# Patient Record
Sex: MALE | Race: WHITE | NOT HISPANIC OR LATINO | Employment: FULL TIME | URBAN - METROPOLITAN AREA
[De-identification: names, ages, dates, MRNs, and addresses within clinical notes are randomized per-mention and may not be internally consistent; named-entity substitution may affect disease eponyms.]

---

## 2017-01-12 ENCOUNTER — LAB VISIT (OUTPATIENT)
Dept: LAB | Facility: HOSPITAL | Age: 62
End: 2017-01-12
Attending: NURSE PRACTITIONER
Payer: COMMERCIAL

## 2017-01-12 DIAGNOSIS — I10 ESSENTIAL HYPERTENSION: ICD-10-CM

## 2017-01-12 DIAGNOSIS — E11.9 TYPE 2 DIABETES MELLITUS WITHOUT COMPLICATION: ICD-10-CM

## 2017-01-12 DIAGNOSIS — Z13.29 THYROID DISORDER SCREEN: ICD-10-CM

## 2017-01-12 DIAGNOSIS — Z13.0 SCREENING, ANEMIA, DEFICIENCY, IRON: ICD-10-CM

## 2017-01-12 DIAGNOSIS — E78.9 LIPID DISORDER: ICD-10-CM

## 2017-01-12 DIAGNOSIS — Z12.5 PROSTATE CANCER SCREENING: ICD-10-CM

## 2017-01-12 LAB
ALBUMIN SERPL BCP-MCNC: 4.6 G/DL
ALP SERPL-CCNC: 64 IU/L
ALT SERPL W/O P-5'-P-CCNC: 42 IU/L
ANION GAP SERPL CALC-SCNC: 13 MMOL/L
AST SERPL-CCNC: 31 IU/L
BASOPHILS # BLD AUTO: 0.02 K/UL
BASOPHILS NFR BLD: 0.3 %
BILIRUB SERPL-MCNC: 0.8 MG/DL
BUN SERPL-MCNC: 12 MG/DL
CALCIUM SERPL-MCNC: 9.8 MG/DL
CHLORIDE SERPL-SCNC: 100 MMOL/L
CHOLEST/HDLC SERPL: 4.6 {RATIO}
CO2 SERPL-SCNC: 30 MMOL/L
COMPLEXED PSA SERPL-MCNC: 0.52 NG/ML
CREAT SERPL-MCNC: 0.71 MG/DL
DIFFERENTIAL METHOD: NORMAL
EOSINOPHIL # BLD AUTO: 0.2 K/UL
EOSINOPHIL NFR BLD: 3.3 %
ERYTHROCYTE [DISTWIDTH] IN BLOOD BY AUTOMATED COUNT: 12.3 %
EST. GFR  (AFRICAN AMERICAN): >60 ML/MIN/1.73 M^2
EST. GFR  (NON AFRICAN AMERICAN): >60 ML/MIN/1.73 M^2
GLUCOSE SERPL-MCNC: 146 MG/DL
HCT VFR BLD AUTO: 42.4 %
HDL/CHOLESTEROL RATIO: 21.9 %
HDLC SERPL-MCNC: 210 MG/DL
HDLC SERPL-MCNC: 46 MG/DL
HGB BLD-MCNC: 14.2 G/DL
LDLC SERPL CALC-MCNC: 97.2 MG/DL
LYMPHOCYTES # BLD AUTO: 1.9 K/UL
LYMPHOCYTES NFR BLD: 29.5 %
MCH RBC QN AUTO: 30.7 PG
MCHC RBC AUTO-ENTMCNC: 33.5 %
MCV RBC AUTO: 92 FL
MONOCYTES # BLD AUTO: 0.6 K/UL
MONOCYTES NFR BLD: 9.3 %
NEUTROPHILS # BLD AUTO: 3.7 K/UL
NEUTROPHILS NFR BLD: 57.4 %
NONHDLC SERPL-MCNC: 164 MG/DL
PLATELET # BLD AUTO: 321 K/UL
PMV BLD AUTO: 11.3 FL
POTASSIUM SERPL-SCNC: 4.6 MMOL/L
PROT SERPL-MCNC: 7.5 G/DL
RBC # BLD AUTO: 4.63 M/UL
SODIUM SERPL-SCNC: 143 MMOL/L
TRIGL SERPL-MCNC: 334 MG/DL
TSH SERPL DL<=0.005 MIU/L-ACNC: 3.21 UIU/ML
WBC # BLD AUTO: 6.43 K/UL

## 2017-01-12 PROCEDURE — 84443 ASSAY THYROID STIM HORMONE: CPT

## 2017-01-12 PROCEDURE — 36415 COLL VENOUS BLD VENIPUNCTURE: CPT | Mod: PO

## 2017-01-12 PROCEDURE — 83036 HEMOGLOBIN GLYCOSYLATED A1C: CPT | Mod: PO

## 2017-01-12 PROCEDURE — 85025 COMPLETE CBC W/AUTO DIFF WBC: CPT | Mod: PO

## 2017-01-12 PROCEDURE — 80053 COMPREHEN METABOLIC PANEL: CPT | Mod: PO

## 2017-01-12 PROCEDURE — 84153 ASSAY OF PSA TOTAL: CPT

## 2017-01-12 PROCEDURE — 80061 LIPID PANEL: CPT

## 2017-01-13 ENCOUNTER — OFFICE VISIT (OUTPATIENT)
Dept: FAMILY MEDICINE | Facility: CLINIC | Age: 62
End: 2017-01-13
Payer: COMMERCIAL

## 2017-01-13 ENCOUNTER — TELEPHONE (OUTPATIENT)
Dept: FAMILY MEDICINE | Facility: CLINIC | Age: 62
End: 2017-01-13

## 2017-01-13 VITALS
BODY MASS INDEX: 30.51 KG/M2 | SYSTOLIC BLOOD PRESSURE: 120 MMHG | WEIGHT: 189.81 LBS | HEIGHT: 66 IN | DIASTOLIC BLOOD PRESSURE: 80 MMHG | OXYGEN SATURATION: 98 % | HEART RATE: 87 BPM | TEMPERATURE: 98 F

## 2017-01-13 DIAGNOSIS — E11.9 TYPE 2 DIABETES MELLITUS WITHOUT COMPLICATION, WITHOUT LONG-TERM CURRENT USE OF INSULIN: ICD-10-CM

## 2017-01-13 DIAGNOSIS — F41.9 ANXIETY: ICD-10-CM

## 2017-01-13 DIAGNOSIS — E11.69 HYPERLIPIDEMIA ASSOCIATED WITH TYPE 2 DIABETES MELLITUS: ICD-10-CM

## 2017-01-13 DIAGNOSIS — I10 ESSENTIAL HYPERTENSION: ICD-10-CM

## 2017-01-13 DIAGNOSIS — M25.511 RIGHT SHOULDER PAIN, UNSPECIFIED CHRONICITY: ICD-10-CM

## 2017-01-13 DIAGNOSIS — E78.5 HYPERLIPIDEMIA ASSOCIATED WITH TYPE 2 DIABETES MELLITUS: ICD-10-CM

## 2017-01-13 DIAGNOSIS — Z00.00 ANNUAL PHYSICAL EXAM: Primary | ICD-10-CM

## 2017-01-13 LAB
ESTIMATED AVG GLUCOSE: 137 MG/DL
HBA1C MFR BLD HPLC: 6.4 %

## 2017-01-13 PROCEDURE — 99396 PREV VISIT EST AGE 40-64: CPT | Mod: S$GLB,,, | Performed by: NURSE PRACTITIONER

## 2017-01-13 PROCEDURE — 3074F SYST BP LT 130 MM HG: CPT | Mod: S$GLB,,, | Performed by: NURSE PRACTITIONER

## 2017-01-13 PROCEDURE — 3079F DIAST BP 80-89 MM HG: CPT | Mod: S$GLB,,, | Performed by: NURSE PRACTITIONER

## 2017-01-13 PROCEDURE — 99999 PR PBB SHADOW E&M-EST. PATIENT-LVL III: CPT | Mod: PBBFAC,,, | Performed by: NURSE PRACTITIONER

## 2017-01-13 RX ORDER — FENOFIBRATE 160 MG/1
160 TABLET ORAL DAILY
Qty: 90 TABLET | Refills: 1 | Status: SHIPPED | OUTPATIENT
Start: 2017-01-13 | End: 2017-01-13 | Stop reason: SDUPTHER

## 2017-01-13 RX ORDER — LISINOPRIL 40 MG/1
40 TABLET ORAL DAILY
Qty: 90 TABLET | Refills: 1 | Status: SHIPPED | OUTPATIENT
Start: 2017-01-13 | End: 2017-09-06 | Stop reason: SDUPTHER

## 2017-01-13 RX ORDER — TRAMADOL HYDROCHLORIDE 50 MG/1
50 TABLET ORAL 2 TIMES DAILY PRN
Qty: 45 TABLET | Refills: 0 | Status: SHIPPED | OUTPATIENT
Start: 2017-01-13 | End: 2017-06-16 | Stop reason: ALTCHOICE

## 2017-01-13 RX ORDER — MELOXICAM 7.5 MG/1
7.5 TABLET ORAL DAILY
Qty: 30 TABLET | Refills: 5 | Status: SHIPPED | OUTPATIENT
Start: 2017-01-13 | End: 2017-06-16 | Stop reason: SDUPTHER

## 2017-01-13 RX ORDER — ROSUVASTATIN CALCIUM 20 MG/1
20 TABLET, COATED ORAL DAILY
Qty: 90 TABLET | Refills: 1 | Status: SHIPPED | OUTPATIENT
Start: 2017-01-13 | End: 2017-01-13 | Stop reason: SDUPTHER

## 2017-01-13 RX ORDER — ROSUVASTATIN CALCIUM 20 MG/1
20 TABLET, COATED ORAL DAILY
Qty: 90 TABLET | Refills: 1 | Status: SHIPPED | OUTPATIENT
Start: 2017-01-13 | End: 2017-06-16 | Stop reason: SDUPTHER

## 2017-01-13 RX ORDER — CLONAZEPAM 1 MG/1
1 TABLET ORAL NIGHTLY PRN
Qty: 30 TABLET | Refills: 0 | Status: SHIPPED | OUTPATIENT
Start: 2017-01-13 | End: 2017-06-16 | Stop reason: SDUPTHER

## 2017-01-13 RX ORDER — FENOFIBRATE 160 MG/1
160 TABLET ORAL DAILY
Qty: 90 TABLET | Refills: 1 | Status: SHIPPED | OUTPATIENT
Start: 2017-01-13 | End: 2017-06-16 | Stop reason: SDUPTHER

## 2017-01-13 RX ORDER — METFORMIN HYDROCHLORIDE 1000 MG/1
1000 TABLET ORAL 2 TIMES DAILY WITH MEALS
Qty: 180 TABLET | Refills: 1 | Status: SHIPPED | OUTPATIENT
Start: 2017-01-13 | End: 2017-12-27 | Stop reason: SDUPTHER

## 2017-01-13 RX ORDER — ASPIRIN 81 MG/1
81 TABLET ORAL DAILY
Qty: 90 TABLET | Refills: 3 | Status: SHIPPED | OUTPATIENT
Start: 2017-01-13 | End: 2030-01-12

## 2017-01-13 NOTE — PROGRESS NOTES
"Subjective:       Patient ID: Bartolome Esquivel Jr. is a 62 y.o. male.    Chief Complaint: Annual Exam (wellness)    HPI Comments: Patient is a 62 year old white male here today for annual physical exam with wellness lab results.    Patient has Type 2 DM that is controlled on present medication.   with HgbA1C of 6.4.  Kidney function is stable.  Eye exam is up to date.  Declined Pneumonia vaccine.      Patient has Hypertension that is controlled on present medication.  /80 (BP Location: Right arm, Patient Position: Sitting, BP Method: Manual)  Pulse 87  Temp 97.8 °F (36.6 °C) (Oral)   Ht 5' 6" (1.676 m)  Wt 86.1 kg (189 lb 13.1 oz)  SpO2 98%  BMI 30.64 kg/m2    Patient has Hyperlipidemia.  Total cholesterol is mildly elevated at 210 and triglycerides are high 333.  LDL remains < 100.  Patient reports he did not run out of medication BUT they did change him from Brand Crestor to GENERIC since it is now available - the increase could be secondary to generic change - will monitor and see if it continues to elevate because it was just changed in the past month.    Patient has Anxiety - takes the Clonazepam only as needed.    Patient complains of a Chronic Right Shoulder pain - his right shoulder hurts on and off for years.  He reports occurences since before Dr. Michaels retired - 7 or 8 years it hurts but usually occurs seasonally and had attributed to fishing or other use.  Reports he was given steroids in the past and helped a little.  Reports was given Vicodin in the past and did not help and had stronger medication but did not like the way it made him fill.  He reports it is still intermittently once or twice a week.  States he would take Ibuprofen or Aleve but when that wasn't helping any more.  States his wife had Tramadol and he took one of hers and reports complete relief of pain.  Patient reports he has never seen an orthopedic MD to have shoulder evaluated.            Component      Latest Ref Rng & " Units 1/12/2017 6/22/2016   WBC      3.90 - 12.70 K/uL 6.43    RBC      4.60 - 6.20 M/uL 4.63    Hemoglobin      14.0 - 18.0 g/dL 14.2    Hematocrit      40.0 - 54.0 % 42.4    MCV      82 - 98 fL 92    MCH      27.0 - 31.0 pg 30.7    MCHC      32.0 - 36.0 % 33.5    RDW      11.5 - 14.5 % 12.3    Platelets      150 - 350 K/uL 321    MPV      9.2 - 12.9 fL 11.3    Gran #      1.8 - 7.7 K/uL 3.7    Lymph #      1.0 - 4.8 K/uL 1.9    Mono #      0.3 - 1.0 K/uL 0.6    Eos #      0.0 - 0.5 K/uL 0.2    Baso #      0.00 - 0.20 K/uL 0.02    Gran%      38.0 - 73.0 % 57.4    Lymph%      18.0 - 48.0 % 29.5    Mono%      4.0 - 15.0 % 9.3    Eosinophil%      0.0 - 8.0 % 3.3    Basophil%      0.0 - 1.9 % 0.3    Differential Method       Automated    Sodium      136 - 145 mmol/L 143 134 (L)   Potassium      3.5 - 5.1 mmol/L 4.6 4.5   Chloride      95 - 110 mmol/L 100 102   CO2      23 - 29 mmol/L 30 (H) 21 (L)   Glucose      70 - 110 mg/dL 146 (H) 144 (H)   BUN, Bld      2 - 20 mg/dL 12 13   Creatinine      0.50 - 1.40 mg/dL 0.71 0.9   Calcium      8.7 - 10.5 mg/dL 9.8 9.7   Total Protein      6.0 - 8.4 g/dL 7.5 7.4   Albumin      3.5 - 5.2 g/dL 4.6 4.5   Total Bilirubin      0.1 - 1.0 mg/dL 0.8 0.6   Alkaline Phosphatase      38 - 126 IU/L 64 54 (L)   AST      15 - 46 IU/L 31 22   ALT      10 - 44 IU/L 42 31   Anion Gap      8 - 16 mmol/L 13 11   eGFR if African American      >60 mL/min/1.73 m:2 >60.0 >60.0   eGFR if non African American      >60 mL/min/1.73 m:2 >60.0 >60.0   Cholesterol      120 - 199 mg/dL 210 (H) 162   Triglycerides      30 - 150 mg/dL 334 (H) 244 (H)   HDL      40 - 75 mg/dL 46 45   LDL Cholesterol      63.0 - 159.0 mg/dL 97.2 68.2   HDL/Chol Ratio      20.0 - 50.0 % 21.9 27.8   Total Cholesterol/HDL Ratio      2.0 - 5.0 4.6 3.6   Non-HDL Cholesterol      mg/dL 164 117   Hemoglobin A1C      4.5 - 6.2 % 6.4 (H) 6.5 (H)   Estimated Avg Glucose      68 - 131 mg/dL 137 (H) 140 (H)   TSH      0.400 - 4.000 uIU/mL  3.206    PSA, SCREEN      0.00 - 4.00 ng/mL 0.52        Previous Medications    CLONAZEPAM (KLONOPIN) 1 MG TABLET    Take 1 tablet (1 mg total) by mouth nightly as needed.    FENOFIBRATE 160 MG TAB    Take 1 tablet (160 mg total) by mouth once daily.    LISINOPRIL (PRINIVIL,ZESTRIL) 40 MG TABLET    Take 1 tablet (40 mg total) by mouth once daily.    METFORMIN (GLUCOPHAGE) 1000 MG TABLET    Take 1 tablet (1,000 mg total) by mouth 2 (two) times daily with meals.    ROSUVASTATIN (CRESTOR) 20 MG TABLET    Take 1 tablet (20 mg total) by mouth once daily.       Past Medical History   Diagnosis Date    Anxiety     DM (diabetes mellitus) 11/4/2014    Hyperlipidemia     Hypertension     Impaired fasting glucose     Metabolic syndrome        Past Surgical History   Procedure Laterality Date    Colonoscopy  2007     + polyp - tubular adenoma - Dr. Hernandez       Family History   Problem Relation Age of Onset    Cancer Mother      Colon    Heart disease Father 49     MI    Diabetes Father     No Known Problems Daughter     No Known Problems Son     No Known Problems Son        Social History     Social History    Marital status:      Spouse name: N/A    Number of children: N/A    Years of education: N/A     Occupational History     [employer]     Social History Main Topics    Smoking status: Former Smoker     Packs/day: 1.00     Years: 15.00     Types: Cigarettes    Smokeless tobacco: None      Comment: Quit in 1994    Alcohol use Yes      Comment: Approx 20 beers/week    Drug use: No    Sexual activity: Not Asked     Other Topics Concern    None     Social History Narrative       Review of Systems   Constitutional: Negative for activity change, appetite change, fatigue, fever and unexpected weight change.   HENT: Negative for congestion, ear pain, mouth sores, nosebleeds, postnasal drip, rhinorrhea, sinus pressure, sneezing, sore throat, trouble swallowing and voice change.    Eyes: Negative.   "  Respiratory: Negative for cough, chest tightness and shortness of breath.    Cardiovascular: Negative for chest pain, palpitations and leg swelling.   Gastrointestinal: Negative.  Negative for abdominal pain, blood in stool, constipation, diarrhea, nausea and vomiting.   Endocrine: Negative.    Genitourinary: Negative for difficulty urinating, dysuria, flank pain, hematuria and urgency.   Musculoskeletal: Positive for arthralgias. Negative for back pain, gait problem, joint swelling, myalgias and neck pain.        Chronic right shoulder pain   Skin: Negative for color change, rash and wound.   Allergic/Immunologic: Negative for immunocompromised state.   Neurological: Negative for dizziness, tremors, seizures, syncope, speech difficulty and headaches.   Hematological: Negative for adenopathy. Does not bruise/bleed easily.   Psychiatric/Behavioral: Negative for behavioral problems, dysphoric mood, sleep disturbance and suicidal ideas. The patient is not nervous/anxious.          Objective:     Vitals:    01/13/17 0816   BP: 120/80   BP Location: Right arm   Patient Position: Sitting   BP Method: Manual   Pulse: 87   Temp: 97.8 °F (36.6 °C)   TempSrc: Oral   SpO2: 98%   Weight: 86.1 kg (189 lb 13.1 oz)   Height: 5' 6" (1.676 m)          Physical Exam   Constitutional: He is oriented to person, place, and time. He appears well-developed and well-nourished. No distress.   + obesity. Body mass index is 31.31 kg/(m^2).     HENT:   Head: Normocephalic and atraumatic.   Right Ear: External ear normal.   Left Ear: External ear normal.   Nose: Nose normal.   Mouth/Throat: Oropharynx is clear and moist. No oropharyngeal exudate.   Eyes: Conjunctivae and EOM are normal. Pupils are equal, round, and reactive to light. Right eye exhibits no discharge. Left eye exhibits no discharge. No scleral icterus.   Neck: Normal range of motion. Neck supple. No JVD present. No tracheal deviation present. No thyromegaly present. "   Cardiovascular: Normal rate.    No murmur heard.  Pulses:       Dorsalis pedis pulses are 2+ on the right side, and 2+ on the left side.        Posterior tibial pulses are 2+ on the right side, and 2+ on the left side.   + irregular rate -  premature contractions - monitored by cardiology Dr. Baker   Pulmonary/Chest: Effort normal and breath sounds normal. No stridor. No respiratory distress. He has no wheezes. He has no rales.   Abdominal: Soft. Bowel sounds are normal. He exhibits no distension. There is no tenderness. There is no rebound and no guarding. A hernia is present.       Patient with ? Ventral hernia versus diastasis recti; nontender, only present with abdominal tension   Musculoskeletal: Normal range of motion. He exhibits no edema.        Right foot: There is normal range of motion and no deformity.        Left foot: There is normal range of motion and no deformity.   Feet:   Right Foot:   Protective Sensation: 8 sites tested. 8 sites sensed.   Skin Integrity: Negative for ulcer or skin breakdown.   Left Foot:   Protective Sensation: 8 sites tested. 8 sites sensed.   Skin Integrity: Negative for ulcer or skin breakdown.   Lymphadenopathy:     He has no cervical adenopathy.   Neurological: He is alert and oriented to person, place, and time. Coordination normal.   Skin: Skin is warm and dry. No rash noted. He is not diaphoretic.   Psychiatric: He has a normal mood and affect. His behavior is normal.         Assessment:         ICD-10-CM ICD-9-CM   1. Annual physical exam Z00.00 V70.0   2. Type 2 diabetes mellitus without complication, without long-term current use of insulin E11.9 250.00   3. Essential hypertension I10 401.9   4. Hyperlipidemia associated with type 2 diabetes mellitus E11.69 250.80    E78.5 272.4   5. Anxiety F41.9 300.00   6. Right shoulder pain, unspecified chronicity M25.511 719.41       Plan:         Annual physical exam  -  Advised on Shingles and Pneumovax - declined at this  time.  Health Maintenance Summary        Zoster Vaccine Overdue 1/8/2017   Refused       Pneumococcal PPSV23 (Medium Risk) Postponed 6/14/2017 Originally 1/8/1975. Patient Refused       Colonoscopy Next Due 6/11/2017         Done 6/11/2007 Dr. Hernandez - tubular adenoma       Foot Exam Next Due 6/23/2017         Done 6/23/2016 [facility]: WORKFLOW - DIABETES - DIABETIC FOOT EXAM PERFORMED        Done 6/23/2016         Done 11/6/2015         Done 11/6/2015 [facility]: WORKFLOW - DIABETES - DIABETIC FOOT EXAM PERFORMED       Hemoglobin A1c Next Due 7/12/2017         Done 1/12/2017 HEMOGLOBIN A1C (A)        Done 6/22/2016 HEMOGLOBIN A1C (A)        Done 11/4/2015 HEMOGLOBIN A1C (A)        Done 7/20/2015 HEMOGLOBIN A1C (A)        Done 2/24/2015 HEMOGLOBIN A1C (A)       Eye Exam Next Due 12/20/2017         Done 12/20/2016 Dr. Humphries - no diabetic retinopathy       Lipid Panel Next Due 1/12/2018         Done 1/12/2017 LIPID PANEL (A)        Done 6/22/2016 LIPID PANEL (A)        Done 11/4/2015 LIPID PANEL (A)        Done 7/20/2015 LIPID PANEL (A)        Done 2/24/2015 LIPID PANEL (A)       TETANUS VACCINE Next Due 6/23/2026         Declined 6/23/2016        Hepatitis C Screening Completed         Done 6/22/2016 HEPATITIS C ANTIBODY       Influenza Vaccine Addressed         Declined 1/13/2017         Declined 11/6/2015         Type 2 diabetes mellitus without complication, without long-term current use of insulin  -  Controlled on present medication.  Repeat fasting labs and office visit in 6 months.  -  Start ASA 81 mg daily.  -     metformin (GLUCOPHAGE) 1000 MG tablet; Take 1 tablet (1,000 mg total) by mouth 2 (two) times daily with meals.  Dispense: 180 tablet; Refill: 1  -     aspirin (ECOTRIN) 81 MG EC tablet; Take 1 tablet (81 mg total) by mouth once daily.  Dispense: 90 tablet; Refill: 3    Essential hypertension  -  Continue Lisinopril at present dose.  -     lisinopril (PRINIVIL,ZESTRIL) 40 MG tablet; Take 1 tablet  (40 mg total) by mouth once daily.  Dispense: 90 tablet; Refill: 1    Hyperlipidemia associated with type 2 diabetes mellitus  -  Continue present medications below.  Stricter lifestyle modifications to lower triglycerides.  Repeat labs in 6 months.  --     rosuvastatin (CRESTOR) 20 MG tablet; Take 1 tablet (20 mg total) by mouth once daily.  Dispense: 90 tablet; Refill: 1  -     fenofibrate 160 MG Tab; Take 1 tablet (160 mg total) by mouth once daily.  Dispense: 90 tablet; Refill: 1    Anxiety  - Take Clonazepam only as needed.  -     clonazePAM (KLONOPIN) 1 MG tablet; Take 1 tablet (1 mg total) by mouth nightly as needed.  Dispense: 30 tablet; Refill: 0    Right shoulder pain, unspecified chronicity  -  Start Meloxicam 7.5 mg daily - going on intermittently with use for past 8 years - likely DJD/arthritis BUT needs to go have evaluated by Orthopedic MD.  Advised I can not treat chronic pain - I will prescribe Tramadol for 1 prescription only to take for pain that is not relieved by Meloxicam and must see specialist for further management.  Patient verbalizes understanding.  -     meloxicam (MOBIC) 7.5 MG tablet; Take 1 tablet (7.5 mg total) by mouth once daily.  Dispense: 30 tablet; Refill: 5  -     tramadol (ULTRAM) 50 mg tablet; Take 1 tablet (50 mg total) by mouth 2 (two) times daily as needed for Pain.  Dispense: 45 tablet; Refill: 0      Return in about 6 months (around 7/13/2017).     Patient's Medications   New Prescriptions    ASPIRIN (ECOTRIN) 81 MG EC TABLET    Take 1 tablet (81 mg total) by mouth once daily.    MELOXICAM (MOBIC) 7.5 MG TABLET    Take 1 tablet (7.5 mg total) by mouth once daily.    TRAMADOL (ULTRAM) 50 MG TABLET    Take 1 tablet (50 mg total) by mouth 2 (two) times daily as needed for Pain.   Previous Medications    No medications on file   Modified Medications    Modified Medication Previous Medication    CLONAZEPAM (KLONOPIN) 1 MG TABLET clonazePAM (KLONOPIN) 1 MG tablet       Take 1  tablet (1 mg total) by mouth nightly as needed.    Take 1 tablet (1 mg total) by mouth nightly as needed.    FENOFIBRATE 160 MG TAB fenofibrate 160 MG Tab       Take 1 tablet (160 mg total) by mouth once daily.    Take 1 tablet (160 mg total) by mouth once daily.    LISINOPRIL (PRINIVIL,ZESTRIL) 40 MG TABLET lisinopril (PRINIVIL,ZESTRIL) 40 MG tablet       Take 1 tablet (40 mg total) by mouth once daily.    Take 1 tablet (40 mg total) by mouth once daily.    METFORMIN (GLUCOPHAGE) 1000 MG TABLET metformin (GLUCOPHAGE) 1000 MG tablet       Take 1 tablet (1,000 mg total) by mouth 2 (two) times daily with meals.    Take 1 tablet (1,000 mg total) by mouth 2 (two) times daily with meals.    ROSUVASTATIN (CRESTOR) 20 MG TABLET rosuvastatin (CRESTOR) 20 MG tablet       Take 1 tablet (20 mg total) by mouth once daily.    Take 1 tablet (20 mg total) by mouth once daily.   Discontinued Medications    No medications on file

## 2017-01-13 NOTE — TELEPHONE ENCOUNTER
----- Message from Annetta Eaton sent at 1/13/2017 10:15 AM CST -----  Contact: [phone]  Patient is returning your call

## 2017-01-13 NOTE — TELEPHONE ENCOUNTER
Called pt left detail message.that DM are now advise to take a low dose of Asprin daily, so NP Susie sent in prescription to [facility].

## 2017-01-13 NOTE — MR AVS SNAPSHOT
[address] [address]  Phone: [phone]  Fax: [phone]                  Bartolome Esquivel Jr.   2017 8:20 AM   Office Visit    Description:  Male : 1955   Provider:  Susie Carranza NP   Department:  [facility]           Reason for Visit     Annual Exam           Diagnoses this Visit        Comments    Type 2 diabetes mellitus without complication, without long-term current use of insulin    -  Primary     Essential hypertension         Hyperlipidemia associated with type 2 diabetes mellitus         Anxiety         Right shoulder pain, unspecified chronicity         Lipid disorder                To Do List           Goals (5 Years of Data)     None      Follow-Up and Disposition     Return in about 6 months (around 2017).       These Medications        Disp Refills Start End    meloxicam (MOBIC) 7.5 MG tablet 30 tablet 5 2017     Take 1 tablet (7.5 mg total) by mouth once daily. - Oral    Ph[facility]: [facility] [address] Ph #: [phone]       tramadol (ULTRAM) 50 mg tablet 45 tablet 0 2017     Take 1 tablet (50 mg total) by mouth 2 (two) times daily as needed for Pain. - Oral    [facility] [address] Ph #: [phone]       clonazePAM (KLONOPIN) 1 MG tablet 30 tablet 0 2017     Take 1 tablet (1 mg total) by mouth nightly as needed. - Oral    [facility] [place] [facility] [address] [facility], [address] Ph #: [phone]       lisinopril (PRINIVIL,ZESTRIL) 40 MG tablet 90 tablet 1 2017     Take 1 tablet (40 mg total) by mouth once daily. - Oral    [facility] [place] [facility] [address] [facility], [address] Ph #: [phone]       metformin (GLUCOPHAGE) 1000 MG tablet 180 tablet 1 2017     Take 1 tablet (1,000 mg total) by mouth 2 (two) times daily with meals. - Oral    [id number] [place] [place] [facility] [address] [facility] [address] Ph #: [phone]       rosuvastatin (CRESTOR) 20 MG tablet 90 tablet 1 1/13/2017     Take 1 tablet (20 mg total) by mouth once daily. - Oral    Pharmacy: [facility] - [address] Ph #: [phone]       fenofibrate 160 MG Tab 90 tablet 1 1/13/2017 1/13/2018    Take 1 tablet (160 mg total) by mouth once daily. - Oral    Pharmacy: [facility] - [address] Ph #: [phone]         [facility]     [facility] On [facility] Nurse Care Line - 24/7 Assistance  Registered nurses in the [facility] On Call Center provide clinical advisement, health education, appointment booking, and other advisory services.  Call for this free service at [phone].             Medications           Message regarding Medications     Verify the changes and/or additions to your medication regime listed below are the same as discussed with your clinician today.  If any of these changes or additions are incorrect, please notify your healthcare provider.        START taking these NEW medications        Refills    meloxicam (MOBIC) 7.5 MG tablet 5    Sig: Take 1 tablet (7.5 mg total) by mouth once daily.    Class: Normal    Route: Oral    tramadol (ULTRAM) 50 mg tablet 0    Sig: Take 1 tablet (50 mg total) by mouth 2 (two) times daily as needed for Pain.    Class: Print    Route: Oral           Verify that the below list of medications is an accurate representation of the medications you are currently taking.  If none reported, the list may be blank. If incorrect, please contact your healthcare provider. Carry this list with you in case of emergency.           Current Medications     clonazePAM (KLONOPIN) 1 MG tablet Take 1 tablet (1 mg total) by mouth nightly as needed.    fenofibrate 160 MG Tab Take 1 tablet (160 mg total) by mouth once daily.    lisinopril (PRINIVIL,ZESTRIL) 40 MG tablet Take 1  "tablet (40 mg total) by mouth once daily.    metformin (GLUCOPHAGE) 1000 MG tablet Take 1 tablet (1,000 mg total) by mouth 2 (two) times daily with meals.    rosuvastatin (CRESTOR) 20 MG tablet Take 1 tablet (20 mg total) by mouth once daily.    meloxicam (MOBIC) 7.5 MG tablet Take 1 tablet (7.5 mg total) by mouth once daily.    tramadol (ULTRAM) 50 mg tablet Take 1 tablet (50 mg total) by mouth 2 (two) times daily as needed for Pain.           Clinical Reference Information           Vital Signs - Last Recorded  Most recent update: 1/13/2017  8:20 AM by Terrie Miranda MA    BP Pulse Temp Ht Wt SpO2    120/80 (BP Location: Right arm, Patient Position: Sitting, BP Method: Manual) 87 97.8 °F (36.6 °C) (Oral) 5' 6" (1.676 m) 86.1 kg (189 lb 13.1 oz) 98%    BMI                30.64 kg/m2          Blood Pressure          Most Recent Value    BP  120/80      Allergies as of 1/13/2017     Lipitor [Atorvastatin]    Pcn [Penicillins]      Immunizations Administered on Date of Encounter - 1/13/2017     None      [facility] Sign-Up     Activating your [facility] account is as easy as 1-2-3!     1) Visit [email or web address], select Sign Up Now, enter this activation code and your date of birth, then select Next.  [email or web address]  Expires: 2/27/2017  8:49 AM      2) Create a username and password to use when you visit [facility] in the future and select a security question in case you lose your password and select Next.    3) Enter your e-mail address and click Sign Up!    Additional Information  If you have questions, please e-mail [email or web address] or call [phone] to talk to our [facility] staff. Remember, [facility] is NOT to be used for urgent needs. For medical emergencies, dial 911.         "

## 2017-03-17 ENCOUNTER — TELEPHONE (OUTPATIENT)
Dept: UROLOGY | Facility: CLINIC | Age: 62
End: 2017-03-17

## 2017-03-17 NOTE — TELEPHONE ENCOUNTER
----- Message from Roque Seth sent at 3/17/2017 11:03 AM CDT -----  Contact: [phone]/self  Refill request for tramadol (ULTRAM) 50 mg tablet.  Please advise

## 2017-03-17 NOTE — TELEPHONE ENCOUNTER
When I seen patient in Jan. 2017, this is what I told him:  - Start Meloxicam 7.5 mg daily - going on intermittently with use for past 8 years - likely DJD/arthritis BUT needs to go have evaluated by Orthopedic MD. Advised I can not treat chronic pain - I will prescribe Tramadol for 1 prescription only to take for pain that is not relieved by Meloxicam and must see specialist for further management. Patient verbalizes understanding.    Nurse practitioners can not treat chronic pain - see orthopedic MD for chronic shoulder pain

## 2017-06-12 ENCOUNTER — TELEPHONE (OUTPATIENT)
Dept: FAMILY MEDICINE | Facility: CLINIC | Age: 62
End: 2017-06-12

## 2017-06-12 NOTE — TELEPHONE ENCOUNTER
Advise patient that NO he is not due for PSA level - I had just checked it in Jan. 2017 with wellness exam.

## 2017-06-12 NOTE — TELEPHONE ENCOUNTER
----- Message from Lucila Riddhi sent at 6/12/2017 11:38 AM CDT -----  Contact: self, [phone]  Patient requests to know if he is due for PSA testing , if so he would like to have it done on 6/15 when he has his other lab work scheduled. Please advise.

## 2017-06-15 ENCOUNTER — LAB VISIT (OUTPATIENT)
Dept: LAB | Facility: HOSPITAL | Age: 62
End: 2017-06-15
Attending: NURSE PRACTITIONER
Payer: COMMERCIAL

## 2017-06-15 DIAGNOSIS — E78.5 HYPERLIPIDEMIA ASSOCIATED WITH TYPE 2 DIABETES MELLITUS: ICD-10-CM

## 2017-06-15 DIAGNOSIS — E11.9 TYPE 2 DIABETES MELLITUS WITHOUT COMPLICATION, WITHOUT LONG-TERM CURRENT USE OF INSULIN: ICD-10-CM

## 2017-06-15 DIAGNOSIS — E11.69 HYPERLIPIDEMIA ASSOCIATED WITH TYPE 2 DIABETES MELLITUS: ICD-10-CM

## 2017-06-15 LAB
ALBUMIN SERPL BCP-MCNC: 4.9 G/DL
ALP SERPL-CCNC: 56 IU/L
ALT SERPL W/O P-5'-P-CCNC: 60 IU/L
ANION GAP SERPL CALC-SCNC: 14 MMOL/L
AST SERPL-CCNC: 39 IU/L
BILIRUB SERPL-MCNC: 0.8 MG/DL
BUN SERPL-MCNC: 14 MG/DL
CALCIUM SERPL-MCNC: 10 MG/DL
CHLORIDE SERPL-SCNC: 96 MMOL/L
CHOLEST/HDLC SERPL: 4 {RATIO}
CO2 SERPL-SCNC: 27 MMOL/L
CREAT SERPL-MCNC: 0.78 MG/DL
EST. GFR  (AFRICAN AMERICAN): >60 ML/MIN/1.73 M^2
EST. GFR  (NON AFRICAN AMERICAN): >60 ML/MIN/1.73 M^2
ESTIMATED AVG GLUCOSE: 140 MG/DL
GLUCOSE SERPL-MCNC: 136 MG/DL
HBA1C MFR BLD HPLC: 6.5 %
HDL/CHOLESTEROL RATIO: 24.7 %
HDLC SERPL-MCNC: 182 MG/DL
HDLC SERPL-MCNC: 45 MG/DL
LDLC SERPL CALC-MCNC: 75 MG/DL
NONHDLC SERPL-MCNC: 137 MG/DL
POTASSIUM SERPL-SCNC: 5.3 MMOL/L
PROT SERPL-MCNC: 8 G/DL
SODIUM SERPL-SCNC: 137 MMOL/L
TRIGL SERPL-MCNC: 310 MG/DL

## 2017-06-15 PROCEDURE — 80061 LIPID PANEL: CPT

## 2017-06-15 PROCEDURE — 80053 COMPREHEN METABOLIC PANEL: CPT | Mod: PO

## 2017-06-15 PROCEDURE — 83036 HEMOGLOBIN GLYCOSYLATED A1C: CPT | Mod: PO

## 2017-06-15 PROCEDURE — 36415 COLL VENOUS BLD VENIPUNCTURE: CPT | Mod: PO

## 2017-06-16 ENCOUNTER — OFFICE VISIT (OUTPATIENT)
Dept: FAMILY MEDICINE | Facility: CLINIC | Age: 62
End: 2017-06-16
Payer: COMMERCIAL

## 2017-06-16 VITALS
TEMPERATURE: 98 F | SYSTOLIC BLOOD PRESSURE: 114 MMHG | WEIGHT: 195.31 LBS | HEIGHT: 66 IN | BODY MASS INDEX: 31.39 KG/M2 | DIASTOLIC BLOOD PRESSURE: 80 MMHG | HEART RATE: 75 BPM | OXYGEN SATURATION: 98 %

## 2017-06-16 DIAGNOSIS — G89.29 CHRONIC RIGHT SHOULDER PAIN: ICD-10-CM

## 2017-06-16 DIAGNOSIS — E11.9 TYPE 2 DIABETES MELLITUS WITHOUT COMPLICATION, WITHOUT LONG-TERM CURRENT USE OF INSULIN: Primary | ICD-10-CM

## 2017-06-16 DIAGNOSIS — E78.5 HYPERLIPIDEMIA ASSOCIATED WITH TYPE 2 DIABETES MELLITUS: ICD-10-CM

## 2017-06-16 DIAGNOSIS — E87.5 HYPERKALEMIA: ICD-10-CM

## 2017-06-16 DIAGNOSIS — E11.69 HYPERLIPIDEMIA ASSOCIATED WITH TYPE 2 DIABETES MELLITUS: ICD-10-CM

## 2017-06-16 DIAGNOSIS — M25.511 CHRONIC RIGHT SHOULDER PAIN: ICD-10-CM

## 2017-06-16 DIAGNOSIS — F41.9 ANXIETY: ICD-10-CM

## 2017-06-16 DIAGNOSIS — I10 ESSENTIAL HYPERTENSION: ICD-10-CM

## 2017-06-16 PROCEDURE — 99999 PR PBB SHADOW E&M-EST. PATIENT-LVL IV: CPT | Mod: PBBFAC,,, | Performed by: NURSE PRACTITIONER

## 2017-06-16 PROCEDURE — 3044F HG A1C LEVEL LT 7.0%: CPT | Mod: S$GLB,,, | Performed by: NURSE PRACTITIONER

## 2017-06-16 PROCEDURE — 99214 OFFICE O/P EST MOD 30 MIN: CPT | Mod: S$GLB,,, | Performed by: NURSE PRACTITIONER

## 2017-06-16 PROCEDURE — 4010F ACE/ARB THERAPY RXD/TAKEN: CPT | Mod: S$GLB,,, | Performed by: NURSE PRACTITIONER

## 2017-06-16 RX ORDER — ROSUVASTATIN CALCIUM 20 MG/1
20 TABLET, COATED ORAL DAILY
Qty: 90 TABLET | Refills: 1 | Status: SHIPPED | OUTPATIENT
Start: 2017-06-16 | End: 2018-06-22 | Stop reason: SDUPTHER

## 2017-06-16 RX ORDER — FENOFIBRATE 160 MG/1
160 TABLET ORAL DAILY
Qty: 90 TABLET | Refills: 1 | Status: SHIPPED | OUTPATIENT
Start: 2017-06-16 | End: 2018-02-26 | Stop reason: SINTOL

## 2017-06-16 RX ORDER — MELOXICAM 7.5 MG/1
7.5 TABLET ORAL DAILY
Qty: 90 TABLET | Refills: 1 | Status: SHIPPED | OUTPATIENT
Start: 2017-06-16 | End: 2017-12-20 | Stop reason: SDUPTHER

## 2017-06-16 RX ORDER — CLONAZEPAM 1 MG/1
1 TABLET ORAL NIGHTLY PRN
Qty: 30 TABLET | Refills: 3 | Status: SHIPPED | OUTPATIENT
Start: 2017-06-16 | End: 2018-02-26 | Stop reason: SDUPTHER

## 2017-06-16 NOTE — PATIENT INSTRUCTIONS
Discharge Instructions: Eating a Low-Potassium Diet  Your health care provider has prescribed a low-potassium diet for you. This kind of diet is advised for people who have certain kidney problems. Potassium is needed for muscle function. But too much potassium is a health risk. Potassium is found in many foods. Read below to find out how to change your diet.  Foods to limit  Some foods are high in potassium. Limit your daily intake of the foods in the list below.  · Fruits: apricots (canned and fresh), bananas, cantaloupe, honeydew melon, kiwi, nectarines, pomegranates, oranges, orange juice, pears, dried fruits (apricots, dates, figs, prunes), and prune juice  · Vegetables: asparagus, avocado, artichoke, bamboo shoots, beets, brussels sprouts, cabbage, celery, chard, okra, potatoes (white and sweet), pumpkin, rutabaga, spinach (cooked), squash, tomato, tomato sauce, tomato juice, and vegetable juice cocktail  · Legumes: black-eyed peas, chickpeas, lentils, lima beans, navy beans, red kidney beans, soybeans, and split peas  · Nuts and seeds: almonds, Brazil nuts, cashews, peanuts, peanut butter, pecans, pumpkin seeds, sunflower seeds, and walnuts  · Breads and cereals: bran and whole-grain products  · Dairy foods: milk, cheese, ice cream, yogurt  · Animal protein: all forms of animal protein  · Other: chocolate, cocoa, coconut milk, and molasses  Tips  · Ask your health care provider how much potassium you are allowed each day. This will help you figure out serving sizes for your needs.  · Check labels for potassium. It may be listed as potassium chloride.  · Do not use salt substitutes. These often have potassium in them.  · Cook frozen fruits and vegetables in water. Rinse and drain them well before eating.  · Drain liquid from all canned fruits and vegetables. Rinse them before eating.  · Reduce the potassium in potatoes. Peel them, slice thinly, and soak in water for at least 4 hours.  · Reduce the potassium  in green leafy vegetables. Soak them in water for at least 4 hours.  · Eat white rice and refined white flour products. These include white bread, pasta, and grits.  Follow-up  Make a follow-up appointment as advised by our staff.  When to call your health care provider  Call your health care provider right away if you have any of the following:  · Fatigue  · Shortness of breath  · Chest pain  · Slow, irregular heartbeat  · Fainting  · Dizziness  · Lightheadedness  · Confusion   Date Last Reviewed: 6/21/2015  © 8995-3464 [employer]. [address] [place] [address]. All rights reserved. This information is not intended as a substitute for professional medical care. Always follow your healthcare professional's instructions.

## 2017-06-16 NOTE — PROGRESS NOTES
"Subjective:       Patient ID: Bartolome Esquivel Jr. is a 62 y.o. male.    Chief Complaint: Follow-up (lab results) and Medication Refill    Patient is here today for follow up with lab results.    Patient has Type 2 Diabetes that is controlled on present medications with  and A1C of 6.5.  Eye exam up to date and refuses Pneumonia vaccine.    Patient has Hypertension that is controlled on present medication.  /80 (BP Location: Left arm, Patient Position: Sitting, BP Method: Manual)   Pulse 75   Temp 97.7 °F (36.5 °C) (Oral)   Ht 5' 6" (1.676 m)   Wt 88.6 kg (195 lb 5.2 oz)   SpO2 98%   BMI 31.53 kg/m²       Patient has Hyperlipidemia with total cholesterol and LDL controlled on Crestor and Fenofibrate.  Triglycerides remain elevated at 310.      Patient has anxiety and takes Clonazepam only as needed at night.    Patient has chronic right shoulder pain that gets relief with Meloxicam as needed.    Patient has Hyperkalemia of 5.3 this lab visit - normally within normal range and has not had any change in medications for past year.  Will put on a low-potassium diet and recheck in 1 month.    ALT mildly elevated at 60 - advised on weight loss and low fat diet - will recheck in 1 month.      Component      Latest Ref Rng & Units 6/15/2017 1/12/2017 6/22/2016   Sodium      136 - 145 mmol/L 137 143 134 (L)   Potassium      3.5 - 5.1 mmol/L 5.3 (H) 4.6 4.5   Chloride      95 - 110 mmol/L 96 100 102   CO2      23 - 29 mmol/L 27 30 (H) 21 (L)   Glucose      70 - 110 mg/dL 136 (H) 146 (H) 144 (H)   BUN, Bld      2 - 20 mg/dL 14 12 13   Creatinine      0.50 - 1.40 mg/dL 0.78 0.71 0.9   Calcium      8.7 - 10.5 mg/dL 10.0 9.8 9.7   Total Protein      6.0 - 8.4 g/dL 8.0 7.5 7.4   Albumin      3.5 - 5.2 g/dL 4.9 4.6 4.5   Total Bilirubin      0.1 - 1.0 mg/dL 0.8 0.8 0.6   Alkaline Phosphatase      38 - 126 IU/L 56 64 54 (L)   AST      15 - 46 IU/L 39 31 22   ALT      10 - 44 IU/L 60 (H) 42 31   Anion Gap      8 - " 16 mmol/L 14 13 11   eGFR if African American      >60 mL/min/1.73 m:2 >60.0 >60.0 >60.0   eGFR if non African American      >60 mL/min/1.73 m:2 >60.0 >60.0 >60.0   Cholesterol      120 - 199 mg/dL 182 210 (H) 162   Triglycerides      30 - 150 mg/dL 310 (H) 334 (H) 244 (H)   HDL      40 - 75 mg/dL 45 46 45   LDL Cholesterol      63.0 - 159.0 mg/dL 75.0 97.2 68.2   HDL/Chol Ratio      20.0 - 50.0 % 24.7 21.9 27.8   Total Cholesterol/HDL Ratio      2.0 - 5.0 4.0 4.6 3.6   Non-HDL Cholesterol      mg/dL 137 164 117   Hemoglobin A1C      4.0 - 5.6 % 6.5 (H) 6.4 (H) 6.5 (H)   Estimated Avg Glucose      68 - 131 mg/dL 140 (H) 137 (H) 140 (H)     Previous Medications    ASPIRIN (ECOTRIN) 81 MG EC TABLET    Take 1 tablet (81 mg total) by mouth once daily.    CLONAZEPAM (KLONOPIN) 1 MG TABLET    Take 1 tablet (1 mg total) by mouth nightly as needed.    FENOFIBRATE 160 MG TAB    Take 1 tablet (160 mg total) by mouth once daily.    LISINOPRIL (PRINIVIL,ZESTRIL) 40 MG TABLET    Take 1 tablet (40 mg total) by mouth once daily.    MELOXICAM (MOBIC) 7.5 MG TABLET    Take 1 tablet (7.5 mg total) by mouth once daily.    METFORMIN (GLUCOPHAGE) 1000 MG TABLET    Take 1 tablet (1,000 mg total) by mouth 2 (two) times daily with meals.    ROSUVASTATIN (CRESTOR) 20 MG TABLET    Take 1 tablet (20 mg total) by mouth once daily.       Past Medical History:   Diagnosis Date    Anxiety     DM (diabetes mellitus) 11/4/2014    Hyperlipidemia     Hypertension     Impaired fasting glucose     Metabolic syndrome        Past Surgical History:   Procedure Laterality Date    COLONOSCOPY  2007    + polyp - tubular adenoma - Dr. Hernandez       Family History   Problem Relation Age of Onset    Cancer Mother      Colon    Heart disease Father 49     MI    Diabetes Father     No Known Problems Daughter     No Known Problems Son     No Known Problems Son        Social History     Social History    Marital status:      Spouse name: N/A     Number of children: N/A    Years of education: N/A     Occupational History     [employer]     Social History Main Topics    Smoking status: Former Smoker     Packs/day: 1.00     Years: 15.00     Types: Cigarettes    Smokeless tobacco: None      Comment: Quit in 1994    Alcohol use Yes      Comment: Approx 20 beers/week    Drug use: No    Sexual activity: Not Asked     Other Topics Concern    None     Social History Narrative    None       Review of Systems   Constitutional: Negative for activity change, appetite change, fatigue, fever and unexpected weight change.   HENT: Negative for congestion, ear pain, mouth sores, nosebleeds, postnasal drip, rhinorrhea, sinus pressure, sneezing, sore throat, trouble swallowing and voice change.    Eyes: Negative.    Respiratory: Negative for cough, chest tightness and shortness of breath.    Cardiovascular: Negative for chest pain, palpitations and leg swelling.   Gastrointestinal: Negative.  Negative for abdominal pain, blood in stool, constipation, diarrhea, nausea and vomiting.   Endocrine: Negative.    Genitourinary: Negative for difficulty urinating, dysuria, flank pain, hematuria and urgency.   Musculoskeletal: Negative for arthralgias, back pain, gait problem, joint swelling, myalgias and neck pain.   Skin: Negative for color change, rash and wound.   Allergic/Immunologic: Negative for immunocompromised state.   Neurological: Negative for dizziness, tremors, seizures, syncope, speech difficulty and headaches.   Hematological: Negative for adenopathy. Does not bruise/bleed easily.   Psychiatric/Behavioral: Negative for behavioral problems, dysphoric mood, sleep disturbance and suicidal ideas. The patient is not nervous/anxious.          Objective:     Vitals:    06/16/17 0829   BP: 114/80   BP Location: Left arm   Patient Position: Sitting   BP Method: Manual   Pulse: 75   Temp: 97.7 °F (36.5 °C)   TempSrc: Oral   SpO2: 98%   Weight: 88.6 kg (195 lb 5.2 oz)  "  Height: 5' 6" (1.676 m)          Physical Exam   Constitutional: He is oriented to person, place, and time. He appears well-developed and well-nourished. No distress.   + obesity. Body mass index is 31.31 kg/(m^2).     HENT:   Head: Normocephalic and atraumatic.   Right Ear: External ear normal.   Left Ear: External ear normal.   Nose: Nose normal.   Mouth/Throat: Oropharynx is clear and moist. No oropharyngeal exudate.   Eyes: Conjunctivae and EOM are normal. Pupils are equal, round, and reactive to light. Right eye exhibits no discharge. Left eye exhibits no discharge. No scleral icterus.   Neck: Normal range of motion. Neck supple. No JVD present. No tracheal deviation present. No thyromegaly present.   Cardiovascular: Normal rate.    No murmur heard.  Pulmonary/Chest: Effort normal and breath sounds normal. No stridor. No respiratory distress. He has no wheezes. He has no rales.   Abdominal: Soft. Bowel sounds are normal. He exhibits no distension. There is no tenderness. There is no rebound and no guarding.   Musculoskeletal: He exhibits no edema.   Lymphadenopathy:     He has no cervical adenopathy.   Neurological: He is alert and oriented to person, place, and time. Coordination normal.   Skin: Skin is warm and dry. No rash noted. He is not diaphoretic.   Psychiatric: He has a normal mood and affect. His behavior is normal.         Assessment:         ICD-10-CM ICD-9-CM   1. Type 2 diabetes mellitus without complication, without long-term current use of insulin E11.9 250.00   2. Essential hypertension I10 401.9   3. Hyperlipidemia associated with type 2 diabetes mellitus E11.69 250.80    E78.5 272.4   4. Anxiety F41.9 300.00   5. Chronic right shoulder pain M25.511 719.41    G89.29 338.29   6. Right shoulder pain, unspecified chronicity M25.511 719.41   7. Hyperkalemia E87.5 276.7       Plan:       Type 2 diabetes mellitus without complication, without long-term current use of insulin  -  Continue " medications at present dose and recheck in 6 months.    Essential hypertension  -  Controlled on lisinopril at present dose - recheck in 6 months.    Hyperlipidemia associated with type 2 diabetes mellitus  -  Lifestyle modifications to lower triglycerides, take fish oil supplement and continue medications.  -     fenofibrate 160 MG Tab; Take 1 tablet (160 mg total) by mouth once daily.  Dispense: 90 tablet; Refill: 1  -     rosuvastatin (CRESTOR) 20 MG tablet; Take 1 tablet (20 mg total) by mouth once daily.  Dispense: 90 tablet; Refill: 1    Anxiety  -  Take Clonazepam only as needed  -     clonazePAM (KLONOPIN) 1 MG tablet; Take 1 tablet (1 mg total) by mouth nightly as needed.  Dispense: 30 tablet; Refill: 3    Chronic right shoulder pain  -  Take meloxicam only as needed  -     meloxicam (MOBIC) 7.5 MG tablet; Take 1 tablet (7.5 mg total) by mouth once daily.  Dispense: 90 tablet; Refill: 1    Hyperkalemia  -  Follow low potassium diet and repeat cmp in 4 weeks.  -     Comprehensive metabolic panel; Future; Expected date: 07/16/2017      Return in about 4 weeks (around 7/14/2017) for repeat CMP only - no visit needed.     Patient's Medications   New Prescriptions    No medications on file   Previous Medications    ASPIRIN (ECOTRIN) 81 MG EC TABLET    Take 1 tablet (81 mg total) by mouth once daily.    LISINOPRIL (PRINIVIL,ZESTRIL) 40 MG TABLET    Take 1 tablet (40 mg total) by mouth once daily.    METFORMIN (GLUCOPHAGE) 1000 MG TABLET    Take 1 tablet (1,000 mg total) by mouth 2 (two) times daily with meals.   Modified Medications    Modified Medication Previous Medication    CLONAZEPAM (KLONOPIN) 1 MG TABLET clonazePAM (KLONOPIN) 1 MG tablet       Take 1 tablet (1 mg total) by mouth nightly as needed.    Take 1 tablet (1 mg total) by mouth nightly as needed.    FENOFIBRATE 160 MG TAB fenofibrate 160 MG Tab       Take 1 tablet (160 mg total) by mouth once daily.    Take 1 tablet (160 mg total) by mouth once  daily.    MELOXICAM (MOBIC) 7.5 MG TABLET meloxicam (MOBIC) 7.5 MG tablet       Take 1 tablet (7.5 mg total) by mouth once daily.    Take 1 tablet (7.5 mg total) by mouth once daily.    ROSUVASTATIN (CRESTOR) 20 MG TABLET rosuvastatin (CRESTOR) 20 MG tablet       Take 1 tablet (20 mg total) by mouth once daily.    Take 1 tablet (20 mg total) by mouth once daily.   Discontinued Medications    TRAMADOL (ULTRAM) 50 MG TABLET    Take 1 tablet (50 mg total) by mouth 2 (two) times daily as needed for Pain.

## 2017-09-06 DIAGNOSIS — I10 ESSENTIAL HYPERTENSION: ICD-10-CM

## 2017-09-06 RX ORDER — LISINOPRIL 40 MG/1
TABLET ORAL
Qty: 90 TABLET | Refills: 0 | Status: SHIPPED | OUTPATIENT
Start: 2017-09-06 | End: 2017-12-20 | Stop reason: SDUPTHER

## 2017-12-20 DIAGNOSIS — M25.511 CHRONIC RIGHT SHOULDER PAIN: ICD-10-CM

## 2017-12-20 DIAGNOSIS — I10 ESSENTIAL HYPERTENSION: ICD-10-CM

## 2017-12-20 DIAGNOSIS — G89.29 CHRONIC RIGHT SHOULDER PAIN: ICD-10-CM

## 2017-12-20 RX ORDER — MELOXICAM 7.5 MG/1
7.5 TABLET ORAL DAILY
Qty: 90 TABLET | Refills: 0 | Status: SHIPPED | OUTPATIENT
Start: 2017-12-20 | End: 2019-03-15 | Stop reason: ALTCHOICE

## 2017-12-20 RX ORDER — LISINOPRIL 40 MG/1
40 TABLET ORAL DAILY
Qty: 90 TABLET | Refills: 0 | Status: SHIPPED | OUTPATIENT
Start: 2017-12-20 | End: 2018-02-26 | Stop reason: SDUPTHER

## 2017-12-20 NOTE — TELEPHONE ENCOUNTER
----- Message from Missy Elaine sent at 12/20/2017 11:46 AM CST -----  Contact: [phone]  Pt requesting a refill on  The following medications sent to [facility] [phone] . Please advise    1. metformin (GLUCOPHAGE) 1000 MG tablet    2. lisinopril (PRINIVIL,ZESTRIL) 40 MG tablet

## 2017-12-27 DIAGNOSIS — Z12.5 PROSTATE CANCER SCREENING: ICD-10-CM

## 2017-12-27 DIAGNOSIS — Z13.29 THYROID DISORDER SCREEN: ICD-10-CM

## 2017-12-27 DIAGNOSIS — Z13.0 SCREENING, ANEMIA, DEFICIENCY, IRON: ICD-10-CM

## 2017-12-27 DIAGNOSIS — E11.69 HYPERLIPIDEMIA ASSOCIATED WITH TYPE 2 DIABETES MELLITUS: ICD-10-CM

## 2017-12-27 DIAGNOSIS — E11.9 TYPE 2 DIABETES MELLITUS WITHOUT COMPLICATION, WITHOUT LONG-TERM CURRENT USE OF INSULIN: ICD-10-CM

## 2017-12-27 DIAGNOSIS — I10 ESSENTIAL HYPERTENSION: Primary | ICD-10-CM

## 2017-12-27 DIAGNOSIS — E78.5 HYPERLIPIDEMIA ASSOCIATED WITH TYPE 2 DIABETES MELLITUS: ICD-10-CM

## 2017-12-27 RX ORDER — METFORMIN HYDROCHLORIDE 1000 MG/1
1000 TABLET ORAL 2 TIMES DAILY WITH MEALS
Qty: 180 TABLET | Refills: 1 | Status: SHIPPED | OUTPATIENT
Start: 2017-12-27 | End: 2018-02-26 | Stop reason: SDUPTHER

## 2017-12-27 NOTE — TELEPHONE ENCOUNTER
----- Message from Jaz Urena sent at 12/27/2017  3:01 PM CST -----  Contact: Christy (wife)/ [phone]  Patients wife called in to get prescription refill for patient.     metformin (GLUCOPHAGE) 1000 MG tablet    Please call and advise.

## 2017-12-28 NOTE — TELEPHONE ENCOUNTER
I filled Metformin.    Please advise patient he is due for fasting labs and WELLNESS EXAM after 1/13/17 - please schedule labs and wellness in January after the 13th.  If they say they will call back to schedule - MAKE SURE that they tell  appt needs to be after 1/13 and it is ANNUAL WELLNESS PHYSICAL

## 2018-02-21 ENCOUNTER — TELEPHONE (OUTPATIENT)
Dept: FAMILY MEDICINE | Facility: CLINIC | Age: 63
End: 2018-02-21

## 2018-02-21 DIAGNOSIS — E78.5 HYPERLIPIDEMIA, UNSPECIFIED HYPERLIPIDEMIA TYPE: ICD-10-CM

## 2018-02-21 DIAGNOSIS — Z13.0 SCREENING FOR IRON DEFICIENCY ANEMIA: Primary | ICD-10-CM

## 2018-02-21 DIAGNOSIS — Z12.5 SPECIAL SCREENING FOR MALIGNANT NEOPLASM OF PROSTATE: ICD-10-CM

## 2018-02-21 DIAGNOSIS — E11.9 DIABETES MELLITUS WITHOUT COMPLICATION: ICD-10-CM

## 2018-02-21 NOTE — TELEPHONE ENCOUNTER
----- Message from Emmanuel Robin sent at 2/21/2018 10:21 AM CST -----  Contact: [phone] self  Patient wanted to inform the nurse he is at [facility] and would like the orders faxed to them. Please call and advise.

## 2018-02-21 NOTE — TELEPHONE ENCOUNTER
----- Message from Justina Patricio sent at 2/21/2018  9:22 AM CST -----  Contact: Self. [phone]  Patient would like to speak with you about his lab work. Please advise

## 2018-02-21 NOTE — TELEPHONE ENCOUNTER
----- Message from Roque Seth sent at 2/21/2018 10:05 AM CST -----  Contact: [phone]/self  Pt states he's returning your call.  Please call and advise

## 2018-02-21 NOTE — TELEPHONE ENCOUNTER
Pt is in [place] to have blood work done need to go to [facility] was charged form labs last time by insurance.

## 2018-02-22 LAB
ALBUMIN SERPL-MCNC: 4.6 G/DL (ref 3.6–5.1)
ALBUMIN/GLOB SERPL: 1.7 (CALC) (ref 1–2.5)
ALP SERPL-CCNC: 64 U/L (ref 40–115)
ALT SERPL-CCNC: 48 U/L (ref 9–46)
AST SERPL-CCNC: 26 U/L (ref 10–35)
BASOPHILS # BLD AUTO: 39 CELLS/UL (ref 0–200)
BASOPHILS NFR BLD AUTO: 0.4 %
BILIRUB SERPL-MCNC: 0.8 MG/DL (ref 0.2–1.2)
BUN SERPL-MCNC: 10 MG/DL (ref 7–25)
BUN/CREAT SERPL: ABNORMAL (CALC) (ref 6–22)
CALCIUM SERPL-MCNC: 9.7 MG/DL (ref 8.6–10.3)
CHLORIDE SERPL-SCNC: 93 MMOL/L (ref 98–110)
CHOLEST SERPL-MCNC: 273 MG/DL
CHOLEST/HDLC SERPL: 6.3 (CALC)
CO2 SERPL-SCNC: 25 MMOL/L (ref 20–31)
CREAT SERPL-MCNC: 0.8 MG/DL (ref 0.7–1.25)
EOSINOPHIL # BLD AUTO: 291 CELLS/UL (ref 15–500)
EOSINOPHIL NFR BLD AUTO: 3 %
ERYTHROCYTE [DISTWIDTH] IN BLOOD BY AUTOMATED COUNT: 12.5 % (ref 11–15)
GFR SERPL CREATININE-BSD FRML MDRD: 96 ML/MIN/1.73M2
GLOBULIN SER CALC-MCNC: 2.7 G/DL (CALC) (ref 1.9–3.7)
GLUCOSE SERPL-MCNC: 117 MG/DL (ref 65–99)
HBA1C MFR BLD: 6.6 % OF TOTAL HGB
HCT VFR BLD AUTO: 46.3 % (ref 38.5–50)
HDLC SERPL-MCNC: 43 MG/DL
HGB BLD-MCNC: 15.9 G/DL (ref 13.2–17.1)
LDLC SERPL CALC-MCNC: ABNORMAL MG/DL (CALC)
LYMPHOCYTES # BLD AUTO: 1668 CELLS/UL (ref 850–3900)
LYMPHOCYTES NFR BLD AUTO: 17.2 %
MCH RBC QN AUTO: 31.9 PG (ref 27–33)
MCHC RBC AUTO-ENTMCNC: 34.3 G/DL (ref 32–36)
MCV RBC AUTO: 93 FL (ref 80–100)
MONOCYTES # BLD AUTO: 902 CELLS/UL (ref 200–950)
MONOCYTES NFR BLD AUTO: 9.3 %
NEUTROPHILS # BLD AUTO: 6800 CELLS/UL (ref 1500–7800)
NEUTROPHILS NFR BLD AUTO: 70.1 %
NONHDLC SERPL-MCNC: 230 MG/DL (CALC)
PLATELET # BLD AUTO: 249 THOUSAND/UL (ref 140–400)
PMV BLD REES-ECKER: 11.2 FL (ref 7.5–12.5)
POTASSIUM SERPL-SCNC: 4.9 MMOL/L (ref 3.5–5.3)
PROT SERPL-MCNC: 7.3 G/DL (ref 6.1–8.1)
PSA SERPL-MCNC: 0.4 NG/ML
RBC # BLD AUTO: 4.98 MILLION/UL (ref 4.2–5.8)
SODIUM SERPL-SCNC: 131 MMOL/L (ref 135–146)
TRIGL SERPL-MCNC: 645 MG/DL
TSH SERPL-ACNC: 2.64 MIU/L (ref 0.4–4.5)
WBC # BLD AUTO: 9.7 THOUSAND/UL (ref 3.8–10.8)

## 2018-02-26 ENCOUNTER — OFFICE VISIT (OUTPATIENT)
Dept: FAMILY MEDICINE | Facility: CLINIC | Age: 63
End: 2018-02-26
Payer: COMMERCIAL

## 2018-02-26 VITALS
WEIGHT: 197.31 LBS | TEMPERATURE: 98 F | SYSTOLIC BLOOD PRESSURE: 136 MMHG | BODY MASS INDEX: 31.71 KG/M2 | OXYGEN SATURATION: 98 % | HEIGHT: 66 IN | DIASTOLIC BLOOD PRESSURE: 86 MMHG | HEART RATE: 93 BPM

## 2018-02-26 DIAGNOSIS — F41.9 ANXIETY: ICD-10-CM

## 2018-02-26 DIAGNOSIS — I10 ESSENTIAL HYPERTENSION: ICD-10-CM

## 2018-02-26 DIAGNOSIS — E11.69 HYPERLIPIDEMIA ASSOCIATED WITH TYPE 2 DIABETES MELLITUS: ICD-10-CM

## 2018-02-26 DIAGNOSIS — R74.01 ELEVATED ALT MEASUREMENT: ICD-10-CM

## 2018-02-26 DIAGNOSIS — E87.1 HYPONATREMIA: ICD-10-CM

## 2018-02-26 DIAGNOSIS — Z00.00 ANNUAL PHYSICAL EXAM: Primary | ICD-10-CM

## 2018-02-26 DIAGNOSIS — E11.9 TYPE 2 DIABETES MELLITUS WITHOUT COMPLICATION, WITHOUT LONG-TERM CURRENT USE OF INSULIN: ICD-10-CM

## 2018-02-26 DIAGNOSIS — E78.5 HYPERLIPIDEMIA ASSOCIATED WITH TYPE 2 DIABETES MELLITUS: ICD-10-CM

## 2018-02-26 PROCEDURE — 99999 PR PBB SHADOW E&M-EST. PATIENT-LVL IV: CPT | Mod: PBBFAC,,, | Performed by: NURSE PRACTITIONER

## 2018-02-26 PROCEDURE — 99396 PREV VISIT EST AGE 40-64: CPT | Mod: S$GLB,,, | Performed by: NURSE PRACTITIONER

## 2018-02-26 RX ORDER — METFORMIN HYDROCHLORIDE 1000 MG/1
1000 TABLET ORAL 2 TIMES DAILY WITH MEALS
Qty: 180 TABLET | Refills: 0 | Status: SHIPPED | OUTPATIENT
Start: 2018-02-26 | End: 2018-10-26 | Stop reason: SDUPTHER

## 2018-02-26 RX ORDER — CHOLECALCIFEROL (VITAMIN D3) 25 MCG
4000 TABLET ORAL DAILY
COMMUNITY

## 2018-02-26 RX ORDER — LISINOPRIL 40 MG/1
40 TABLET ORAL DAILY
Qty: 90 TABLET | Refills: 0 | Status: SHIPPED | OUTPATIENT
Start: 2018-02-26 | End: 2018-06-22 | Stop reason: SDUPTHER

## 2018-02-26 RX ORDER — CLONAZEPAM 1 MG/1
1 TABLET ORAL NIGHTLY PRN
Qty: 30 TABLET | Refills: 2 | Status: SHIPPED | OUTPATIENT
Start: 2018-02-26 | End: 2018-06-22 | Stop reason: SDUPTHER

## 2018-02-26 NOTE — PROGRESS NOTES
"Subjective:       Patient ID: Bartolome Esquivel Jr. is a 63 y.o. male.    Chief Complaint: Annual Exam (wellness)    Patient is here today for annual physical exam with fasting lab results.    Patient has Type 2 Diabetes that is controlled on present medications with  and A1C of 6.6. He is due for EYE exam and advised to make appointment.  He refuses Pneumonia vaccine.  Foot exam done today.     Patient has Hypertension that is controlled on present medication.  /86   Pulse 93   Temp 97.9 °F (36.6 °C) (Oral)   Ht 5' 6" (1.676 m)   Wt 89.5 kg (197 lb 5 oz)   SpO2 98%   BMI 31.85 kg/m²      Patient has Hyperlipidemia - NONCOMPLIANT with Crestor and Fenofibrate.  Patient states that he started with blurry vision and worsening acid reflux so he stopped both medications and the symptoms/side effects resolved.  Advised patient that he should never stop a medication without first discussing with me - he should NOT have stopped both medications as it is highly unlikely that both medications are causing symptoms and now his cholesterol is really uncontrolled with Total cholesterol 273 and Triglycerides 645.    Patient has anxiety and takes Clonazepam only as needed at night.     Patient has chronic right shoulder pain that gets relief with Meloxicam as needed.    WELLNESS LABS:  -  CBC okay.  -  CMP - low sodium level 131 and ALT 48 - reports drinking @ 2 beers a day on weekdays and 10 to 12 beers on weekend nights.   -  Cholesterol discussed above.  -  TSH WNL  -  PSA WNL  -  A1C 6.6%    Health Maintenance:  -  Refuses vaccine.  -  Overdue for diabetic eye exam - advised to make appointment.  -  Overdue for colonoscopy - schedule appointment.        Component      Latest Ref Rng & Units 2/21/2018 6/15/2017   WBC      3.8 - 10.8 Thousand/uL 9.7    RBC      4.20 - 5.80 Million/uL 4.98    Hemoglobin      13.2 - 17.1 g/dL 15.9    Hematocrit      38.5 - 50.0 % 46.3    MCV      80.0 - 100.0 fL 93.0    MCH      " 27.0 - 33.0 pg 31.9    MCHC      32.0 - 36.0 g/dL 34.3    RDW      11.0 - 15.0 % 12.5    Platelets      140 - 400 Thousand/uL 249    MPV      7.5 - 12.5 fL 11.2    Gran # (ANC)      1.8 - 7.7 K/uL     Lymph #      850 - 3,900 cells/uL 1,668    Mono #      200 - 950 cells/uL 902    Eos #      15 - 500 cells/uL 291    Baso #      0 - 200 cells/uL 39    Gran%      38.0 - 73.0 %     Lymph%      % 17.2    Mono%      % 9.3    Eosinophil%      % 3.0    Basophil%      % 0.4    Differential Method           Neutrophils Absolute      1,500 - 7,800 cells/uL 6,800    Neutrophils Relative      % 70.1    Glucose      65 - 99 mg/dL 117 (H) 136 (H)   BUN, Bld      7 - 25 mg/dL 10 14   Creatinine      0.70 - 1.25 mg/dL 0.80 0.78   eGFR if non       > OR = 60 mL/min/1.73m2 96 >60.0   eGFR if       > OR = 60 mL/min/1.73m2 112 >60.0   BUN/Creatinine Ratio      6 - 22 (calc) NOT APPLICABLE    Sodium      135 - 146 mmol/L 131 (L) 137   Potassium      3.5 - 5.3 mmol/L 4.9 5.3 (H)   Chloride      98 - 110 mmol/L 93 (L) 96   CO2      20 - 31 mmol/L 25 27   Calcium      8.6 - 10.3 mg/dL 9.7 10.0   Total Protein      6.1 - 8.1 g/dL 7.3 8.0   Albumin      3.6 - 5.1 g/dL 4.6 4.9   Globulin, Total      1.9 - 3.7 g/dL (calc) 2.7    Albumin/Globulin Ratio      1.0 - 2.5 (calc) 1.7    Total Bilirubin      0.2 - 1.2 mg/dL 0.8 0.8   Alkaline Phosphatase      40 - 115 U/L 64 56   AST      10 - 35 U/L 26 39   ALT      9 - 46 U/L 48 (H) 60 (H)   Anion Gap      8 - 16 mmol/L  14   Cholesterol      <200 mg/dL 273 (H) 182   Triglycerides      <150 mg/dL 645 (H) 310 (H)   HDL      >40 mg/dL 43 45   LDL Cholesterol      mg/dL (calc)  75.0   HDL/Chol Ratio      <5.0 (calc) 6.3 (H) 24.7   Total Cholesterol/HDL Ratio      2.0 - 5.0  4.0   Non-HDL Cholesterol      mg/dL  137   Non HDL Chol. (LDL+VLDL)      <130 mg/dL (calc) 230 (H)    Hemoglobin A1C      4.0 - 5.6 %  6.5 (H)   Estimated Avg Glucose      68 - 131 mg/dL  140 (H)    TSH      0.40 - 4.50 mIU/L 2.64    PSA, SCREEN      0.00 - 4.00 ng/mL     PROSTATE SPECIFIC ANTIGEN, SCR - [facility]      < OR = 4.0 ng/mL 0.4    A1c      <5.7 % of total Hgb 6.6 (H)          Previous Medications    ASPIRIN (ECOTRIN) 81 MG EC TABLET    Take 1 tablet (81 mg total) by mouth once daily.    CLONAZEPAM (KLONOPIN) 1 MG TABLET    Take 1 tablet (1 mg total) by mouth nightly as needed.    FENOFIBRATE 160 MG TAB    Take 1 tablet (160 mg total) by mouth once daily.    LISINOPRIL (PRINIVIL,ZESTRIL) 40 MG TABLET    Take 1 tablet (40 mg total) by mouth once daily.    MELOXICAM (MOBIC) 7.5 MG TABLET    Take 1 tablet (7.5 mg total) by mouth once daily.    METFORMIN (GLUCOPHAGE) 1000 MG TABLET    Take 1 tablet (1,000 mg total) by mouth 2 (two) times daily with meals.    ROSUVASTATIN (CRESTOR) 20 MG TABLET    Take 1 tablet (20 mg total) by mouth once daily.    VITAMIN D 1000 UNITS TAB    Take 4,000 Units by mouth once daily.       Past Medical History:   Diagnosis Date    Anxiety     DM (diabetes mellitus) 11/4/2014    Hyperlipidemia     Hypertension     Impaired fasting glucose     Metabolic syndrome        Past Surgical History:   Procedure Laterality Date    COLONOSCOPY  2007    + polyp - tubular adenoma - Dr. Hernandez       Family History   Problem Relation Age of Onset    Cancer Mother      Colon    Heart disease Father 49     MI    Diabetes Father     No Known Problems Daughter     No Known Problems Son     No Known Problems Son        Social History     Social History    Marital status:      Spouse name: N/A    Number of children: N/A    Years of education: N/A     Occupational History     Replaced by [facility]     Social History Main Topics    Smoking status: Former Smoker     Packs/day: 1.00     Years: 15.00     Types: Cigarettes     Quit date: 1/4/1994    Smokeless tobacco: None      Comment: Quit in 1994    Alcohol use Yes      Comment: Approx 20 beers/week    Drug use: No    Sexual activity:  "Not Asked     Other Topics Concern    None     Social History Narrative    None       Review of Systems   Constitutional: Negative for activity change, appetite change, fatigue, fever and unexpected weight change.   HENT: Negative for congestion, ear pain, mouth sores, nosebleeds, postnasal drip, rhinorrhea, sinus pressure, sneezing, sore throat, trouble swallowing and voice change.    Eyes: Negative.    Respiratory: Negative for cough, chest tightness and shortness of breath.    Cardiovascular: Negative for chest pain, palpitations and leg swelling.   Gastrointestinal: Negative.  Negative for abdominal pain, blood in stool, constipation, diarrhea, nausea and vomiting.   Endocrine: Negative.    Genitourinary: Negative for difficulty urinating, dysuria, flank pain, hematuria and urgency.   Musculoskeletal: Negative for arthralgias, back pain, gait problem, joint swelling, myalgias and neck pain.   Skin: Negative for color change, rash and wound.   Allergic/Immunologic: Negative for immunocompromised state.   Neurological: Negative for dizziness, tremors, seizures, syncope, speech difficulty and headaches.   Hematological: Negative for adenopathy. Does not bruise/bleed easily.   Psychiatric/Behavioral: Negative for behavioral problems, dysphoric mood, sleep disturbance and suicidal ideas. The patient is not nervous/anxious.          Objective:     Vitals:    02/26/18 1031 02/26/18 1107   BP: (!) 140/90 136/86   BP Location: Left arm    Patient Position: Sitting    BP Method: Medium (Manual)    Pulse: 93    Temp: 97.9 °F (36.6 °C)    TempSrc: Oral    SpO2: 98%    Weight: 89.5 kg (197 lb 5 oz)    Height: 5' 6" (1.676 m)           Physical Exam   Constitutional: He is oriented to person, place, and time. He appears well-developed and well-nourished. No distress.   + obesity. Body mass index is 31.85 kg/m².       HENT:   Head: Normocephalic and atraumatic.   Right Ear: External ear normal.   Left Ear: External ear " normal.   Nose: Nose normal.   Mouth/Throat: Oropharynx is clear and moist. No oropharyngeal exudate.   Eyes: Conjunctivae and EOM are normal. Pupils are equal, round, and reactive to light. Right eye exhibits no discharge. Left eye exhibits no discharge. No scleral icterus.   Neck: Normal range of motion. Neck supple. No JVD present. No tracheal deviation present. No thyromegaly present.   Cardiovascular: Normal rate.    No murmur heard.  Pulses:       Dorsalis pedis pulses are 2+ on the right side, and 2+ on the left side.        Posterior tibial pulses are 2+ on the right side, and 2+ on the left side.   + irregular rate -  premature contractions - monitored by cardiology Dr. Baker   Pulmonary/Chest: Effort normal and breath sounds normal. No stridor. No respiratory distress. He has no wheezes. He has no rales.   Abdominal: Soft. Bowel sounds are normal. He exhibits no distension. There is no tenderness. There is no rebound and no guarding. A hernia is present.       Patient with ? Ventral hernia versus diastasis recti; nontender, only present with abdominal tension   Musculoskeletal: Normal range of motion. He exhibits no edema.        Right foot: There is normal range of motion and no deformity.        Left foot: There is normal range of motion and no deformity.   Feet:   Right Foot:   Protective Sensation: 8 sites tested. 8 sites sensed.   Skin Integrity: Negative for ulcer or skin breakdown.   Left Foot:   Protective Sensation: 8 sites tested. 8 sites sensed.   Skin Integrity: Negative for ulcer or skin breakdown.   Lymphadenopathy:     He has no cervical adenopathy.   Neurological: He is alert and oriented to person, place, and time. Coordination normal.   Skin: Skin is warm and dry. No rash noted. He is not diaphoretic.   Psychiatric: He has a normal mood and affect. His behavior is normal.         Assessment:         ICD-10-CM ICD-9-CM   1. Annual physical exam Z00.00 V70.0   2. Type 2 diabetes mellitus  without complication, without long-term current use of insulin E11.9 250.00   3. Essential hypertension I10 401.9   4. Hyperlipidemia associated with type 2 diabetes mellitus E11.69 250.80    E78.5 272.4   5. Anxiety F41.9 300.00   6. Hyponatremia E87.1 276.1   7. Elevated ALT measurement R74.0 790.4       Plan:       Annual physical exam  Health Maintenance:  -  Refuses vaccine.  -  Overdue for diabetic eye exam - advised to make appointment.  -  Overdue for colonoscopy - schedule appointment.    Health Maintenance Summary     Colonoscopy Overdue 6/11/2017     Done 6/11/2007 Dr. Hernandez - tubular adenoma   Eye Exam Overdue 12/20/2017     Done 12/20/2016 Dr. Humphries - no diabetic retinopathy   Pneumococcal PPSV23 (Medium Risk) Postponed 6/11/2018 Originally 1/8/1975. Patient Refused   Low Dose Statin Next Due 6/16/2018     Done 6/16/2017 Registry Metric: Last Current Statin Reviewed Date   Hemoglobin A1c Next Due 8/21/2018     Done 2/21/2018 HEMOGLOBIN A1C  A1c           Done 6/15/2017 HEMOGLOBIN A1C  Hemoglobin A1C           Done 1/12/2017 HEMOGLOBIN A1C  Hemoglobin A1C           Done 6/22/2016 HEMOGLOBIN A1C  Hemoglobin A1C           Done 11/4/2015 HEMOGLOBIN A1C  Hemoglobin A1C           Patient has more history with this topic...   Lipid Panel Next Due 2/21/2019     Done 2/21/2018 LIPID PANEL     Done 6/15/2017 LIPID PANEL     Done 1/12/2017 LIPID PANEL     Done 6/22/2016 LIPID PANEL     Done 11/4/2015 LIPID PANEL     Patient has more history with this topic...   Foot Exam Next Due 2/26/2019     Done 2/26/2018     Done 1/13/2017 [employer]: WORKFLOW - DIABETES - DIABETIC FOOT EXAM PERFORMED    Done 6/23/2016 [employer]: WORKFLOW - DIABETES - DIABETIC FOOT EXAM PERFORMED    Done 6/23/2016     Done 11/6/2015     Patient has more history with this topic...   TETANUS VACCINE Next Due 6/23/2026     Declined 6/23/2016    Hepatitis C Screening Completed     Done 6/22/2016 HEPATITIS C ANTIBODY   Zoster Vaccine Addressed      Declined 6/16/2017    Influenza Vaccine Addressed     Declined 2/26/2018     Declined 1/13/2017     Declined 11/6/2015          Type 2 diabetes mellitus without complication, without long-term current use of insulin  -  Controlled on Metformin at present dose - recheck in 3 months.  -     Comprehensive metabolic panel; Future; Expected date: 05/21/2018  -     Hemoglobin A1c; Future; Expected date: 05/21/2018  -     metFORMIN (GLUCOPHAGE) 1000 MG tablet; Take 1 tablet (1,000 mg total) by mouth 2 (two) times daily with meals.  Dispense: 180 tablet; Refill: 0    Essential hypertension  -  Controlled on present medication - stricter lifestyle modificatons.  -     lisinopril (PRINIVIL,ZESTRIL) 40 MG tablet; Take 1 tablet (40 mg total) by mouth once daily.  Dispense: 90 tablet; Refill: 0    Hyperlipidemia associated with type 2 diabetes mellitus  -  Get back on Crestor 20 mg daily and recheck in 3 months - call ME if you have any side effects of taking medication before stopping.  -     Lipid panel; Future; Expected date: 05/21/2018    Anxiety  -  Controlled on present medication = recheck in 3 months.  -     clonazePAM (KLONOPIN) 1 MG tablet; Take 1 tablet (1 mg total) by mouth nightly as needed.  Dispense: 30 tablet; Refill: 2    Hyponatremia  -  Decrease alcohol intake.  Will recheck in 3 months.    Elevated ALT measurement  -  STOP ETOH.  No Tylenol - recheck in couple months.      Follow-up in about 3 months (around 5/26/2018) for fasting labs and office visit.     Patient's Medications   New Prescriptions    No medications on file   Previous Medications    ASPIRIN (ECOTRIN) 81 MG EC TABLET    Take 1 tablet (81 mg total) by mouth once daily.    MELOXICAM (MOBIC) 7.5 MG TABLET    Take 1 tablet (7.5 mg total) by mouth once daily.    ROSUVASTATIN (CRESTOR) 20 MG TABLET    Take 1 tablet (20 mg total) by mouth once daily.    VITAMIN D 1000 UNITS TAB    Take 4,000 Units by mouth once daily.   Modified Medications     Modified Medication Previous Medication    CLONAZEPAM (KLONOPIN) 1 MG TABLET clonazePAM (KLONOPIN) 1 MG tablet       Take 1 tablet (1 mg total) by mouth nightly as needed.    Take 1 tablet (1 mg total) by mouth nightly as needed.    LISINOPRIL (PRINIVIL,ZESTRIL) 40 MG TABLET lisinopril (PRINIVIL,ZESTRIL) 40 MG tablet       Take 1 tablet (40 mg total) by mouth once daily.    Take 1 tablet (40 mg total) by mouth once daily.    METFORMIN (GLUCOPHAGE) 1000 MG TABLET metFORMIN (GLUCOPHAGE) 1000 MG tablet       Take 1 tablet (1,000 mg total) by mouth 2 (two) times daily with meals.    Take 1 tablet (1,000 mg total) by mouth 2 (two) times daily with meals.   Discontinued Medications    FENOFIBRATE 160 MG TAB    Take 1 tablet (160 mg total) by mouth once daily.

## 2018-06-18 ENCOUNTER — TELEPHONE (OUTPATIENT)
Dept: FAMILY MEDICINE | Facility: CLINIC | Age: 63
End: 2018-06-18

## 2018-06-18 NOTE — TELEPHONE ENCOUNTER
----- Message from Jaz Urena sent at 6/18/2018  3:17 PM CDT -----  Contact: Self/ [phone] or [phone]  Patient is requesting orders for lab work.     Please call and advise.

## 2018-06-21 LAB
ALBUMIN SERPL-MCNC: 4.9 G/DL (ref 3.6–5.1)
ALBUMIN/GLOB SERPL: 2.1 (CALC) (ref 1–2.5)
ALP SERPL-CCNC: 65 U/L (ref 40–115)
ALT SERPL-CCNC: 49 U/L (ref 9–46)
AST SERPL-CCNC: 27 U/L (ref 10–35)
BILIRUB SERPL-MCNC: 0.7 MG/DL (ref 0.2–1.2)
BUN SERPL-MCNC: 14 MG/DL (ref 7–25)
BUN/CREAT SERPL: ABNORMAL (CALC) (ref 6–22)
CALCIUM SERPL-MCNC: 10.2 MG/DL (ref 8.6–10.3)
CHLORIDE SERPL-SCNC: 96 MMOL/L (ref 98–110)
CHOLEST SERPL-MCNC: 172 MG/DL
CHOLEST/HDLC SERPL: 3.6 (CALC)
CO2 SERPL-SCNC: 27 MMOL/L (ref 20–31)
CREAT SERPL-MCNC: 0.83 MG/DL (ref 0.7–1.25)
GFR SERPL CREATININE-BSD FRML MDRD: 95 ML/MIN/1.73M2
GLOBULIN SER CALC-MCNC: 2.3 G/DL (CALC) (ref 1.9–3.7)
GLUCOSE SERPL-MCNC: 176 MG/DL (ref 65–99)
HBA1C MFR BLD: 7.4 % OF TOTAL HGB
HDLC SERPL-MCNC: 48 MG/DL
LDLC SERPL CALC-MCNC: 83 MG/DL (CALC)
NONHDLC SERPL-MCNC: 124 MG/DL (CALC)
POTASSIUM SERPL-SCNC: 5.1 MMOL/L (ref 3.5–5.3)
PROT SERPL-MCNC: 7.2 G/DL (ref 6.1–8.1)
SODIUM SERPL-SCNC: 135 MMOL/L (ref 135–146)
TRIGL SERPL-MCNC: 340 MG/DL

## 2018-06-22 ENCOUNTER — OFFICE VISIT (OUTPATIENT)
Dept: FAMILY MEDICINE | Facility: CLINIC | Age: 63
End: 2018-06-22
Payer: COMMERCIAL

## 2018-06-22 VITALS
SYSTOLIC BLOOD PRESSURE: 130 MMHG | BODY MASS INDEX: 31.34 KG/M2 | HEIGHT: 66 IN | WEIGHT: 195 LBS | HEART RATE: 85 BPM | OXYGEN SATURATION: 97 % | TEMPERATURE: 98 F | DIASTOLIC BLOOD PRESSURE: 88 MMHG

## 2018-06-22 DIAGNOSIS — E78.5 HYPERLIPIDEMIA ASSOCIATED WITH TYPE 2 DIABETES MELLITUS: ICD-10-CM

## 2018-06-22 DIAGNOSIS — E11.9 TYPE 2 DIABETES MELLITUS WITHOUT COMPLICATION, WITHOUT LONG-TERM CURRENT USE OF INSULIN: Primary | ICD-10-CM

## 2018-06-22 DIAGNOSIS — Z12.11 COLON CANCER SCREENING: ICD-10-CM

## 2018-06-22 DIAGNOSIS — R74.01 ELEVATED ALT MEASUREMENT: ICD-10-CM

## 2018-06-22 DIAGNOSIS — I10 ESSENTIAL HYPERTENSION: ICD-10-CM

## 2018-06-22 DIAGNOSIS — F41.9 ANXIETY: ICD-10-CM

## 2018-06-22 DIAGNOSIS — E11.69 HYPERLIPIDEMIA ASSOCIATED WITH TYPE 2 DIABETES MELLITUS: ICD-10-CM

## 2018-06-22 PROCEDURE — 99999 PR PBB SHADOW E&M-EST. PATIENT-LVL IV: CPT | Mod: PBBFAC,,, | Performed by: NURSE PRACTITIONER

## 2018-06-22 PROCEDURE — 3008F BODY MASS INDEX DOCD: CPT | Mod: CPTII,S$GLB,, | Performed by: NURSE PRACTITIONER

## 2018-06-22 PROCEDURE — 3075F SYST BP GE 130 - 139MM HG: CPT | Mod: CPTII,S$GLB,, | Performed by: NURSE PRACTITIONER

## 2018-06-22 PROCEDURE — 3079F DIAST BP 80-89 MM HG: CPT | Mod: CPTII,S$GLB,, | Performed by: NURSE PRACTITIONER

## 2018-06-22 PROCEDURE — 99214 OFFICE O/P EST MOD 30 MIN: CPT | Mod: S$GLB,,, | Performed by: NURSE PRACTITIONER

## 2018-06-22 PROCEDURE — 3045F PR MOST RECENT HEMOGLOBIN A1C LEVEL 7.0-9.0%: CPT | Mod: CPTII,S$GLB,, | Performed by: NURSE PRACTITIONER

## 2018-06-22 RX ORDER — CLONAZEPAM 1 MG/1
1 TABLET ORAL NIGHTLY PRN
Qty: 30 TABLET | Refills: 2 | Status: SHIPPED | OUTPATIENT
Start: 2018-06-22 | End: 2018-10-26 | Stop reason: SDUPTHER

## 2018-06-22 RX ORDER — PNV NO.95/FERROUS FUM/FOLIC AC 28MG-0.8MG
100 TABLET ORAL DAILY
COMMUNITY

## 2018-06-22 RX ORDER — ROSUVASTATIN CALCIUM 40 MG/1
40 TABLET, COATED ORAL DAILY
Qty: 90 TABLET | Refills: 1 | Status: SHIPPED | OUTPATIENT
Start: 2018-06-22 | End: 2018-10-26 | Stop reason: SDUPTHER

## 2018-06-22 RX ORDER — LISINOPRIL 40 MG/1
40 TABLET ORAL DAILY
Qty: 90 TABLET | Refills: 0 | Status: SHIPPED | OUTPATIENT
Start: 2018-06-22 | End: 2018-10-26 | Stop reason: SDUPTHER

## 2018-06-22 NOTE — PROGRESS NOTES
Subjective:       Patient ID: Bartolome Esquivel Jr. is a 63 y.o. male.    Chief Complaint: Follow-up (lab results)    Patient is a 63 year old white male with Type 2 Diabetes, Hypertension, Hyperlipidemia, Anxiety and chronic arthralgia that is here today for 3 month follow up with fasting lab results.    Patient has Type 2 Diabetes that is normally well controlled on Metformin 1000 mg twice daily.  His IFG shot up from 117 3 months ago to now 176.  His A1C increased from 6.6 to 7.4%.  Patient reports that he has been taking his Metformin twice daily as directed but does admit that he found another bottle of Metformin at home not even opened - so unsure why he now has so many Metformin left over - advised to go home and make sure he is taking the 1000 mg dose twice daily and make sure he takes it every single day as directed and will recheck in 3 months. Advised to get yearly diabetic eye exam.    Patient has Hypertension and currently taking Lisinopril 40 mg daily.  Blood pressure is on the higher end of normal - advised on stricter lifestyle modifications and if not < 130/80 by next visit, will need to adjust medications.    Patient has Hyperlipidemia.  At last visit, he admitted he was noncompliant with Crestor and Fenofibrate because it was causing worsening acid reflux so he stopped both.  Patient was started back on Crestor 20 mg and Fenofibrate stopped.  The reflux has resolved.  The Total cholesterol is now down from 273 to 172.  The Triglycerides are down from 645 to 340 and the LDL is now 83.    Patient has anxiety and takes Clonazepam only as needed at night.     Patient has chronic right shoulder pain that gets relief with Meloxicam as needed.    Patient has mildly elevated ALT since June 2017 that we are monitoring every 6 months.  Suspect fatty liver.  ALT 49             Component      Latest Ref Rng & Units 6/20/2018 2/21/2018 6/15/2017   Glucose      65 - 99 mg/dL 176 (H) 117 (H) 136 (H)   BUN, Bld       7 - 25 mg/dL 14 10 14   Creatinine      0.70 - 1.25 mg/dL 0.83 0.80 0.78   eGFR if non       > OR = 60 mL/min/1.73m2 95 96 >60.0   eGFR if       > OR = 60 mL/min/1.73m2 110 112 >60.0   BUN/Creatinine Ratio      6 - 22 (calc) NOT APPLICABLE NOT APPLICABLE    Sodium      135 - 146 mmol/L 135 131 (L) 137   Potassium      3.5 - 5.3 mmol/L 5.1 4.9 5.3 (H)   Chloride      98 - 110 mmol/L 96 (L) 93 (L) 96   CO2      20 - 31 mmol/L 27 25 27   Calcium      8.6 - 10.3 mg/dL 10.2 9.7 10.0   Total Protein      6.1 - 8.1 g/dL 7.2 7.3 8.0   Albumin      3.6 - 5.1 g/dL 4.9 4.6 4.9   Globulin, Total      1.9 - 3.7 g/dL (calc) 2.3 2.7    Albumin/Globulin Ratio      1.0 - 2.5 (calc) 2.1 1.7    Total Bilirubin      0.2 - 1.2 mg/dL 0.7 0.8 0.8   Alkaline Phosphatase      40 - 115 U/L 65 64 56   AST      10 - 35 U/L 27 26 39   ALT      9 - 46 U/L 49 (H) 48 (H) 60 (H)   Anion Gap      8 - 16 mmol/L   14   Cholesterol      <200 mg/dL 172 273 (H) 182   Triglycerides      <150 mg/dL 340 (H) 645 (H) 310 (H)   HDL      >40 mg/dL 48 43 45   LDL Cholesterol      mg/dL (calc) 83  75.0   HDL/Chol Ratio      <5.0 (calc) 3.6 6.3 (H) 24.7   Total Cholesterol/HDL Ratio      2.0 - 5.0   4.0   Non-HDL Cholesterol      mg/dL   137   Non HDL Chol. (LDL+VLDL)      <130 mg/dL (calc) 124 230 (H)    Hemoglobin A1C      4.0 - 5.6 %   6.5 (H)   Estimated Avg Glucose      68 - 131 mg/dL   140 (H)   A1c      <5.7 % of total Hgb 7.4 (H) 6.6 (H)        Previous Medications    ASPIRIN (ECOTRIN) 81 MG EC TABLET    Take 1 tablet (81 mg total) by mouth once daily.    CLONAZEPAM (KLONOPIN) 1 MG TABLET    Take 1 tablet (1 mg total) by mouth nightly as needed.    CYANOCOBALAMIN (VITAMIN B-12) 100 MCG TABLET    Take 100 mcg by mouth once daily.    LISINOPRIL (PRINIVIL,ZESTRIL) 40 MG TABLET    Take 1 tablet (40 mg total) by mouth once daily.    MELOXICAM (MOBIC) 7.5 MG TABLET    Take 1 tablet (7.5 mg total) by mouth once daily.    METFORMIN  (GLUCOPHAGE) 1000 MG TABLET    Take 1 tablet (1,000 mg total) by mouth 2 (two) times daily with meals.    ROSUVASTATIN (CRESTOR) 20 MG TABLET    Take 1 tablet (20 mg total) by mouth once daily.    VITAMIN D 1000 UNITS TAB    Take 4,000 Units by mouth once daily.       Past Medical History:   Diagnosis Date    Anxiety     DM (diabetes mellitus) 11/4/2014    Hyperlipidemia     Hypertension     Impaired fasting glucose     Metabolic syndrome        Past Surgical History:   Procedure Laterality Date    COLONOSCOPY  2007    + polyp - tubular adenoma - Dr. Hernandez       Family History   Problem Relation Age of Onset    Cancer Mother         Colon    Heart disease Father 49        MI    Diabetes Father     No Known Problems Daughter     No Known Problems Son     No Known Problems Son        Social History     Social History    Marital status:      Spouse name: N/A    Number of children: N/A    Years of education: N/A     Occupational History     [employer]     Social History Main Topics    Smoking status: Former Smoker     Packs/day: 1.00     Years: 15.00     Types: Cigarettes     Quit date: 1/4/1994    Smokeless tobacco: None      Comment: Quit in 1994    Alcohol use Yes      Comment: Reports 1 to 2 beers per day on weekdays and 10 beers per day on weekend days    Drug use: No    Sexual activity: Not Asked     Other Topics Concern    None     Social History Narrative    None       Review of Systems   Constitutional: Negative for activity change, appetite change, fatigue, fever and unexpected weight change.   HENT: Negative for congestion, ear pain, mouth sores, nosebleeds, postnasal drip, rhinorrhea, sinus pressure, sneezing, sore throat, trouble swallowing and voice change.    Eyes: Negative.    Respiratory: Negative for cough, chest tightness and shortness of breath.    Cardiovascular: Negative for chest pain, palpitations and leg swelling.   Gastrointestinal: Negative.  Negative for  "abdominal pain, blood in stool, constipation, diarrhea, nausea and vomiting.   Endocrine: Negative.    Genitourinary: Negative for difficulty urinating, dysuria, flank pain, hematuria and urgency.   Musculoskeletal: Negative for arthralgias, back pain, gait problem, joint swelling, myalgias and neck pain.   Skin: Negative for color change, rash and wound.   Allergic/Immunologic: Negative for immunocompromised state.   Neurological: Negative for dizziness, tremors, seizures, syncope, speech difficulty and headaches.   Hematological: Negative for adenopathy. Does not bruise/bleed easily.   Psychiatric/Behavioral: Negative for behavioral problems, dysphoric mood, sleep disturbance and suicidal ideas. The patient is not nervous/anxious.          Objective:     Vitals:    06/22/18 0951 06/22/18 1020   BP: (!) 130/90 130/88   BP Location: Right arm    Patient Position: Sitting    BP Method: Medium (Manual)    Pulse: 85    Temp: 98.3 °F (36.8 °C)    TempSrc: Oral    SpO2: 97%    Weight: 88.5 kg (195 lb)    Height: 5' 6" (1.676 m)           Physical Exam   Constitutional: He is oriented to person, place, and time. He appears well-developed and well-nourished. No distress.   + obesity. Body mass index is 31.85 kg/m².       HENT:   Head: Normocephalic and atraumatic.   Right Ear: External ear normal.   Left Ear: External ear normal.   Nose: Nose normal.   Mouth/Throat: Oropharynx is clear and moist. No oropharyngeal exudate.   Eyes: Conjunctivae and EOM are normal. Pupils are equal, round, and reactive to light. Right eye exhibits no discharge. Left eye exhibits no discharge. No scleral icterus.   Neck: Normal range of motion. Neck supple. No JVD present. No tracheal deviation present. No thyromegaly present.   Cardiovascular: Normal rate and regular rhythm.    No murmur heard.  Pulses:       Dorsalis pedis pulses are 2+ on the right side, and 2+ on the left side.        Posterior tibial pulses are 2+ on the right side, and " 2+ on the left side.   History premature contractions - monitored by cardiology Dr. aBker   Pulmonary/Chest: Effort normal and breath sounds normal. No stridor. No respiratory distress. He has no wheezes. He has no rales.   Abdominal: Soft. Bowel sounds are normal. He exhibits no distension. There is no tenderness. There is no rebound and no guarding. A hernia is present.       Patient with ? Ventral hernia versus diastasis recti; nontender, only present with abdominal tension   Musculoskeletal: Normal range of motion. He exhibits no edema.        Right foot: There is normal range of motion and no deformity.        Left foot: There is normal range of motion and no deformity.   Feet:   Right Foot:   Protective Sensation: 8 sites tested. 8 sites sensed.   Skin Integrity: Negative for ulcer or skin breakdown.   Left Foot:   Protective Sensation: 8 sites tested. 8 sites sensed.   Skin Integrity: Negative for ulcer or skin breakdown.   Lymphadenopathy:     He has no cervical adenopathy.   Neurological: He is alert and oriented to person, place, and time. Coordination normal.   Skin: Skin is warm and dry. No rash noted. He is not diaphoretic.   Psychiatric: He has a normal mood and affect. His behavior is normal.         Assessment:         ICD-10-CM ICD-9-CM   1. Type 2 diabetes mellitus without complication, without long-term current use of insulin E11.9 250.00   2. Essential hypertension I10 401.9   3. Hyperlipidemia associated with type 2 diabetes mellitus E11.69 250.80    E78.5 272.4   4. Anxiety F41.9 300.00   5. Elevated ALT measurement R74.0 790.4   6. Colon cancer screening Z12.11 V76.51       Plan:       Type 2 diabetes mellitus without complication, without long-term current use of insulin  -  Go home and make sure that he is taking Metformin 1000 mg twice daily and take as directed next 3 months and repeat fasting labs and visit in 3 months.  -     Hemoglobin A1c; Future; Expected date: 09/17/2018  -      Comprehensive metabolic panel; Future; Expected date: 09/17/2018    Essential hypertension  -  Continue lisinopril daily and stricter lifestyle modifications.  -     lisinopril (PRINIVIL,ZESTRIL) 40 MG tablet; Take 1 tablet (40 mg total) by mouth once daily.  Dispense: 90 tablet; Refill: 0    Hyperlipidemia associated with type 2 diabetes mellitus  -  Increase the Crestor from 20 to 40 mg daily and recheck in 3 months.  -     rosuvastatin (CRESTOR) 40 MG Tab; Take 1 tablet (40 mg total) by mouth once daily.  Dispense: 90 tablet; Refill: 1  -     Lipid panel; Future; Expected date: 09/17/2018    Anxiety  -  Take at bedtime prn  -     clonazePAM (KLONOPIN) 1 MG tablet; Take 1 tablet (1 mg total) by mouth nightly as needed.  Dispense: 30 tablet; Refill: 2    Elevated ALT measurement  - low fat diet.      Follow-up in about 3 months (around 9/22/2018) for fasting labs and office visit.     Patient's Medications   New Prescriptions    No medications on file   Previous Medications    ASPIRIN (ECOTRIN) 81 MG EC TABLET    Take 1 tablet (81 mg total) by mouth once daily.    CYANOCOBALAMIN (VITAMIN B-12) 100 MCG TABLET    Take 100 mcg by mouth once daily.    MELOXICAM (MOBIC) 7.5 MG TABLET    Take 1 tablet (7.5 mg total) by mouth once daily.    METFORMIN (GLUCOPHAGE) 1000 MG TABLET    Take 1 tablet (1,000 mg total) by mouth 2 (two) times daily with meals.    VITAMIN D 1000 UNITS TAB    Take 4,000 Units by mouth once daily.   Modified Medications    Modified Medication Previous Medication    CLONAZEPAM (KLONOPIN) 1 MG TABLET clonazePAM (KLONOPIN) 1 MG tablet       Take 1 tablet (1 mg total) by mouth nightly as needed.    Take 1 tablet (1 mg total) by mouth nightly as needed.    LISINOPRIL (PRINIVIL,ZESTRIL) 40 MG TABLET lisinopril (PRINIVIL,ZESTRIL) 40 MG tablet       Take 1 tablet (40 mg total) by mouth once daily.    Take 1 tablet (40 mg total) by mouth once daily.    ROSUVASTATIN (CRESTOR) 40 MG TAB rosuvastatin (CRESTOR)  20 MG tablet       Take 1 tablet (40 mg total) by mouth once daily.    Take 1 tablet (20 mg total) by mouth once daily.   Discontinued Medications    No medications on file

## 2018-07-31 ENCOUNTER — TELEPHONE (OUTPATIENT)
Dept: GASTROENTEROLOGY | Facility: CLINIC | Age: 63
End: 2018-07-31

## 2018-07-31 NOTE — TELEPHONE ENCOUNTER
Referral was sent from Nohemy Carranza NP to schedule an Colonoscopy, left an voicemail for patient to call the office back in regards to scheduling procedure.

## 2018-09-07 DIAGNOSIS — E11.9 TYPE 2 DIABETES MELLITUS WITHOUT COMPLICATION, UNSPECIFIED WHETHER LONG TERM INSULIN USE: ICD-10-CM

## 2018-10-24 LAB
ALBUMIN SERPL-MCNC: 4.8 G/DL (ref 3.6–5.1)
ALBUMIN/GLOB SERPL: 2.2 (CALC) (ref 1–2.5)
ALP SERPL-CCNC: 75 U/L (ref 40–115)
ALT SERPL-CCNC: 78 U/L (ref 9–46)
AST SERPL-CCNC: 41 U/L (ref 10–35)
BILIRUB SERPL-MCNC: 0.5 MG/DL (ref 0.2–1.2)
BUN SERPL-MCNC: 9 MG/DL (ref 7–25)
BUN/CREAT SERPL: ABNORMAL (CALC) (ref 6–22)
CALCIUM SERPL-MCNC: 10.1 MG/DL (ref 8.6–10.3)
CHLORIDE SERPL-SCNC: 97 MMOL/L (ref 98–110)
CHOLEST SERPL-MCNC: 143 MG/DL
CHOLEST/HDLC SERPL: 3.3 (CALC)
CO2 SERPL-SCNC: 28 MMOL/L (ref 20–32)
CREAT SERPL-MCNC: 0.76 MG/DL (ref 0.7–1.25)
GFR SERPL CREATININE-BSD FRML MDRD: 98 ML/MIN/1.73M2
GLOBULIN SER CALC-MCNC: 2.2 G/DL (CALC) (ref 1.9–3.7)
GLUCOSE SERPL-MCNC: 188 MG/DL (ref 65–99)
HBA1C MFR BLD: 7.5 % OF TOTAL HGB
HDLC SERPL-MCNC: 43 MG/DL
LDLC SERPL CALC-MCNC: 61 MG/DL (CALC)
NONHDLC SERPL-MCNC: 100 MG/DL (CALC)
POTASSIUM SERPL-SCNC: 5.1 MMOL/L (ref 3.5–5.3)
PROT SERPL-MCNC: 7 G/DL (ref 6.1–8.1)
SODIUM SERPL-SCNC: 136 MMOL/L (ref 135–146)
TRIGL SERPL-MCNC: 323 MG/DL

## 2018-10-26 ENCOUNTER — OFFICE VISIT (OUTPATIENT)
Dept: FAMILY MEDICINE | Facility: CLINIC | Age: 63
End: 2018-10-26
Payer: COMMERCIAL

## 2018-10-26 VITALS
TEMPERATURE: 98 F | WEIGHT: 196.63 LBS | DIASTOLIC BLOOD PRESSURE: 86 MMHG | HEART RATE: 97 BPM | OXYGEN SATURATION: 97 % | SYSTOLIC BLOOD PRESSURE: 128 MMHG | HEIGHT: 66 IN | BODY MASS INDEX: 31.6 KG/M2

## 2018-10-26 DIAGNOSIS — R74.8 ELEVATED LIVER ENZYMES: ICD-10-CM

## 2018-10-26 DIAGNOSIS — F41.9 ANXIETY: ICD-10-CM

## 2018-10-26 DIAGNOSIS — E78.5 HYPERLIPIDEMIA ASSOCIATED WITH TYPE 2 DIABETES MELLITUS: ICD-10-CM

## 2018-10-26 DIAGNOSIS — I10 ESSENTIAL HYPERTENSION: ICD-10-CM

## 2018-10-26 DIAGNOSIS — E11.9 TYPE 2 DIABETES MELLITUS WITHOUT COMPLICATION, WITHOUT LONG-TERM CURRENT USE OF INSULIN: Primary | ICD-10-CM

## 2018-10-26 DIAGNOSIS — E11.69 HYPERLIPIDEMIA ASSOCIATED WITH TYPE 2 DIABETES MELLITUS: ICD-10-CM

## 2018-10-26 PROCEDURE — 99214 OFFICE O/P EST MOD 30 MIN: CPT | Mod: S$GLB,,, | Performed by: NURSE PRACTITIONER

## 2018-10-26 PROCEDURE — 99999 PR PBB SHADOW E&M-EST. PATIENT-LVL IV: CPT | Mod: PBBFAC,,, | Performed by: NURSE PRACTITIONER

## 2018-10-26 PROCEDURE — 3045F PR MOST RECENT HEMOGLOBIN A1C LEVEL 7.0-9.0%: CPT | Mod: CPTII,S$GLB,, | Performed by: NURSE PRACTITIONER

## 2018-10-26 PROCEDURE — 3074F SYST BP LT 130 MM HG: CPT | Mod: CPTII,S$GLB,, | Performed by: NURSE PRACTITIONER

## 2018-10-26 PROCEDURE — 3079F DIAST BP 80-89 MM HG: CPT | Mod: CPTII,S$GLB,, | Performed by: NURSE PRACTITIONER

## 2018-10-26 PROCEDURE — 3008F BODY MASS INDEX DOCD: CPT | Mod: CPTII,S$GLB,, | Performed by: NURSE PRACTITIONER

## 2018-10-26 RX ORDER — METFORMIN HYDROCHLORIDE 1000 MG/1
1000 TABLET ORAL 2 TIMES DAILY WITH MEALS
Qty: 180 TABLET | Refills: 0 | Status: SHIPPED | OUTPATIENT
Start: 2018-10-26 | End: 2019-02-20 | Stop reason: SDUPTHER

## 2018-10-26 RX ORDER — CLONAZEPAM 1 MG/1
1 TABLET ORAL NIGHTLY PRN
Qty: 30 TABLET | Refills: 2 | Status: SHIPPED | OUTPATIENT
Start: 2018-10-26 | End: 2019-03-15 | Stop reason: SDUPTHER

## 2018-10-26 RX ORDER — ROSUVASTATIN CALCIUM 40 MG/1
40 TABLET, COATED ORAL DAILY
Qty: 90 TABLET | Refills: 0 | Status: SHIPPED | OUTPATIENT
Start: 2018-10-26 | End: 2019-03-15 | Stop reason: SDUPTHER

## 2018-10-26 RX ORDER — LISINOPRIL 40 MG/1
40 TABLET ORAL DAILY
Qty: 90 TABLET | Refills: 0 | Status: SHIPPED | OUTPATIENT
Start: 2018-10-26 | End: 2019-02-20 | Stop reason: SDUPTHER

## 2018-10-26 NOTE — PROGRESS NOTES
"Subjective:       Patient ID: Bartolome Esquivel Jr. is a 63 y.o. male.    Chief Complaint: Follow-up (lab results)    Patient is a 63 year old white male with Type 2 Diabetes, Hypertension, Hyperlipidemia, Anxiety, chronic mildly elevated liver enzymes, and chronic arthralgia that is here today for 3 month follow up with fasting lab results.     Patient has Type 2 Diabetes that is uncontrolled on Metformin 1000 mg twice daily.  with hemoglobin A1c is 7.5%.  Advised to get yearly diabetic eye exam.  Will adjust medications.  See plan and care below.     Patient has Hypertension and currently taking Lisinopril 40 mg daily.  /86 (BP Location: Left arm, Patient Position: Sitting, BP Method: Medium (Manual))   Pulse 97   Temp 98 °F (36.7 °C) (Oral)   Ht 5' 6" (1.676 m)   Wt 89.2 kg (196 lb 10.4 oz)   SpO2 97%   BMI 31.74 kg/m²        Patient has Hyperlipidemia. Patient to tolerate fenofibrate in the past due to acid reflux.  Hears currently taking Crestor 40 mg daily.  Total cholesterol and LDL are controlled on present medication.  However his triglycerides remain elevated in the 300s.  Advised patient to try get back on fish oil supplement and change diet to lower triglycerides.      Patient has anxiety and takes Clonazepam only as needed at night.    Patient has chronic right shoulder pain that gets relief with Meloxicam as needed.     Patient has mildly elevated ALT since June 2017 that we are monitoring every 6 months.  Suspect fatty liver.  His liver enzymes have gone up this visit but his triglycerides and glucose have also gone up supporting suspected fatty liver.  Advised patient I am adjusting medicines and given him 3 more months to make changes.  If liver enzymes do not go back down, we will need a liver ultrasound for further evaluation to confirm suspected fatty liver          Component      Latest Ref Rng & Units 10/24/2018 6/20/2018   Glucose      65 - 99 mg/dL 188 (H) 176 (H)   BUN, " Bld      7 - 25 mg/dL 9 14   Creatinine      0.70 - 1.25 mg/dL 0.76 0.83   eGFR if non       > OR = 60 mL/min/1.73m2 98 95   eGFR if       > OR = 60 mL/min/1.73m2 114 110   BUN/Creatinine Ratio      6 - 22 (calc) NOT APPLICABLE NOT APPLICABLE   Sodium      135 - 146 mmol/L 136 135   Potassium      3.5 - 5.3 mmol/L 5.1 5.1   Chloride      98 - 110 mmol/L 97 (L) 96 (L)   CO2      20 - 32 mmol/L 28 27   Calcium      8.6 - 10.3 mg/dL 10.1 10.2   Total Protein      6.1 - 8.1 g/dL 7.0 7.2   Albumin      3.6 - 5.1 g/dL 4.8 4.9   Globulin, Total      1.9 - 3.7 g/dL (calc) 2.2 2.3   Albumin/Globulin Ratio      1.0 - 2.5 (calc) 2.2 2.1   Total Bilirubin      0.2 - 1.2 mg/dL 0.5 0.7   Alkaline Phosphatase      40 - 115 U/L 75 65   AST      10 - 35 U/L 41 (H) 27   ALT      9 - 46 U/L 78 (H) 49 (H)   Cholesterol      <200 mg/dL 143 172   HDL      >40 mg/dL 43 48   Triglycerides      <150 mg/dL 323 (H) 340 (H)   LDL Cholesterol      mg/dL (calc) 61 83   HDL/Chol Ratio      <5.0 (calc) 3.3 3.6   Non HDL Chol. (LDL+VLDL)      <130 mg/dL (calc) 100 124   A1c      <5.7 % of total Hgb  7.4 (H)   Hemoglobin A1C      <5.7 % of total Hgb 7.5 (H)      Component      Latest Ref Rng & Units 2/21/2018   Glucose      65 - 99 mg/dL 117 (H)   BUN, Bld      7 - 25 mg/dL 10   Creatinine      0.70 - 1.25 mg/dL 0.80   eGFR if non       > OR = 60 mL/min/1.73m2 96   eGFR if       > OR = 60 mL/min/1.73m2 112   BUN/Creatinine Ratio      6 - 22 (calc) NOT APPLICABLE   Sodium      135 - 146 mmol/L 131 (L)   Potassium      3.5 - 5.3 mmol/L 4.9   Chloride      98 - 110 mmol/L 93 (L)   CO2      20 - 32 mmol/L 25   Calcium      8.6 - 10.3 mg/dL 9.7   Total Protein      6.1 - 8.1 g/dL 7.3   Albumin      3.6 - 5.1 g/dL 4.6   Globulin, Total      1.9 - 3.7 g/dL (calc) 2.7   Albumin/Globulin Ratio      1.0 - 2.5 (calc) 1.7   Total Bilirubin      0.2 - 1.2 mg/dL 0.8   Alkaline Phosphatase      40 -  115 U/L 64   AST      10 - 35 U/L 26   ALT      9 - 46 U/L 48 (H)   Cholesterol      <200 mg/dL 273 (H)   HDL      >40 mg/dL 43   Triglycerides      <150 mg/dL 645 (H)   LDL Cholesterol      mg/dL (calc)    HDL/Chol Ratio      <5.0 (calc) 6.3 (H)   Non HDL Chol. (LDL+VLDL)      <130 mg/dL (calc) 230 (H)   A1c      <5.7 % of total Hgb 6.6 (H)   Hemoglobin A1C      <5.7 % of total Hgb        Current Outpatient Medications   Medication Sig Dispense Refill    aspirin (ECOTRIN) 81 MG EC tablet Take 1 tablet (81 mg total) by mouth once daily. 90 tablet 3    clonazePAM (KLONOPIN) 1 MG tablet Take 1 tablet (1 mg total) by mouth nightly as needed. 30 tablet 2    cyanocobalamin (VITAMIN B-12) 100 MCG tablet Take 100 mcg by mouth once daily.      lisinopril (PRINIVIL,ZESTRIL) 40 MG tablet Take 1 tablet (40 mg total) by mouth once daily. 90 tablet 0    meloxicam (MOBIC) 7.5 MG tablet Take 1 tablet (7.5 mg total) by mouth once daily. 90 tablet 0    metFORMIN (GLUCOPHAGE) 1000 MG tablet Take 1 tablet (1,000 mg total) by mouth 2 (two) times daily with meals. 180 tablet 0    rosuvastatin (CRESTOR) 40 MG Tab Take 1 tablet (40 mg total) by mouth once daily. 90 tablet 1    vitamin D 1000 units Tab Take 4,000 Units by mouth once daily.       No current facility-administered medications for this visit.        Past Medical History:   Diagnosis Date    Anxiety     DM (diabetes mellitus) 11/4/2014    Hyperlipidemia     Hypertension     Impaired fasting glucose     Metabolic syndrome        Past Surgical History:   Procedure Laterality Date    COLONOSCOPY  2007    + polyp - tubular adenoma - Dr. Hernandez       Family History   Problem Relation Age of Onset    Cancer Mother         Lung Cancer and Colon Cancer    Heart disease Father 49        MI    Diabetes Father     No Known Problems Daughter     No Known Problems Son     No Known Problems Son        Social History     Socioeconomic History    Marital status:       Spouse name: None    Number of children: None    Years of education: None    Highest education level: None   Social Needs    Financial resource strain: None    Food insecurity - worry: None    Food insecurity - inability: None    Transportation needs - medical: None    Transportation needs - non-medical: None   Occupational History     Employer: [employer]   Tobacco Use    Smoking status: Former Smoker     Packs/day: 1.00     Years: 15.00     Pack years: 15.00     Types: Cigarettes     Last attempt to quit: 1994     Years since quittin.8    Tobacco comment: Quit in    Substance and Sexual Activity    Alcohol use: Yes     Comment: Reports 1 to 2 beers per day on weekdays and 10 beers per day on weekend days    Drug use: No    Sexual activity: None   Other Topics Concern    None   Social History Narrative    None       Review of Systems   Constitutional: Negative for activity change, appetite change, fatigue, fever and unexpected weight change.   HENT: Negative for congestion, ear pain, mouth sores, nosebleeds, postnasal drip, rhinorrhea, sinus pressure, sneezing, sore throat, trouble swallowing and voice change.    Eyes: Negative.    Respiratory: Negative for cough, chest tightness and shortness of breath.    Cardiovascular: Negative for chest pain, palpitations and leg swelling.   Gastrointestinal: Negative.  Negative for abdominal pain, blood in stool, constipation, diarrhea, nausea and vomiting.   Endocrine: Negative.    Genitourinary: Negative for difficulty urinating, dysuria, flank pain, hematuria and urgency.   Musculoskeletal: Negative for arthralgias, back pain, gait problem, joint swelling, myalgias and neck pain.   Skin: Negative for color change, rash and wound.   Allergic/Immunologic: Negative for immunocompromised state.   Neurological: Negative for dizziness, tremors, seizures, syncope, speech difficulty and headaches.   Hematological: Negative for adenopathy. Does not  "bruise/bleed easily.   Psychiatric/Behavioral: Negative for behavioral problems, dysphoric mood, sleep disturbance and suicidal ideas. The patient is not nervous/anxious.          Objective:     Vitals:    10/26/18 1026   BP: 128/86   BP Location: Left arm   Patient Position: Sitting   BP Method: Medium (Manual)   Pulse: 97   Temp: 98 °F (36.7 °C)   TempSrc: Oral   SpO2: 97%   Weight: 89.2 kg (196 lb 10.4 oz)   Height: 5' 6" (1.676 m)          Physical Exam   Constitutional: He is oriented to person, place, and time. He appears well-developed and well-nourished. No distress.   + obesity. Body mass index is 31.85 kg/m².       HENT:   Head: Normocephalic and atraumatic.   Right Ear: External ear normal.   Left Ear: External ear normal.   Nose: Nose normal.   Mouth/Throat: Oropharynx is clear and moist. No oropharyngeal exudate.   Eyes: Conjunctivae and EOM are normal. Pupils are equal, round, and reactive to light. Right eye exhibits no discharge. Left eye exhibits no discharge. No scleral icterus.   Neck: Normal range of motion. Neck supple. No JVD present. No tracheal deviation present. No thyromegaly present.   Cardiovascular: Normal rate and regular rhythm.   No murmur heard.  Pulses:       Dorsalis pedis pulses are 2+ on the right side, and 2+ on the left side.        Posterior tibial pulses are 2+ on the right side, and 2+ on the left side.   History premature contractions - monitored by cardiology Dr. Baker   Pulmonary/Chest: Effort normal and breath sounds normal. No stridor. No respiratory distress. He has no wheezes. He has no rales.   Abdominal: Soft. Bowel sounds are normal. He exhibits no distension. There is no tenderness. There is no rebound and no guarding. A hernia is present.       Patient with ? Ventral hernia versus diastasis recti; nontender, only present with abdominal tension   Musculoskeletal: Normal range of motion. He exhibits no edema.        Right foot: There is normal range of motion and " no deformity.        Left foot: There is normal range of motion and no deformity.   Feet:   Right Foot:   Protective Sensation: 8 sites tested. 8 sites sensed.   Skin Integrity: Negative for ulcer or skin breakdown.   Left Foot:   Protective Sensation: 8 sites tested. 8 sites sensed.   Skin Integrity: Negative for ulcer or skin breakdown.   Lymphadenopathy:     He has no cervical adenopathy.   Neurological: He is alert and oriented to person, place, and time. Coordination normal.   Skin: Skin is warm and dry. No rash noted. He is not diaphoretic.   Psychiatric: He has a normal mood and affect. His behavior is normal.         Assessment:         ICD-10-CM ICD-9-CM   1. Type 2 diabetes mellitus without complication, without long-term current use of insulin E11.9 250.00   2. Hyperlipidemia associated with type 2 diabetes mellitus E11.69 250.80    E78.5 272.4   3. Essential hypertension I10 401.9   4. Anxiety F41.9 300.00   5. Elevated liver enzymes R74.8 790.5       Plan:       Type 2 diabetes mellitus without complication, without long-term current use of insulin  -  start Januvia 100 mg every morning.  Continue metformin a 1000 mg twice daily.  Strict lifestyle modifications and recheck in 3 months.  Patient will go to [facility] before next visit  -     SITagliptin (JANUVIA) 100 MG Tab; Take 1 tablet (100 mg total) by mouth once daily.  Dispense: 90 tablet; Refill: 0  -     metFORMIN (GLUCOPHAGE) 1000 MG tablet; Take 1 tablet (1,000 mg total) by mouth 2 (two) times daily with meals.  Dispense: 180 tablet; Refill: 0  -     Comprehensive metabolic panel; Future; Expected date: 01/07/2019  -     Hemoglobin A1c; Future; Expected date: 01/07/2019    Hyperlipidemia associated with type 2 diabetes mellitus  -  continue rosuvastatin 40 mg daily.  Add over-the-counter fish oil supplement.  Strict lifestyle modifications.  Recheck in 3 months  -     rosuvastatin (CRESTOR) 40 MG Tab; Take 1 tablet (40 mg total) by mouth once  daily.  Dispense: 90 tablet; Refill: 0  -     Lipid panel; Future; Expected date: 01/07/2019    Essential hypertension  -  continue lisinopril at present dose and recheck in 3 months  -     lisinopril (PRINIVIL,ZESTRIL) 40 MG tablet; Take 1 tablet (40 mg total) by mouth once daily.  Dispense: 90 tablet; Refill: 0    Anxiety  -  may continue clonazepam only at night and recheck in 3 months  -     clonazePAM (KLONOPIN) 1 MG tablet; Take 1 tablet (1 mg total) by mouth nightly as needed.  Dispense: 30 tablet; Refill: 2    Elevated liver enzymes  -  avoid all alcohol and Tylenol.  Strict lifestyle modifications.  Recheck in 3 months.  If still elevated, will get liver ultrasound      Follow-up in about 3 months (around 1/26/2019) for fasting labs and office visit.        Medication List           Accurate as of 10/26/18  1:18 PM. If you have any questions, ask your nurse or doctor.               START taking these medications    SITagliptin 100 MG Tab  Commonly known as:  JANUVIA  Take 1 tablet (100 mg total) by mouth once daily.  Started by:  Susie Carranza NP        CONTINUE taking these medications    aspirin 81 MG EC tablet  Commonly known as:  ECOTRIN  Take 1 tablet (81 mg total) by mouth once daily.     clonazePAM 1 MG tablet  Commonly known as:  KLONOPIN  Take 1 tablet (1 mg total) by mouth nightly as needed.     lisinopril 40 MG tablet  Commonly known as:  PRINIVIL,ZESTRIL  Take 1 tablet (40 mg total) by mouth once daily.     meloxicam 7.5 MG tablet  Commonly known as:  MOBIC  Take 1 tablet (7.5 mg total) by mouth once daily.     metFORMIN 1000 MG tablet  Commonly known as:  GLUCOPHAGE  Take 1 tablet (1,000 mg total) by mouth 2 (two) times daily with meals.     rosuvastatin 40 MG Tab  Commonly known as:  CRESTOR  Take 1 tablet (40 mg total) by mouth once daily.     VITAMIN B-12 100 MCG tablet  Generic drug:  cyanocobalamin     vitamin D 1000 units Tab  Commonly known as:  VITAMIN D3           Where to Get Your  Medications      These medications were sent to [facility] - [place], [place] - [address]  [address] [address]    Phone:  [phone]   · rosuvastatin 40 MG Tab  · SITagliptin 100 MG Tab     These medications were sent to [id number] - [place] - [address] [facility] [address], [address]  [address] [facility] [address], [address], [place] [address]    Phone:  [phone]   · lisinopril 40 MG tablet  · metFORMIN 1000 MG tablet     You can get these medications from any pharmacy    Bring a paper prescription for each of these medications  · clonazePAM 1 MG tablet

## 2019-02-20 ENCOUNTER — TELEPHONE (OUTPATIENT)
Dept: FAMILY MEDICINE | Facility: CLINIC | Age: 64
End: 2019-02-20

## 2019-02-20 DIAGNOSIS — E11.9 TYPE 2 DIABETES MELLITUS WITHOUT COMPLICATION, WITHOUT LONG-TERM CURRENT USE OF INSULIN: ICD-10-CM

## 2019-02-20 DIAGNOSIS — I10 ESSENTIAL HYPERTENSION: ICD-10-CM

## 2019-02-20 RX ORDER — METFORMIN HYDROCHLORIDE 1000 MG/1
1000 TABLET ORAL 2 TIMES DAILY WITH MEALS
Qty: 60 TABLET | Refills: 0 | Status: SHIPPED | OUTPATIENT
Start: 2019-02-20 | End: 2019-03-15 | Stop reason: SDUPTHER

## 2019-02-20 RX ORDER — LISINOPRIL 40 MG/1
40 TABLET ORAL DAILY
Qty: 30 TABLET | Refills: 0 | Status: SHIPPED | OUTPATIENT
Start: 2019-02-20 | End: 2019-03-15 | Stop reason: SDUPTHER

## 2019-02-20 NOTE — TELEPHONE ENCOUNTER
Left message to call office. Patient has appt in April but was due back in Jan can you orders labs

## 2019-02-20 NOTE — TELEPHONE ENCOUNTER
Spoke with patient on phone.  He states that the Januvia got lost in the mail so he did NOT start taking the medication.  He states he went back to taking the Fenofibrate at night time that he had stopped taking due to acid reflux and with taking that medication at night, he is able to tolerate without side effects.      I advised patient that he SHOULD NEVER stop taking a medication or do not start taking a medication that I advised without first discussing with me as those 2 medications are NOT for the same thing.  Januvia is for Diabetes and Febofibrate is for triglycerides - NOT THE SAME but since he is now due for repeat labs - lets get labs and follow up visit to re-assess to determine what medications are needed.  Patient does report he lost 25 pounds so maybe he does not need the Januvia with recent weight loss.    Advised patient to go get fasting labs at [facility] and make appointment with me in next couple weeks for results.  Patient verbalizes understanding. Patient also made aware that Clonazepam can not be filled until appt.    Patient states that he only needed the Lisinopril and Metformin filled now so a 30 day supply was [facility].

## 2019-02-20 NOTE — TELEPHONE ENCOUNTER
----- Message from Ernestina Cherry sent at 2/20/2019  9:46 AM CST -----  Patient came in regarding needing refills ,metFORMIN (GLUCOPHAGE) 1000 MG ,lisinopril (PRINIVIL,ZESTRIL) 40 MG ,clonazePAM (KLONOPIN) 1 MG , Please call him about this matter #[phone]. Thanks

## 2019-02-20 NOTE — TELEPHONE ENCOUNTER
Advise patient that he is OVERDUE for fasting labs and visit since January because in October his Diabetes was not controlled and we had to adjust medications.  I did fill his Lisinopril 40 mg daily, Metformin 1000 mg twice daily, Januvia 100 mg daily and Rosuvastatin 40 mg daily as these should be the medications that he is running out of and needs to take daily - I sent in a 1 month supply.  Until the Diabetes is controlled - must follow every 3 months.  The Clonazepam is a controlled substance and can not be filled again until he has a documented visit.    Patient has labs done at [facility] and orders have already been sent since last visit in October 2018 to have done in January. I printed the orders if he would like to  a paper copy to bring to [facility].

## 2019-03-13 ENCOUNTER — TELEPHONE (OUTPATIENT)
Dept: FAMILY MEDICINE | Facility: CLINIC | Age: 64
End: 2019-03-13

## 2019-03-13 NOTE — TELEPHONE ENCOUNTER
----- Message from Shima Plummer sent at 3/13/2019  2:20 PM CDT -----  No. [phone]    Patient returned your call.

## 2019-03-14 LAB
ALBUMIN SERPL-MCNC: 4.7 G/DL (ref 3.6–5.1)
ALBUMIN/GLOB SERPL: 2.1 (CALC) (ref 1–2.5)
ALP SERPL-CCNC: 70 U/L (ref 40–115)
ALT SERPL-CCNC: 46 U/L (ref 9–46)
AST SERPL-CCNC: 38 U/L (ref 10–35)
BILIRUB SERPL-MCNC: 0.6 MG/DL (ref 0.2–1.2)
BUN SERPL-MCNC: 9 MG/DL (ref 7–25)
BUN/CREAT SERPL: 14 (CALC) (ref 6–22)
CALCIUM SERPL-MCNC: 10.1 MG/DL (ref 8.6–10.3)
CHLORIDE SERPL-SCNC: 95 MMOL/L (ref 98–110)
CHOLEST SERPL-MCNC: 129 MG/DL
CHOLEST/HDLC SERPL: 2.2 (CALC)
CO2 SERPL-SCNC: 23 MMOL/L (ref 20–32)
CREAT SERPL-MCNC: 0.64 MG/DL (ref 0.7–1.25)
GFRSERPLBLD MDRD-ARVRAT: 105 ML/MIN/1.73M2
GLOBULIN SER CALC-MCNC: 2.2 G/DL (CALC) (ref 1.9–3.7)
GLUCOSE SERPL-MCNC: 140 MG/DL (ref 65–99)
HBA1C MFR BLD: 6.3 % OF TOTAL HGB
HDLC SERPL-MCNC: 60 MG/DL
LDLC SERPL CALC-MCNC: 52 MG/DL (CALC)
NONHDLC SERPL-MCNC: 69 MG/DL (CALC)
POTASSIUM SERPL-SCNC: 5.3 MMOL/L (ref 3.5–5.3)
PROT SERPL-MCNC: 6.9 G/DL (ref 6.1–8.1)
SODIUM SERPL-SCNC: 127 MMOL/L (ref 135–146)
TRIGL SERPL-MCNC: 87 MG/DL

## 2019-03-15 ENCOUNTER — CLINICAL SUPPORT (OUTPATIENT)
Dept: FAMILY MEDICINE | Facility: CLINIC | Age: 64
End: 2019-03-15
Attending: NURSE PRACTITIONER
Payer: COMMERCIAL

## 2019-03-15 ENCOUNTER — OFFICE VISIT (OUTPATIENT)
Dept: FAMILY MEDICINE | Facility: CLINIC | Age: 64
End: 2019-03-15
Payer: COMMERCIAL

## 2019-03-15 VITALS
DIASTOLIC BLOOD PRESSURE: 84 MMHG | WEIGHT: 183 LBS | BODY MASS INDEX: 29.41 KG/M2 | HEART RATE: 67 BPM | SYSTOLIC BLOOD PRESSURE: 130 MMHG | HEIGHT: 66 IN | TEMPERATURE: 98 F | OXYGEN SATURATION: 99 % | RESPIRATION RATE: 18 BRPM

## 2019-03-15 DIAGNOSIS — E78.5 HYPERLIPIDEMIA ASSOCIATED WITH TYPE 2 DIABETES MELLITUS: ICD-10-CM

## 2019-03-15 DIAGNOSIS — E11.9 TYPE 2 DIABETES MELLITUS WITHOUT COMPLICATION, WITHOUT LONG-TERM CURRENT USE OF INSULIN: Primary | ICD-10-CM

## 2019-03-15 DIAGNOSIS — F41.9 ANXIETY: ICD-10-CM

## 2019-03-15 DIAGNOSIS — I10 ESSENTIAL HYPERTENSION: ICD-10-CM

## 2019-03-15 DIAGNOSIS — Z13.29 THYROID DISORDER SCREEN: ICD-10-CM

## 2019-03-15 DIAGNOSIS — M25.50 ARTHRALGIA, UNSPECIFIED JOINT: ICD-10-CM

## 2019-03-15 DIAGNOSIS — E11.9 TYPE 2 DIABETES MELLITUS WITHOUT COMPLICATION, WITHOUT LONG-TERM CURRENT USE OF INSULIN: ICD-10-CM

## 2019-03-15 DIAGNOSIS — Z12.5 PROSTATE CANCER SCREENING: ICD-10-CM

## 2019-03-15 DIAGNOSIS — E11.69 HYPERLIPIDEMIA ASSOCIATED WITH TYPE 2 DIABETES MELLITUS: ICD-10-CM

## 2019-03-15 DIAGNOSIS — Z13.0 SCREENING FOR DEFICIENCY ANEMIA: ICD-10-CM

## 2019-03-15 DIAGNOSIS — E87.1 HYPONATREMIA: ICD-10-CM

## 2019-03-15 DIAGNOSIS — Z12.11 COLON CANCER SCREENING: ICD-10-CM

## 2019-03-15 PROCEDURE — 3008F BODY MASS INDEX DOCD: CPT | Mod: CPTII,S$GLB,, | Performed by: NURSE PRACTITIONER

## 2019-03-15 PROCEDURE — 99999 PR PBB SHADOW E&M-EST. PATIENT-LVL II: ICD-10-PCS | Mod: PBBFAC,,,

## 2019-03-15 PROCEDURE — 3079F PR MOST RECENT DIASTOLIC BLOOD PRESSURE 80-89 MM HG: ICD-10-PCS | Mod: CPTII,S$GLB,, | Performed by: NURSE PRACTITIONER

## 2019-03-15 PROCEDURE — 3075F PR MOST RECENT SYSTOLIC BLOOD PRESS GE 130-139MM HG: ICD-10-PCS | Mod: CPTII,S$GLB,, | Performed by: NURSE PRACTITIONER

## 2019-03-15 PROCEDURE — 3044F PR MOST RECENT HEMOGLOBIN A1C LEVEL <7.0%: ICD-10-PCS | Mod: CPTII,S$GLB,, | Performed by: NURSE PRACTITIONER

## 2019-03-15 PROCEDURE — 99214 PR OFFICE/OUTPT VISIT, EST, LEVL IV, 30-39 MIN: ICD-10-PCS | Mod: S$GLB,,, | Performed by: NURSE PRACTITIONER

## 2019-03-15 PROCEDURE — 99999 PR PBB SHADOW E&M-EST. PATIENT-LVL IV: CPT | Mod: PBBFAC,,, | Performed by: NURSE PRACTITIONER

## 2019-03-15 PROCEDURE — 99999 PR PBB SHADOW E&M-EST. PATIENT-LVL IV: ICD-10-PCS | Mod: PBBFAC,,, | Performed by: NURSE PRACTITIONER

## 2019-03-15 PROCEDURE — 99999 PR PBB SHADOW E&M-EST. PATIENT-LVL II: CPT | Mod: PBBFAC,,,

## 2019-03-15 PROCEDURE — 92250 DIABETIC EYE SCREENING PHOTO: ICD-10-PCS | Mod: S$GLB,,, | Performed by: OPHTHALMOLOGY

## 2019-03-15 PROCEDURE — 3075F SYST BP GE 130 - 139MM HG: CPT | Mod: CPTII,S$GLB,, | Performed by: NURSE PRACTITIONER

## 2019-03-15 PROCEDURE — 92250 FUNDUS PHOTOGRAPHY W/I&R: CPT | Mod: S$GLB,,, | Performed by: OPHTHALMOLOGY

## 2019-03-15 PROCEDURE — 3079F DIAST BP 80-89 MM HG: CPT | Mod: CPTII,S$GLB,, | Performed by: NURSE PRACTITIONER

## 2019-03-15 PROCEDURE — 3044F HG A1C LEVEL LT 7.0%: CPT | Mod: CPTII,S$GLB,, | Performed by: NURSE PRACTITIONER

## 2019-03-15 PROCEDURE — 3008F PR BODY MASS INDEX (BMI) DOCUMENTED: ICD-10-PCS | Mod: CPTII,S$GLB,, | Performed by: NURSE PRACTITIONER

## 2019-03-15 PROCEDURE — 99214 OFFICE O/P EST MOD 30 MIN: CPT | Mod: S$GLB,,, | Performed by: NURSE PRACTITIONER

## 2019-03-15 RX ORDER — METFORMIN HYDROCHLORIDE 1000 MG/1
1000 TABLET ORAL 2 TIMES DAILY WITH MEALS
Qty: 60 TABLET | Refills: 0 | Status: SHIPPED | OUTPATIENT
Start: 2019-03-15 | End: 2019-04-10 | Stop reason: SDUPTHER

## 2019-03-15 RX ORDER — ETODOLAC 400 MG/1
400 TABLET, FILM COATED ORAL 2 TIMES DAILY
Qty: 60 TABLET | Refills: 2 | Status: SHIPPED | OUTPATIENT
Start: 2019-03-15 | End: 2019-09-18

## 2019-03-15 RX ORDER — LISINOPRIL 40 MG/1
40 TABLET ORAL DAILY
Qty: 90 TABLET | Refills: 1 | Status: SHIPPED | OUTPATIENT
Start: 2019-03-15 | End: 2019-09-07 | Stop reason: SDUPTHER

## 2019-03-15 RX ORDER — ROSUVASTATIN CALCIUM 40 MG/1
40 TABLET, COATED ORAL DAILY
Qty: 90 TABLET | Refills: 1 | Status: SHIPPED | OUTPATIENT
Start: 2019-03-15 | End: 2019-09-18 | Stop reason: SDUPTHER

## 2019-03-15 RX ORDER — CLONAZEPAM 1 MG/1
1 TABLET ORAL NIGHTLY PRN
Qty: 30 TABLET | Refills: 2 | Status: SHIPPED | OUTPATIENT
Start: 2019-03-15 | End: 2019-09-18 | Stop reason: SDUPTHER

## 2019-03-15 RX ORDER — FENOFIBRATE 160 MG/1
160 TABLET ORAL NIGHTLY
COMMUNITY
End: 2019-03-15 | Stop reason: SDUPTHER

## 2019-03-15 RX ORDER — FENOFIBRATE 160 MG/1
160 TABLET ORAL NIGHTLY
Qty: 90 TABLET | Refills: 1 | Status: SHIPPED | OUTPATIENT
Start: 2019-03-15 | End: 2019-07-10 | Stop reason: SDUPTHER

## 2019-03-15 NOTE — PROGRESS NOTES
"Subjective:       Patient ID: Bartolome Esquivel Jr. is a 64 y.o. male.    Chief Complaint: Follow-up (lab results) and Medication Refill    Patient is a 64 year old white male with Type 2 Diabetes, Hypertension, Hyperlipidemia, Anxiety, chronic mildly elevated liver enzymes, and chronic arthralgia that is here today for 3 month follow up with fasting lab results.     Patient has Type 2 Diabetes that was uncontrolled on Metformin 1000 mg twice daily.  with hemoglobin A1c 7.5% in October 2019. I had added on Januvia 100 mg daily but patient reports he did not take the medication because it had gotten lost in the mail and once he received it, he had already worked on weight loss and started himself back on the Fenofibrate that he had stopped taking due to acid reflux and has now lost 25 pounds and tolerating all medications.  He only notified me of not starting the Januvia and getting back on the Fenofibrate in Feb. 2019 so I had advised patient that since he had weight loss, he could wait for fasting labs before making any other medication adjustments.  Patient's fasting blood sugar is now  140 with HgbA1C of 6.3% on Metformin only.    Patient has Hypertension and currently taking Lisinopril 40 mg daily.  /84   Pulse 67   Temp 97.7 °F (36.5 °C) (Oral)   Resp 18   Ht 5' 6" (1.676 m)   Wt 83 kg (183 lb)   SpO2 99%   BMI 29.54 kg/m²      Patient has Hyperlipidemia. Patient had stopped taking the Fenofibrate due to acid reflux but triglcyerides had remained high on just Rosuvastatin 40 mg daily.  Patient also had elevated liver enzymes.  Patient reports that he ended up starting back on Fenofibrate at last visit taking at night and lost 25 pounds and no longer has the acid reflux.  Cholesterol levels are now WNL and triglycerides are much improved.      Patient has anxiety and takes Clonazepam only as needed at night.     Patient has chronic right shoulder pain that gets relief with Meloxicam as needed " but reports he is now changing his roof and has back pains.  Asking for something other than the meloxicam for as needed.     Patient has mildly elevated ALT since June 2017 that we are monitoring every 6 months.  Suspect fatty liver.  His liver enzymes have gone up in October 2018 but his triglycerides and glucose had also gone up supporting suspected fatty liver.  Advised patient I am adjusting medicines and given him 3 more months to make changes.  If liver enzymes do not go back down, we will need a liver ultrasound for further evaluation to confirm suspected fatty liver.  IT has now been 3 months and liver enzymes have improved.           Component      Latest Ref Rng & Units 3/13/2019 10/24/2018 6/20/2018   Glucose      65 - 99 mg/dL 140 (H) 188 (H) 176 (H)   BUN, Bld      7 - 25 mg/dL 9 9 14   Creatinine      0.70 - 1.25 mg/dL 0.64 (L) 0.76 0.83   eGFR if non       > OR = 60 mL/min/1.73m2 105 98 95   eGFR if       > OR = 60 mL/min/1.73m2 122 114 110   BUN/Creatinine Ratio      6 - 22 (calc) 14 NOT APPLICABLE NOT APPLICABLE   Sodium      135 - 146 mmol/L 127 (L) 136 135   Potassium      3.5 - 5.3 mmol/L 5.3 5.1 5.1   Chloride      98 - 110 mmol/L 95 (L) 97 (L) 96 (L)   CO2      20 - 32 mmol/L 23 28 27   Calcium      8.6 - 10.3 mg/dL 10.1 10.1 10.2   Total Protein      6.1 - 8.1 g/dL 6.9 7.0 7.2   Albumin      3.6 - 5.1 g/dL 4.7 4.8 4.9   Globulin, Total      1.9 - 3.7 g/dL (calc) 2.2 2.2 2.3   Albumin/Globulin Ratio      1.0 - 2.5 (calc) 2.1 2.2 2.1   Total Bilirubin      0.2 - 1.2 mg/dL 0.6 0.5 0.7   Alkaline Phosphatase      40 - 115 U/L 70 75 65   AST      10 - 35 U/L 38 (H) 41 (H) 27   ALT      9 - 46 U/L 46 78 (H) 49 (H)   Cholesterol      <200 mg/dL 129 143 172   HDL      >40 mg/dL 60 43 48   Triglycerides      <150 mg/dL 87 323 (H) 340 (H)   LDL Cholesterol      mg/dL (calc) 52 61 83   HDL/Chol Ratio      <5.0 (calc) 2.2 3.3 3.6   Non HDL Chol. (LDL+VLDL)      <130 mg/dL  (calc) 69 100 124   A1c      <5.7 % of total Hgb   7.4 (H)   TSH      0.40 - 4.50 mIU/L      Hemoglobin A1C External      <5.7 % of total Hgb 6.3 (H) 7.5 (H)          Current Outpatient Medications   Medication Sig Dispense Refill    aspirin (ECOTRIN) 81 MG EC tablet Take 1 tablet (81 mg total) by mouth once daily. 90 tablet 3    clonazePAM (KLONOPIN) 1 MG tablet Take 1 tablet (1 mg total) by mouth nightly as needed. 30 tablet 2    cyanocobalamin (VITAMIN B-12) 100 MCG tablet Take 100 mcg by mouth once daily.      fenofibrate 160 MG Tab Take 160 mg by mouth nightly.      lisinopril (PRINIVIL,ZESTRIL) 40 MG tablet Take 1 tablet (40 mg total) by mouth once daily. 30 tablet 0    meloxicam (MOBIC) 7.5 MG tablet Take 1 tablet (7.5 mg total) by mouth once daily. 90 tablet 0    metFORMIN (GLUCOPHAGE) 1000 MG tablet Take 1 tablet (1,000 mg total) by mouth 2 (two) times daily with meals. 60 tablet 0    rosuvastatin (CRESTOR) 40 MG Tab Take 1 tablet (40 mg total) by mouth once daily. 90 tablet 0    vitamin D 1000 units Tab Take 4,000 Units by mouth once daily.       No current facility-administered medications for this visit.        Past Medical History:   Diagnosis Date    Anxiety     DM (diabetes mellitus) 11/4/2014    Hyperlipidemia     Hypertension     Impaired fasting glucose     Metabolic syndrome        Past Surgical History:   Procedure Laterality Date    COLONOSCOPY  2007    + polyp - tubular adenoma - Dr. Hernandez       Family History   Problem Relation Age of Onset    Cancer Mother         Lung Cancer and Colon Cancer    Heart disease Father 49        MI    Diabetes Father     No Known Problems Daughter     No Known Problems Son     No Known Problems Son        Social History     Socioeconomic History    Marital status:      Spouse name: None    Number of children: None    Years of education: None    Highest education level: None   Social Needs    Financial resource strain: None     Food insecurity - worry: None    Food insecurity - inability: None    Transportation needs - medical: None    Transportation needs - non-medical: None   Occupational History     Employer: [employer]   Tobacco Use    Smoking status: Former Smoker     Packs/day: 1.00     Years: 15.00     Pack years: 15.00     Types: Cigarettes     Last attempt to quit: 1994     Years since quittin.2    Tobacco comment: Quit in    Substance and Sexual Activity    Alcohol use: Yes     Comment: Reports 1 to 2 beers per day on weekdays and 10 beers per day on weekend days    Drug use: No    Sexual activity: None   Other Topics Concern    None   Social History Narrative    None       Review of Systems   Constitutional: Negative for activity change, appetite change, fatigue, fever and unexpected weight change.   HENT: Negative for congestion, ear pain, mouth sores, nosebleeds, postnasal drip, rhinorrhea, sinus pressure, sneezing, sore throat, trouble swallowing and voice change.    Eyes: Negative.    Respiratory: Negative for cough, chest tightness and shortness of breath.    Cardiovascular: Negative for chest pain, palpitations and leg swelling.   Gastrointestinal: Negative.  Negative for abdominal pain, blood in stool, constipation, diarrhea, nausea and vomiting.   Endocrine: Negative.    Genitourinary: Negative for difficulty urinating, dysuria, flank pain, hematuria and urgency.   Musculoskeletal: Positive for back pain and myalgias. Negative for arthralgias, gait problem, joint swelling and neck pain.   Skin: Negative for color change, rash and wound.   Allergic/Immunologic: Negative for immunocompromised state.   Neurological: Negative for dizziness, tremors, seizures, syncope, speech difficulty and headaches.   Hematological: Negative for adenopathy. Does not bruise/bleed easily.   Psychiatric/Behavioral: Negative for behavioral problems, dysphoric mood, sleep disturbance and suicidal ideas. The patient is not  "nervous/anxious.          Objective:     Vitals:    03/15/19 1356   BP: 130/84   Pulse: 67   Resp: 18   Temp: 97.7 °F (36.5 °C)   TempSrc: Oral   SpO2: 99%   Weight: 83 kg (183 lb)   Height: 5' 6" (1.676 m)          Physical Exam   Constitutional: He is oriented to person, place, and time. He appears well-developed and well-nourished. No distress.   + overweight. Body mass index is 29.54 kg/m².         HENT:   Head: Normocephalic and atraumatic.   Right Ear: External ear normal.   Left Ear: External ear normal.   Nose: Nose normal.   Mouth/Throat: Oropharynx is clear and moist. No oropharyngeal exudate.   Eyes: Conjunctivae and EOM are normal. Pupils are equal, round, and reactive to light. Right eye exhibits no discharge. Left eye exhibits no discharge. No scleral icterus.   Neck: Normal range of motion. Neck supple. No JVD present. No tracheal deviation present. No thyromegaly present.   Cardiovascular: Normal rate and regular rhythm.   No murmur heard.  Pulses:       Dorsalis pedis pulses are 2+ on the right side, and 2+ on the left side.        Posterior tibial pulses are 2+ on the right side, and 2+ on the left side.   History premature contractions - monitored by cardiology Dr. Baker   Pulmonary/Chest: Effort normal and breath sounds normal. No stridor. No respiratory distress. He has no wheezes. He has no rales.   Abdominal: Soft. Bowel sounds are normal. He exhibits no distension. There is no tenderness. There is no rebound and no guarding. A hernia is present.       Patient with ? Ventral hernia versus diastasis recti; nontender, only present with abdominal tension   Musculoskeletal: Normal range of motion. He exhibits no edema.        Right foot: There is normal range of motion and no deformity.        Left foot: There is normal range of motion and no deformity.   Feet:   Right Foot:   Protective Sensation: 8 sites tested. 8 sites sensed.   Skin Integrity: Negative for ulcer or skin breakdown.   Left Foot: "   Protective Sensation: 8 sites tested. 8 sites sensed.   Skin Integrity: Negative for ulcer or skin breakdown.   Lymphadenopathy:     He has no cervical adenopathy.   Neurological: He is alert and oriented to person, place, and time. Coordination normal.   Skin: Skin is warm and dry. No rash noted. He is not diaphoretic.   Psychiatric: He has a normal mood and affect. His behavior is normal.         Assessment:         ICD-10-CM ICD-9-CM   1. Type 2 diabetes mellitus without complication, without long-term current use of insulin E11.9 250.00   2. Essential hypertension I10 401.9   3. Hyperlipidemia associated with type 2 diabetes mellitus E11.69 250.80    E78.5 272.4   4. Colon cancer screening Z12.11 V76.51   5. Hyponatremia E87.1 276.1   6. Anxiety F41.9 300.00   7. Arthralgia, unspecified joint M25.50 719.40   8. Screening for deficiency anemia Z13.0 V78.1   9. Thyroid disorder screen Z13.29 V77.0   10. Prostate cancer screening Z12.5 V76.44       Plan:       Type 2 diabetes mellitus without complication, without long-term current use of insulin  -  Continue Metformin at present dose.  Recheck in 6 months.  -  Diabetic eye photo today as patient has been noncompliant with follow up.  -  Fasting labs 6 months.  -     Diabetic Eye Screening Photo; Future  -     metFORMIN (GLUCOPHAGE) 1000 MG tablet; Take 1 tablet (1,000 mg total) by mouth 2 (two) times daily with meals.  Dispense: 60 tablet; Refill: 0  -     Hemoglobin A1c; Future; Expected date: 03/15/2019  -     Comprehensive metabolic panel; Future; Expected date: 03/15/2019    Essential hypertension  -  Controlled on present medicaiton  -     lisinopril (PRINIVIL,ZESTRIL) 40 MG tablet; Take 1 tablet (40 mg total) by mouth once daily.  Dispense: 90 tablet; Refill: 1    Hyperlipidemia associated with type 2 diabetes mellitus  -  Now controlled on Fenofibrate and Rosuvastatin 40 mg and liver enzymes are improved  -  Recheck in 6 months.  -     fenofibrate 160 MG  Tab; Take 1 tablet (160 mg total) by mouth nightly.  Dispense: 90 tablet; Refill: 1  -     rosuvastatin (CRESTOR) 40 MG Tab; Take 1 tablet (40 mg total) by mouth once daily.  Dispense: 90 tablet; Refill: 1  -     Cancel: Lipid panel; Future; Expected date: 03/15/2019  -     Lipid panel; Future; Expected date: 03/15/2019    Colon cancer screening  -  Agreed to [employer] - asked to turn in in 1 week.  -FitKit was given to patient on 3/15/2019 3:10 PM   --     Fecal Immunochemical Test (iFOBT); Future; Expected date: 03/15/2019    Hyponatremia  -  Decrease beer intake;      Anxiety  -  Take only 1/2 tablet and only as needed.  -     clonazePAM (KLONOPIN) 1 MG tablet; Take 1 tablet (1 mg total) by mouth nightly as needed.  Dispense: 30 tablet; Refill: 2    Arthralgia, unspecified joint  -  Stop the Meloxicam and change to Etodolac twice daily prn.  -     etodolac (LODINE) 400 MG tablet; Take 1 tablet (400 mg total) by mouth 2 (two) times daily.  Dispense: 60 tablet; Refill: 2    Screening for deficiency anemia  -     CBC auto differential; Future; Expected date: 03/15/2019    Thyroid disorder screen  -     TSH; Future; Expected date: 03/15/2019    Prostate cancer screening  --     PSA, Screening; Future; Expected date: 03/15/2019          Follow-up in about 6 months (around 9/15/2019) for fasting labs and WELLNESS EXAM.     Patient's Medications   New Prescriptions    ETODOLAC (LODINE) 400 MG TABLET    Take 1 tablet (400 mg total) by mouth 2 (two) times daily.   Previous Medications    ASPIRIN (ECOTRIN) 81 MG EC TABLET    Take 1 tablet (81 mg total) by mouth once daily.    CYANOCOBALAMIN (VITAMIN B-12) 100 MCG TABLET    Take 100 mcg by mouth once daily.    VITAMIN D 1000 UNITS TAB    Take 4,000 Units by mouth once daily.   Modified Medications    Modified Medication Previous Medication    CLONAZEPAM (KLONOPIN) 1 MG TABLET clonazePAM (KLONOPIN) 1 MG tablet       Take 1 tablet (1 mg total) by mouth nightly as needed.     Take 1 tablet (1 mg total) by mouth nightly as needed.    FENOFIBRATE 160 MG TAB fenofibrate 160 MG Tab       Take 1 tablet (160 mg total) by mouth nightly.    Take 160 mg by mouth nightly.    LISINOPRIL (PRINIVIL,ZESTRIL) 40 MG TABLET lisinopril (PRINIVIL,ZESTRIL) 40 MG tablet       Take 1 tablet (40 mg total) by mouth once daily.    Take 1 tablet (40 mg total) by mouth once daily.    METFORMIN (GLUCOPHAGE) 1000 MG TABLET metFORMIN (GLUCOPHAGE) 1000 MG tablet       Take 1 tablet (1,000 mg total) by mouth 2 (two) times daily with meals.    Take 1 tablet (1,000 mg total) by mouth 2 (two) times daily with meals.    ROSUVASTATIN (CRESTOR) 40 MG TAB rosuvastatin (CRESTOR) 40 MG Tab       Take 1 tablet (40 mg total) by mouth once daily.    Take 1 tablet (40 mg total) by mouth once daily.   Discontinued Medications    MELOXICAM (MOBIC) 7.5 MG TABLET    Take 1 tablet (7.5 mg total) by mouth once daily.    SITAGLIPTIN (JANUVIA) 100 MG TAB    Take 1 tablet (100 mg total) by mouth once daily.

## 2019-03-15 NOTE — PROGRESS NOTES
Bartolome Esquivel  is a 64 y.o. male here for a diabetic eye screening with non-dilated fundus photos per CRISTHIAN STALEY.    Patient cooperative?: Yes  Small pupils?: Yes  Last eye exam:     For exam results, see Encounter Report.

## 2019-03-16 PROCEDURE — 82274 ASSAY TEST FOR BLOOD FECAL: CPT

## 2019-03-20 ENCOUNTER — LAB VISIT (OUTPATIENT)
Dept: LAB | Facility: HOSPITAL | Age: 64
End: 2019-03-20
Payer: COMMERCIAL

## 2019-03-20 DIAGNOSIS — Z12.11 COLON CANCER SCREENING: ICD-10-CM

## 2019-03-20 LAB — HEMOCCULT STL QL IA: NEGATIVE

## 2019-03-22 ENCOUNTER — TELEPHONE (OUTPATIENT)
Dept: OPHTHALMOLOGY | Facility: CLINIC | Age: 64
End: 2019-03-22

## 2019-03-22 NOTE — TELEPHONE ENCOUNTER
Called patient regarding diabetic eye screening results left voicemail .Requires eye exam due to inadequate image quality for one or both eyes. Follw up in 1 month.

## 2019-04-10 DIAGNOSIS — E11.9 TYPE 2 DIABETES MELLITUS WITHOUT COMPLICATION, WITHOUT LONG-TERM CURRENT USE OF INSULIN: ICD-10-CM

## 2019-04-10 RX ORDER — METFORMIN HYDROCHLORIDE 1000 MG/1
1000 TABLET ORAL 2 TIMES DAILY WITH MEALS
Qty: 60 TABLET | Refills: 5 | Status: SHIPPED | OUTPATIENT
Start: 2019-04-10 | End: 2019-04-22 | Stop reason: SDUPTHER

## 2019-04-22 DIAGNOSIS — E11.9 TYPE 2 DIABETES MELLITUS WITHOUT COMPLICATION, WITHOUT LONG-TERM CURRENT USE OF INSULIN: ICD-10-CM

## 2019-04-22 RX ORDER — METFORMIN HYDROCHLORIDE 1000 MG/1
1000 TABLET ORAL 2 TIMES DAILY WITH MEALS
Qty: 180 TABLET | Refills: 1 | Status: SHIPPED | OUTPATIENT
Start: 2019-04-22 | End: 2019-09-18 | Stop reason: SDUPTHER

## 2019-04-22 NOTE — TELEPHONE ENCOUNTER
----- Message from Jo-Ann Dickerson sent at 4/22/2019 12:30 PM CDT -----  Contact: self / [phone]    Patient is requesting a refill on the below. Please advise    90 day supply         metFORMIN (GLUCOPHAGE) 1000 MG tablet    Pharmacy     [id number] - [place], [place] - [id number] [facility], [address]

## 2019-07-09 ENCOUNTER — TELEPHONE (OUTPATIENT)
Dept: FAMILY MEDICINE | Facility: CLINIC | Age: 64
End: 2019-07-09

## 2019-07-09 NOTE — TELEPHONE ENCOUNTER
----- Message from Justina Patricio sent at 7/9/2019 10:42 AM CDT -----  Contact: Self [phone]  Patient would like to speak with you about a personal matter. Please advise

## 2019-07-09 NOTE — TELEPHONE ENCOUNTER
Patient states he only has 10 pills of Fenofibrate left. Would like refill on Fenofibrate 140mg because that's what [facility] has in stock. Patient scheduled for follow up on 9/18/2019. Please advise.

## 2019-07-10 RX ORDER — FENOFIBRATE 160 MG/1
160 TABLET ORAL NIGHTLY
Qty: 90 TABLET | Refills: 0 | Status: SHIPPED | OUTPATIENT
Start: 2019-07-10 | End: 2019-10-05 | Stop reason: SDUPTHER

## 2019-07-10 NOTE — TELEPHONE ENCOUNTER
I notified patient that I sent the prescription for the Fenofibrate 160 mg daily to [facility] because patient reports having problems with mailorder.   I tried to call [facility] to ensure they had the fenofibrate 160 mg daily but there was no answer.     Staff - please call [facility] today and confirm receipt of prescription and that they do have medication available please.

## 2019-07-10 NOTE — TELEPHONE ENCOUNTER
Called pharmacy and asked if they received the receipt of prescription for pt fenofibrate. Pharmacy stated that they received the receipt of prescription and that they had the medication available there

## 2019-07-31 DIAGNOSIS — F41.9 ANXIETY: ICD-10-CM

## 2019-07-31 RX ORDER — CLONAZEPAM 1 MG/1
1 TABLET ORAL NIGHTLY PRN
Qty: 30 TABLET | Refills: 2 | Status: CANCELLED | OUTPATIENT
Start: 2019-07-31

## 2019-07-31 NOTE — TELEPHONE ENCOUNTER
----- Message from Lucila Hannon sent at 7/31/2019 10:30 AM CDT -----  Contact: self, [phone] (M)  Patient requests refills for the following medications sent to [facility] in [place]:    Meloxicam 7.5 mg - stopped taking for a while but will start taking once daily or as needed due to shoulder started to hurt again  Clonazepam 1 mg - takes half or a whole pill every night

## 2019-07-31 NOTE — TELEPHONE ENCOUNTER
Spoke with patient on phone.  Advised patient that on MARCH 2019 visit - patient had asked for something stronger than Meloxicam so I changed him to Etodolac prn so advised that he should take this medication prn shoulder pain =- patient states he never took the medication because he read about the potential side effects.  Advised patient that Etodolac and Meloxicam are the same class of medication so they have the same side effects risks - patient verbalizes understanding and will take the Etodolac prn.    Patient had also requested Clonazepam - advised patient that he is supposed to be taking only 1/2 tablet and ONLY as needed at night and I wrote 1 prescription for #30 with 2 refills in March so he is not due for refill - patient states he has a refill that he noted is still available at pharmacy.

## 2019-09-07 DIAGNOSIS — I10 ESSENTIAL HYPERTENSION: ICD-10-CM

## 2019-09-09 RX ORDER — LISINOPRIL 40 MG/1
TABLET ORAL
Qty: 90 TABLET | Refills: 0 | Status: SHIPPED | OUTPATIENT
Start: 2019-09-09 | End: 2019-09-18 | Stop reason: ALTCHOICE

## 2019-09-12 LAB
ALBUMIN SERPL-MCNC: 5.1 G/DL (ref 3.6–5.1)
ALBUMIN/GLOB SERPL: 2.2 (CALC) (ref 1–2.5)
ALP SERPL-CCNC: 55 U/L (ref 40–115)
ALT SERPL-CCNC: 33 U/L (ref 9–46)
AST SERPL-CCNC: 22 U/L (ref 10–35)
BASOPHILS # BLD AUTO: 31 CELLS/UL (ref 0–200)
BASOPHILS NFR BLD AUTO: 0.4 %
BILIRUB SERPL-MCNC: 0.6 MG/DL (ref 0.2–1.2)
BUN SERPL-MCNC: 13 MG/DL (ref 7–25)
BUN/CREAT SERPL: ABNORMAL (CALC) (ref 6–22)
CALCIUM SERPL-MCNC: 10.8 MG/DL (ref 8.6–10.3)
CHLORIDE SERPL-SCNC: 99 MMOL/L (ref 98–110)
CHOLEST SERPL-MCNC: 148 MG/DL
CHOLEST/HDLC SERPL: 2.5 (CALC)
CO2 SERPL-SCNC: 29 MMOL/L (ref 20–32)
CREAT SERPL-MCNC: 0.9 MG/DL (ref 0.7–1.25)
EOSINOPHIL # BLD AUTO: 216 CELLS/UL (ref 15–500)
EOSINOPHIL NFR BLD AUTO: 2.8 %
ERYTHROCYTE [DISTWIDTH] IN BLOOD BY AUTOMATED COUNT: 12.2 % (ref 11–15)
GFRSERPLBLD MDRD-ARVRAT: 91 ML/MIN/1.73M2
GLOBULIN SER CALC-MCNC: 2.3 G/DL (CALC) (ref 1.9–3.7)
GLUCOSE SERPL-MCNC: 145 MG/DL (ref 65–99)
HBA1C MFR BLD: 5.8 % OF TOTAL HGB
HCT VFR BLD AUTO: 47.6 % (ref 38.5–50)
HDLC SERPL-MCNC: 60 MG/DL
HGB BLD-MCNC: 16.2 G/DL (ref 13.2–17.1)
LDLC SERPL CALC-MCNC: 73 MG/DL (CALC)
LYMPHOCYTES # BLD AUTO: 1756 CELLS/UL (ref 850–3900)
LYMPHOCYTES NFR BLD AUTO: 22.8 %
MCH RBC QN AUTO: 31.5 PG (ref 27–33)
MCHC RBC AUTO-ENTMCNC: 34 G/DL (ref 32–36)
MCV RBC AUTO: 92.4 FL (ref 80–100)
MONOCYTES # BLD AUTO: 855 CELLS/UL (ref 200–950)
MONOCYTES NFR BLD AUTO: 11.1 %
NEUTROPHILS # BLD AUTO: 4843 CELLS/UL (ref 1500–7800)
NEUTROPHILS NFR BLD AUTO: 62.9 %
NONHDLC SERPL-MCNC: 88 MG/DL (CALC)
PLATELET # BLD AUTO: 303 THOUSAND/UL (ref 140–400)
PMV BLD REES-ECKER: 10.6 FL (ref 7.5–12.5)
POTASSIUM SERPL-SCNC: 5.4 MMOL/L (ref 3.5–5.3)
PROT SERPL-MCNC: 7.4 G/DL (ref 6.1–8.1)
PSA SERPL-MCNC: 0.5 NG/ML
RBC # BLD AUTO: 5.15 MILLION/UL (ref 4.2–5.8)
SODIUM SERPL-SCNC: 136 MMOL/L (ref 135–146)
TRIGL SERPL-MCNC: 74 MG/DL
TSH SERPL-ACNC: 3.1 MIU/L (ref 0.4–4.5)
WBC # BLD AUTO: 7.7 THOUSAND/UL (ref 3.8–10.8)

## 2019-09-18 ENCOUNTER — OFFICE VISIT (OUTPATIENT)
Dept: FAMILY MEDICINE | Facility: CLINIC | Age: 64
End: 2019-09-18
Payer: COMMERCIAL

## 2019-09-18 VITALS
TEMPERATURE: 98 F | DIASTOLIC BLOOD PRESSURE: 86 MMHG | SYSTOLIC BLOOD PRESSURE: 138 MMHG | WEIGHT: 176.81 LBS | BODY MASS INDEX: 28.42 KG/M2 | HEART RATE: 68 BPM | HEIGHT: 66 IN | OXYGEN SATURATION: 97 % | RESPIRATION RATE: 18 BRPM

## 2019-09-18 DIAGNOSIS — F41.9 ANXIETY: ICD-10-CM

## 2019-09-18 DIAGNOSIS — I10 ESSENTIAL HYPERTENSION: ICD-10-CM

## 2019-09-18 DIAGNOSIS — E87.5 HYPERKALEMIA: ICD-10-CM

## 2019-09-18 DIAGNOSIS — E78.5 HYPERLIPIDEMIA ASSOCIATED WITH TYPE 2 DIABETES MELLITUS: ICD-10-CM

## 2019-09-18 DIAGNOSIS — E11.69 HYPERLIPIDEMIA ASSOCIATED WITH TYPE 2 DIABETES MELLITUS: ICD-10-CM

## 2019-09-18 DIAGNOSIS — Z00.00 ANNUAL PHYSICAL EXAM: Primary | ICD-10-CM

## 2019-09-18 DIAGNOSIS — E11.9 TYPE 2 DIABETES MELLITUS WITHOUT COMPLICATION, WITHOUT LONG-TERM CURRENT USE OF INSULIN: ICD-10-CM

## 2019-09-18 DIAGNOSIS — M25.50 ARTHRALGIA, UNSPECIFIED JOINT: ICD-10-CM

## 2019-09-18 PROCEDURE — 99396 PREV VISIT EST AGE 40-64: CPT | Mod: S$GLB,,, | Performed by: NURSE PRACTITIONER

## 2019-09-18 PROCEDURE — 3075F PR MOST RECENT SYSTOLIC BLOOD PRESS GE 130-139MM HG: ICD-10-PCS | Mod: CPTII,S$GLB,, | Performed by: NURSE PRACTITIONER

## 2019-09-18 PROCEDURE — 3079F DIAST BP 80-89 MM HG: CPT | Mod: CPTII,S$GLB,, | Performed by: NURSE PRACTITIONER

## 2019-09-18 PROCEDURE — 3044F PR MOST RECENT HEMOGLOBIN A1C LEVEL <7.0%: ICD-10-PCS | Mod: CPTII,S$GLB,, | Performed by: NURSE PRACTITIONER

## 2019-09-18 PROCEDURE — 99999 PR PBB SHADOW E&M-EST. PATIENT-LVL V: CPT | Mod: PBBFAC,,, | Performed by: NURSE PRACTITIONER

## 2019-09-18 PROCEDURE — 3044F HG A1C LEVEL LT 7.0%: CPT | Mod: CPTII,S$GLB,, | Performed by: NURSE PRACTITIONER

## 2019-09-18 PROCEDURE — 3075F SYST BP GE 130 - 139MM HG: CPT | Mod: CPTII,S$GLB,, | Performed by: NURSE PRACTITIONER

## 2019-09-18 PROCEDURE — 3079F PR MOST RECENT DIASTOLIC BLOOD PRESSURE 80-89 MM HG: ICD-10-PCS | Mod: CPTII,S$GLB,, | Performed by: NURSE PRACTITIONER

## 2019-09-18 PROCEDURE — 99396 PR PREVENTIVE VISIT,EST,40-64: ICD-10-PCS | Mod: S$GLB,,, | Performed by: NURSE PRACTITIONER

## 2019-09-18 PROCEDURE — 99999 PR PBB SHADOW E&M-EST. PATIENT-LVL V: ICD-10-PCS | Mod: PBBFAC,,, | Performed by: NURSE PRACTITIONER

## 2019-09-18 RX ORDER — ROSUVASTATIN CALCIUM 40 MG/1
40 TABLET, COATED ORAL DAILY
Qty: 90 TABLET | Refills: 1 | Status: SHIPPED | OUTPATIENT
Start: 2019-09-18 | End: 2020-03-31 | Stop reason: SDUPTHER

## 2019-09-18 RX ORDER — FENOFIBRATE 160 MG/1
160 TABLET ORAL NIGHTLY
Qty: 90 TABLET | Refills: 0 | Status: CANCELLED | OUTPATIENT
Start: 2019-09-18

## 2019-09-18 RX ORDER — CLONAZEPAM 1 MG/1
1 TABLET ORAL NIGHTLY PRN
Qty: 30 TABLET | Refills: 2 | Status: SHIPPED | OUTPATIENT
Start: 2019-09-18 | End: 2020-05-27 | Stop reason: SDUPTHER

## 2019-09-18 RX ORDER — BROMPHENIRAMINE MALEATE, DEXTROMETHORPHAN HBR, PHENYLEPHRINE HCL, DIPHENHYDRAMINE HCL, PHENYLEPHRINE HCL 0.52G
3 KIT ORAL 2 TIMES DAILY
COMMUNITY

## 2019-09-18 RX ORDER — MELOXICAM 7.5 MG/1
7.5 TABLET ORAL DAILY
Qty: 90 TABLET | Refills: 1 | Status: SHIPPED | OUTPATIENT
Start: 2019-09-18 | End: 2021-01-12 | Stop reason: ALTCHOICE

## 2019-09-18 RX ORDER — LISINOPRIL 40 MG/1
40 TABLET ORAL DAILY
Qty: 90 TABLET | Refills: 0 | Status: CANCELLED | OUTPATIENT
Start: 2019-09-18

## 2019-09-18 RX ORDER — VALSARTAN 320 MG/1
320 TABLET ORAL DAILY
Qty: 30 TABLET | Refills: 0 | Status: SHIPPED | OUTPATIENT
Start: 2019-09-18 | End: 2019-10-03 | Stop reason: SDUPTHER

## 2019-09-18 RX ORDER — METFORMIN HYDROCHLORIDE 1000 MG/1
1000 TABLET ORAL 2 TIMES DAILY WITH MEALS
Qty: 180 TABLET | Refills: 1 | Status: SHIPPED | OUTPATIENT
Start: 2019-09-18 | End: 2020-03-31 | Stop reason: SDUPTHER

## 2019-09-18 NOTE — PATIENT INSTRUCTIONS
Discharge Instructions: Eating a Low-Potassium Diet  Your health care provider has prescribed a low-potassium diet for you. This kind of diet is advised for people who have certain kidney problems. Potassium is needed for muscle function. But too much potassium is a health risk. Potassium is found in many foods. Read below to find out how to change your diet.  Foods to limit  Some foods are high in potassium. Limit your daily intake of the foods in the list below.  · Fruits: apricots (canned and fresh), bananas, cantaloupe, honeydew melon, kiwi, nectarines, pomegranates, oranges, orange juice, pears, dried fruits (apricots, dates, figs, prunes), and prune juice  · Vegetables: asparagus, avocado, artichoke, bamboo shoots, beets, brussels sprouts, cabbage, celery, chard, okra, potatoes (white and sweet), pumpkin, rutabaga, spinach (cooked), squash, tomato, tomato sauce, tomato juice, and vegetable juice cocktail  · Legumes: black-eyed peas, chickpeas, lentils, lima beans, navy beans, red kidney beans, soybeans, and split peas  · Nuts and seeds: almonds, Brazil nuts, cashews, peanuts, peanut butter, pecans, pumpkin seeds, sunflower seeds, and walnuts  · Breads and cereals: bran and whole-grain products  · Dairy foods: milk, cheese, ice cream, yogurt  · Animal protein: all forms of animal protein  · Other: chocolate, cocoa, coconut milk, and molasses  Tips  · Ask your health care provider how much potassium you are allowed each day. This will help you figure out serving sizes for your needs.  · Check labels for potassium. It may be listed as potassium chloride.  · Do not use salt substitutes. These often have potassium in them.  · Cook frozen fruits and vegetables in water. Rinse and drain them well before eating.  · Drain liquid from all canned fruits and vegetables. Rinse them before eating.  · Reduce the potassium in potatoes. Peel them, slice thinly, and soak in water for at least 4 hours.  · Reduce the potassium  in green leafy vegetables. Soak them in water for at least 4 hours.  · Eat white rice and refined white flour products. These include white bread, pasta, and grits.  Follow-up  Make a follow-up appointment as advised by our staff.  When to call your health care provider  Call your health care provider right away if you have any of the following:  · Fatigue  · Shortness of breath  · Chest pain  · Slow, irregular heartbeat  · Fainting  · Dizziness  · Lightheadedness  · Confusion   Date Last Reviewed: 6/21/2015  © 8182-0390 [employer]. [address] [place] [id number]. All rights reserved. This information is not intended as a substitute for professional medical care. Always follow your healthcare professional's instructions.

## 2019-09-18 NOTE — PROGRESS NOTES
"Subjective:       Patient ID: Bartolome Esquivel Jr. is a 64 y.o. male.    Chief Complaint: Annual Exam    Patient is a 64 year old white male with Type 2 Diabetes, Hypertension, Hyperlipidemia, Anxiety, chronic mildly elevated liver enzymes, and chronic arthralgia that is here today for annual physical exam with fasting lab results.     Patient has Type 2 Diabetes that was uncontrolled on Metformin 1000 mg twice daily with a  with hemoglobin A1c 7.5% in October 2018. I had added on Januvia 100 mg daily but patient reported he did not take the medication because it had gotten lost in the mail and once he received it, he had already worked on weight loss and started himself back on the Fenofibrate that he had stopped taking due to acid reflux and has now lost 25 pounds and tolerating all medications.  He only notified me of not starting the Januvia and getting back on the Fenofibrate in Feb. 2019 so I had advised patient that since he had weight loss, he could wait for fasting labs before making any other medication adjustments.  Patient's fasting blood sugar was controlled in March 2019 with FBG 140 with HgbA1C of 6.3% on Metformin only so we stayed on that medication alone.  Patient is NOW in September 2019 CONTROLLED on Metformin alone with  and hemoglobin A1c of 5.8%.  Much improvement essentially with weight loss.     Patient has Hypertension and currently taking Lisinopril 40 mg daily.  Blood pressure is uncontrolled and potassium is mildly elevated at 5.4.  I will change blood pressure medicine and recheck in 4 weeks.  /86   Pulse 68   Temp 97.8 °F (36.6 °C) (Oral)   Resp 18   Ht 5' 6" (1.676 m)   Wt 80.2 kg (176 lb 12.9 oz)   SpO2 97%   BMI 28.54 kg/m²      Patient has Hyperlipidemia. Patient had stopped taking the Fenofibrate due to acid reflux but triglcyerides had remained high on just Rosuvastatin 40 mg daily.  Patient also had elevated liver enzymes.  Patient reports that he ended " up starting back on Fenofibrate at last visit taking at night and lost 25 pounds and no longer has the acid reflux.  Cholesterol levels are now WNL and triglycerides are much improved.      Patient has anxiety and takes Clonazepam only as needed at night. He takes only 1/2 tablet when needed and not every night.  Patient is aware that we will need to work on weaning down on this medication - at next visit -will cut back from the Clonazepam 1mg tablet down to the 0.5 mg tablet.  Before age 65 - we need to work on getting off the clonazepam and onto a safer medication.     Patient has chronic right shoulder painarthralgia that gets relief with Meloxicam as needed.  I had tried changing to Etodolac because the Meloxicam was not helping with back pains but patient reports the Etodolac caused headaches and wanted to get back on Meloxicam.     Patient has had a mildly elevated ALT since June 2017 that we are monitoring every 6 months.  Suspect fatty liver.  His liver enzymes had gone up in October 2018 but his triglycerides and glucose had also gone up supporting suspected fatty liver.  With weight loss and lifestyle modifications, the liver enzymes are now back within normal range.     Wellness labs:  -  CBC within normal limits  -  CMP okay other than a mildly elevated potassium level of 5.4 and calcium level is mildly elevated at 10.8 with no history of elevations in the past.  Will monitor diet and recheck in 4 weeks.  Liver enzymes are back within normal range  -  cholesterol levels are well controlled on rosuvastatin and fenofibrate at present doses  -  thyroid screening is normal  -  PSA level is normal    Health maintenance:  -  refused all vaccinations due to fear of needles      Component      Latest Ref Rng & Units 9/11/2019 3/13/2019 10/24/2018   WBC      3.8 - 10.8 Thousand/uL 7.7     RBC      4.20 - 5.80 Million/uL 5.15     Hemoglobin      13.2 - 17.1 g/dL 16.2     Hematocrit      38.5 - 50.0 % 47.6      MCV      80.0 - 100.0 fL 92.4     MCH      27.0 - 33.0 pg 31.5     MCHC      32.0 - 36.0 g/dL 34.0     RDW      11.0 - 15.0 % 12.2     Platelets      140 - 400 Thousand/uL 303     MPV      7.5 - 12.5 fL 10.6     Neutrophils Absolute      1,500 - 7,800 cells/uL 4,843     Lymph #      850 - 3,900 cells/uL 1,756     Mono #      200 - 950 cells/uL 855     Eos #      15 - 500 cells/uL 216     Baso #      0 - 200 cells/uL 31     Neutrophils Relative      % 62.9     Lymph%      % 22.8     Mono%      % 11.1     Eosinophil%      % 2.8     Basophil%      % 0.4     Glucose      65 - 99 mg/dL 145 (H) 140 (H) 188 (H)   BUN, Bld      7 - 25 mg/dL 13 9 9   Creatinine      0.70 - 1.25 mg/dL 0.90 0.64 (L) 0.76   eGFR if non       > OR = 60 mL/min/1.73m2 91 105 98   eGFR if       > OR = 60 mL/min/1.73m2 106 122 114   BUN/Creatinine Ratio      6 - 22 (calc) NOT APPLICABLE 14 NOT APPLICABLE   Sodium      135 - 146 mmol/L 136 127 (L) 136   Potassium      3.5 - 5.3 mmol/L 5.4 (H) 5.3 5.1   Chloride      98 - 110 mmol/L 99 95 (L) 97 (L)   CO2      20 - 32 mmol/L 29 23 28   Calcium      8.6 - 10.3 mg/dL 10.8 (H) 10.1 10.1   PROTEIN TOTAL      6.1 - 8.1 g/dL 7.4 6.9 7.0   Albumin      3.6 - 5.1 g/dL 5.1 4.7 4.8   Globulin, Total      1.9 - 3.7 g/dL (calc) 2.3 2.2 2.2   Albumin/Globulin Ratio      1.0 - 2.5 (calc) 2.2 2.1 2.2   BILIRUBIN TOTAL      0.2 - 1.2 mg/dL 0.6 0.6 0.5   Alkaline Phosphatase      40 - 115 U/L 55 70 75   AST      10 - 35 U/L 22 38 (H) 41 (H)   ALT      9 - 46 U/L 33 46 78 (H)   Cholesterol      <200 mg/dL 148 129 143   HDL      >40 mg/dL 60 60 43   Triglycerides      <150 mg/dL 74 87 323 (H)   LDL Cholesterol External      mg/dL (calc) 73 52 61   Hdl/Cholesterol Ratio      <5.0 (calc) 2.5 2.2 3.3   Non HDL Chol. (LDL+VLDL)      <130 mg/dL (calc) 88 69 100   A1c      <5.7 % of total Hgb      Hemoglobin A1C External      <5.7 % of total Hgb 5.8 (H) 6.3 (H) 7.5 (H)   TSH      0.40 - 4.50  mIU/L 3.10       Component      Latest Ref Rng & Units 9/11/2019 2/21/2018 1/12/2017   PSA, SCREEN      0.00 - 4.00 ng/mL   0.52   PROSTATE SPECIFIC ANTIGEN, SCR - [facility]      < OR = 4.0 ng/mL 0.5 0.4          Current Outpatient Medications   Medication Sig Dispense Refill    aspirin (ECOTRIN) 81 MG EC tablet Take 1 tablet (81 mg total) by mouth once daily. 90 tablet 3    clonazePAM (KLONOPIN) 1 MG tablet Take 1 tablet (1 mg total) by mouth nightly as needed. 30 tablet 2    cyanocobalamin (VITAMIN B-12) 100 MCG tablet Take 100 mcg by mouth once daily.      fenofibrate 160 MG Tab Take 1 tablet (160 mg total) by mouth nightly. 90 tablet 0    metFORMIN (GLUCOPHAGE) 1000 MG tablet Take 1 tablet (1,000 mg total) by mouth 2 (two) times daily with meals. 180 tablet 1    psyllium 0.52 gram capsule Take 3 capsules by mouth 2 (two) times daily.      rosuvastatin (CRESTOR) 40 MG Tab Take 1 tablet (40 mg total) by mouth once daily. 90 tablet 1    vitamin D 1000 units Tab Take 4,000 Units by mouth once daily.      valsartan (DIOVAN) 320 MG tablet Take 1 tablet (320 mg total) by mouth once daily. 30 tablet 0     No current facility-administered medications for this visit.        Past Medical History:   Diagnosis Date    Anxiety     DM (diabetes mellitus) 11/4/2014    Hyperlipidemia     Hypertension     Impaired fasting glucose     Metabolic syndrome        Past Surgical History:   Procedure Laterality Date    COLONOSCOPY  2007    + polyp - tubular adenoma - Dr. Hernandez       Family History   Problem Relation Age of Onset    Cancer Mother         Lung Cancer and Colon Cancer    Heart disease Father 49        MI    Diabetes Father     No Known Problems Daughter     No Known Problems Son     No Known Problems Son        Social History     Socioeconomic History    Marital status:      Spouse name: Not on file    Number of children: Not on file    Years of education: Not on file    Highest education  level: Not on file   Occupational History     Employer: [employer]   Social Needs    Financial resource strain: Not on file    Food insecurity:     Worry: Not on file     Inability: Not on file    Transportation needs:     Medical: Not on file     Non-medical: Not on file   Tobacco Use    Smoking status: Former Smoker     Packs/day: 1.00     Years: 15.00     Pack years: 15.00     Types: Cigarettes     Last attempt to quit: 1994     Years since quittin.7    Smokeless tobacco: Current User     Types: Chew    Tobacco comment: Quit in    Substance and Sexual Activity    Alcohol use: Yes     Comment: Reports 1 to 2 beers per day on weekdays and 10 beers per day on weekend days    Drug use: No    Sexual activity: Not on file   Lifestyle    Physical activity:     Days per week: Not on file     Minutes per session: Not on file    Stress: Not on file   Relationships    Social connections:     Talks on phone: Not on file     Gets together: Not on file     Attends [employer] service: Not on file     Active member of club or organization: Not on file     Attends meetings of clubs or organizations: Not on file     Relationship status: Not on file   Other Topics Concern    Not on file   Social History Narrative    Not on file       Review of Systems   Constitutional: Negative for activity change, appetite change, fatigue, fever and unexpected weight change.   HENT: Negative for congestion, ear pain, mouth sores, nosebleeds, postnasal drip, rhinorrhea, sinus pressure, sneezing, sore throat, trouble swallowing and voice change.    Eyes: Negative.    Respiratory: Negative for cough, chest tightness and shortness of breath.    Cardiovascular: Negative for chest pain, palpitations and leg swelling.   Gastrointestinal: Negative.  Negative for abdominal pain, blood in stool, constipation, diarrhea, nausea and vomiting.   Endocrine: Negative.    Genitourinary: Negative for difficulty urinating, dysuria, flank pain,  "hematuria and urgency.   Musculoskeletal: Positive for arthralgias. Negative for back pain, gait problem, joint swelling, myalgias and neck pain.   Skin: Negative for color change, rash and wound.   Allergic/Immunologic: Negative for immunocompromised state.   Neurological: Negative for dizziness, tremors, seizures, syncope, speech difficulty and headaches.   Hematological: Negative for adenopathy. Does not bruise/bleed easily.   Psychiatric/Behavioral: Negative for behavioral problems, dysphoric mood, sleep disturbance and suicidal ideas. The patient is not nervous/anxious.          Objective:     Vitals:    09/18/19 0751 09/18/19 0801   BP: 136/86 138/86   BP Location: Right arm    Patient Position: Sitting    BP Method: Large (Manual)    Pulse: 68    Resp: 18    Temp: 97.8 °F (36.6 °C)    TempSrc: Oral    SpO2: 97%    Weight: 80.2 kg (176 lb 12.9 oz)    Height: 5' 6" (1.676 m)           Physical Exam   Constitutional: He is oriented to person, place, and time. He appears well-developed and well-nourished. No distress.   + obesity. Body mass index is 28.54 kg/m².   HENT:   Head: Normocephalic and atraumatic.   Right Ear: External ear normal.   Left Ear: External ear normal.   Nose: Nose normal.   Mouth/Throat: Oropharynx is clear and moist. No oropharyngeal exudate.   Eyes: Pupils are equal, round, and reactive to light. Conjunctivae and EOM are normal. Right eye exhibits no discharge. Left eye exhibits no discharge. No scleral icterus.   Neck: Normal range of motion. Neck supple. No JVD present. No tracheal deviation present. No thyromegaly present.   Cardiovascular: Normal rate and regular rhythm.   No murmur heard.  History premature contractions - monitored by cardiology Dr. Baker   Pulmonary/Chest: Effort normal and breath sounds normal. No stridor. No respiratory distress. He has no wheezes. He has no rales.   Abdominal: Soft. Bowel sounds are normal. He exhibits no distension. There is no tenderness. There " is no rebound and no guarding. A hernia is present.       Patient with ? Ventral hernia versus diastasis recti; nontender, only present with abdominal tension   Musculoskeletal: He exhibits no edema.   Lymphadenopathy:     He has no cervical adenopathy.   Neurological: He is alert and oriented to person, place, and time. Coordination normal.   Skin: Skin is warm and dry. No rash noted. He is not diaphoretic.   Psychiatric: He has a normal mood and affect. His behavior is normal.         Assessment:         ICD-10-CM ICD-9-CM   1. Annual physical exam Z00.00 V70.0   2. Type 2 diabetes mellitus without complication, without long-term current use of insulin E11.9 250.00   3. Hyperlipidemia associated with type 2 diabetes mellitus E11.69 250.80    E78.5 272.4   4. Essential hypertension I10 401.9   5. Anxiety F41.9 300.00   6. Hyperkalemia E87.5 276.7   7. Arthralgia, unspecified joint M25.50 719.40       Plan:       Annual physical exam  -  refuse pneumonia vaccine  -  refused shingles vaccine  -  refuse flu vaccine    Health Maintenance Summary     Pneumococcal Vaccine (Medium Risk) Postponed 9/18/2020 Originally 1[birth year]8/1974. Patient Refused   Shingles Vaccine Postponed 9/18/2020 Originally 1/8/2007. Patient Refused   Influenza Vaccine Postponed 10/5/2020 Originally 9/1/2019. Patient Refused    Declined 2/26/2018     Declined 1/13/2017     Declined 11/6/2015    Hemoglobin A1c Next Due 3/11/2020     Done 9/11/2019 HEMOGLOBIN A1C  Hemoglobin A1C External           Done 3/13/2019 HEMOGLOBIN A1C  Hemoglobin A1C External           Done 10/24/2018 HEMOGLOBIN A1C  Hemoglobin A1C External           Done 6/20/2018 HEMOGLOBIN A1C  A1c           Done 2/21/2018 HEMOGLOBIN A1C  A1c           Patient has more history with this topic...   Foot Exam Next Due 3/15/2020     Done 3/15/2019 [facility]: WORKFLOW - DIABETES - DIABETIC FOOT EXAM PERFORMED    Done 10/26/2018 [employer]: WORKFLOW - DIABETES - DIABETIC FOOT EXAM PERFORMED    Done  6/22/2018 [employer]: WORKFLOW - DIABETES - DIABETIC FOOT EXAM PERFORMED    Done 2/26/2018     Done 2/26/2018 [employer]: WORKFLOW - DIABETES - DIABETIC FOOT EXAM PERFORMED    Patient has more history with this topic...   Fecal Occult Blood Test (FOBT)/[facility] Next Due 3/16/2020     Done 3/16/2019 FECAL IMMUNOCHEMICAL TEST (IFOBT)   Eye Exam Next Due 3/18/2020     Done 3/18/2019 DIABETIC EYE SCREENING PHOTO    Done 12/20/2016 Dr. Humphries - no diabetic retinopathy   PROSTATE-SPECIFIC ANTIGEN Next Due 9/11/2020     Done 9/11/2019 PSA, SCREENING    Done 2/21/2018 PSA, SCREENING    Done 1/12/2017 PSA, SCREENING    Done 11/4/2015 PSA, SCREENING   Lipid Panel Next Due 9/11/2020     Done 9/11/2019 LIPID PANEL    Done 3/13/2019 LIPID PANEL    Done 10/24/2018 LIPID PANEL     Done 6/20/2018 LIPID PANEL     Done 2/21/2018 LIPID PANEL     Patient has more history with this topic...   Low Dose Statin Next Due 9/18/2020     Done 9/18/2019 Registry Metric: Last Current Statin Reviewed Date   TETANUS VACCINE Next Due 6/23/2026     Declined 6/23/2016    Hepatitis C Screening This plan is no longer active.     Done 6/22/2016 HEPATITIS C ANTIBODY       Type 2 diabetes mellitus without complication, without long-term current use of insulin  -  continue metformin a 1000 mg twice daily.  Will recheck in 6 months.  -     metFORMIN (GLUCOPHAGE) 1000 MG tablet; Take 1 tablet (1,000 mg total) by mouth 2 (two) times daily with meals.  Dispense: 180 tablet; Refill: 1    Hyperlipidemia associated with type 2 diabetes mellitus  -  continue rosuvastatin 40 and fenofibrate 160 mg daily and recheck in 6 months.  I had just refilled the fenofibrate less than a month ago.  -     rosuvastatin (CRESTOR) 40 MG Tab; Take 1 tablet (40 mg total) by mouth once daily.  Dispense: 90 tablet; Refill: 1    Essential hypertension  -  stop the lisinopril 40 mg due to blood pressure is uncontrolled and potassium is elevated at 5.4.  Changed to valsartan 320 mg daily in  the morning and recheck in 4 weeks.  -     valsartan (DIOVAN) 320 MG tablet; Take 1 tablet (320 mg total) by mouth once daily.  Dispense: 30 tablet; Refill: 0    Anxiety  -  the to work on cutting back on the clonazepam take only 1/2 of a tablet as needed at nighttime.  At next refill/visit we will cut back from the 1 mg to 0.5 mg tablet.  -     clonazePAM (KLONOPIN) 1 MG tablet; Take 1 tablet (1 mg total) by mouth nightly as needed.  Dispense: 30 tablet; Refill: 2    Hyperkalemia  -  will get repeat CMP in 4 weeks to recheck the potassium as well as the calcium level.  -     Cancel: Comprehensive metabolic panel; Future; Expected date: 09/18/2019  -     Comprehensive metabolic panel; Future; Expected date: 09/18/2019    Arthralgia, unspecified joint  -  may take meloxicam as needed for chronic joint pain  -     meloxicam (MOBIC) 7.5 MG tablet; Take 1 tablet (7.5 mg total) by mouth once daily.  Dispense: 90 tablet; Refill: 1      Follow up in about 4 weeks (around 10/16/2019) for cmp and blood pressure check.     Patient's Medications   New Prescriptions    VALSARTAN (DIOVAN) 320 MG TABLET    Take 1 tablet (320 mg total) by mouth once daily.   Previous Medications    ASPIRIN (ECOTRIN) 81 MG EC TABLET    Take 1 tablet (81 mg total) by mouth once daily.    CYANOCOBALAMIN (VITAMIN B-12) 100 MCG TABLET    Take 100 mcg by mouth once daily.    FENOFIBRATE 160 MG TAB    Take 1 tablet (160 mg total) by mouth nightly.    PSYLLIUM 0.52 GRAM CAPSULE    Take 3 capsules by mouth 2 (two) times daily.    VITAMIN D 1000 UNITS TAB    Take 4,000 Units by mouth once daily.   Modified Medications    Modified Medication Previous Medication    CLONAZEPAM (KLONOPIN) 1 MG TABLET clonazePAM (KLONOPIN) 1 MG tablet       Take 1 tablet (1 mg total) by mouth nightly as needed.    Take 1 tablet (1 mg total) by mouth nightly as needed.    MELOXICAM (MOBIC) 7.5 MG TABLET meloxicam (MOBIC) 7.5 MG tablet       Take 1 tablet (7.5 mg total) by mouth  once daily.    Take 1 tablet (7.5 mg total) by mouth once daily.    METFORMIN (GLUCOPHAGE) 1000 MG TABLET metFORMIN (GLUCOPHAGE) 1000 MG tablet       Take 1 tablet (1,000 mg total) by mouth 2 (two) times daily with meals.    Take 1 tablet (1,000 mg total) by mouth 2 (two) times daily with meals.    ROSUVASTATIN (CRESTOR) 40 MG TAB rosuvastatin (CRESTOR) 40 MG Tab       Take 1 tablet (40 mg total) by mouth once daily.    Take 1 tablet (40 mg total) by mouth once daily.   Discontinued Medications    ETODOLAC (LODINE) 400 MG TABLET    Take 1 tablet (400 mg total) by mouth 2 (two) times daily.    LISINOPRIL (PRINIVIL,ZESTRIL) 40 MG TABLET    TAKE ONE TABLET BY MOUTH EVERY DAY

## 2019-10-03 DIAGNOSIS — I10 ESSENTIAL HYPERTENSION: ICD-10-CM

## 2019-10-03 RX ORDER — VALSARTAN 320 MG/1
320 TABLET ORAL DAILY
Qty: 30 TABLET | Refills: 0 | Status: SHIPPED | OUTPATIENT
Start: 2019-10-03 | End: 2019-10-30 | Stop reason: SDUPTHER

## 2019-10-03 NOTE — TELEPHONE ENCOUNTER
----- Message from Jo-Ann Duggan sent at 10/3/2019  3:07 PM CDT -----  Contact: Bartolome  Patient will need his blood pressure medication rescheduled before his sees CHASE Carranza again on 10/30.

## 2019-10-05 RX ORDER — FENOFIBRATE 160 MG/1
160 TABLET ORAL NIGHTLY
Qty: 90 TABLET | Refills: 0 | Status: SHIPPED | OUTPATIENT
Start: 2019-10-05 | End: 2020-01-02

## 2019-10-22 LAB
ALBUMIN SERPL-MCNC: 5 G/DL (ref 3.6–5.1)
ALBUMIN/GLOB SERPL: 2.1 (CALC) (ref 1–2.5)
ALP SERPL-CCNC: 52 U/L (ref 40–115)
ALT SERPL-CCNC: 25 U/L (ref 9–46)
AST SERPL-CCNC: 19 U/L (ref 10–35)
BILIRUB SERPL-MCNC: 0.6 MG/DL (ref 0.2–1.2)
BUN SERPL-MCNC: 14 MG/DL (ref 7–25)
BUN/CREAT SERPL: ABNORMAL (CALC) (ref 6–22)
CALCIUM SERPL-MCNC: 10.8 MG/DL (ref 8.6–10.3)
CHLORIDE SERPL-SCNC: 99 MMOL/L (ref 98–110)
CO2 SERPL-SCNC: 30 MMOL/L (ref 20–32)
CREAT SERPL-MCNC: 0.88 MG/DL (ref 0.7–1.25)
GFRSERPLBLD MDRD-ARVRAT: 92 ML/MIN/1.73M2
GLOBULIN SER CALC-MCNC: 2.4 G/DL (CALC) (ref 1.9–3.7)
GLUCOSE SERPL-MCNC: 123 MG/DL (ref 65–99)
POTASSIUM SERPL-SCNC: 5 MMOL/L (ref 3.5–5.3)
PROT SERPL-MCNC: 7.4 G/DL (ref 6.1–8.1)
SODIUM SERPL-SCNC: 137 MMOL/L (ref 135–146)

## 2019-10-30 ENCOUNTER — OFFICE VISIT (OUTPATIENT)
Dept: FAMILY MEDICINE | Facility: CLINIC | Age: 64
End: 2019-10-30
Payer: COMMERCIAL

## 2019-10-30 VITALS
SYSTOLIC BLOOD PRESSURE: 120 MMHG | OXYGEN SATURATION: 96 % | DIASTOLIC BLOOD PRESSURE: 70 MMHG | RESPIRATION RATE: 18 BRPM | TEMPERATURE: 98 F | HEIGHT: 66 IN | WEIGHT: 174.81 LBS | BODY MASS INDEX: 28.09 KG/M2 | HEART RATE: 71 BPM

## 2019-10-30 DIAGNOSIS — I10 ESSENTIAL HYPERTENSION: Primary | ICD-10-CM

## 2019-10-30 DIAGNOSIS — E83.52 HYPERCALCEMIA: ICD-10-CM

## 2019-10-30 DIAGNOSIS — E11.69 HYPERLIPIDEMIA ASSOCIATED WITH TYPE 2 DIABETES MELLITUS: ICD-10-CM

## 2019-10-30 DIAGNOSIS — E11.9 TYPE 2 DIABETES MELLITUS WITHOUT COMPLICATION, WITHOUT LONG-TERM CURRENT USE OF INSULIN: ICD-10-CM

## 2019-10-30 DIAGNOSIS — E78.5 HYPERLIPIDEMIA ASSOCIATED WITH TYPE 2 DIABETES MELLITUS: ICD-10-CM

## 2019-10-30 PROCEDURE — 3078F DIAST BP <80 MM HG: CPT | Mod: CPTII,S$GLB,, | Performed by: NURSE PRACTITIONER

## 2019-10-30 PROCEDURE — 3044F PR MOST RECENT HEMOGLOBIN A1C LEVEL <7.0%: ICD-10-PCS | Mod: CPTII,S$GLB,, | Performed by: NURSE PRACTITIONER

## 2019-10-30 PROCEDURE — 3008F BODY MASS INDEX DOCD: CPT | Mod: CPTII,S$GLB,, | Performed by: NURSE PRACTITIONER

## 2019-10-30 PROCEDURE — 3074F SYST BP LT 130 MM HG: CPT | Mod: CPTII,S$GLB,, | Performed by: NURSE PRACTITIONER

## 2019-10-30 PROCEDURE — 3044F HG A1C LEVEL LT 7.0%: CPT | Mod: CPTII,S$GLB,, | Performed by: NURSE PRACTITIONER

## 2019-10-30 PROCEDURE — 3074F PR MOST RECENT SYSTOLIC BLOOD PRESSURE < 130 MM HG: ICD-10-PCS | Mod: CPTII,S$GLB,, | Performed by: NURSE PRACTITIONER

## 2019-10-30 PROCEDURE — 3008F PR BODY MASS INDEX (BMI) DOCUMENTED: ICD-10-PCS | Mod: CPTII,S$GLB,, | Performed by: NURSE PRACTITIONER

## 2019-10-30 PROCEDURE — 3078F PR MOST RECENT DIASTOLIC BLOOD PRESSURE < 80 MM HG: ICD-10-PCS | Mod: CPTII,S$GLB,, | Performed by: NURSE PRACTITIONER

## 2019-10-30 PROCEDURE — 2024F 7 FLD RTA PHOTO EVC RTNOPTHY: CPT | Mod: S$GLB,,, | Performed by: NURSE PRACTITIONER

## 2019-10-30 PROCEDURE — 99999 PR PBB SHADOW E&M-EST. PATIENT-LVL IV: CPT | Mod: PBBFAC,,, | Performed by: NURSE PRACTITIONER

## 2019-10-30 PROCEDURE — 99999 PR PBB SHADOW E&M-EST. PATIENT-LVL IV: ICD-10-PCS | Mod: PBBFAC,,, | Performed by: NURSE PRACTITIONER

## 2019-10-30 PROCEDURE — 99214 PR OFFICE/OUTPT VISIT, EST, LEVL IV, 30-39 MIN: ICD-10-PCS | Mod: S$GLB,,, | Performed by: NURSE PRACTITIONER

## 2019-10-30 PROCEDURE — 99214 OFFICE O/P EST MOD 30 MIN: CPT | Mod: S$GLB,,, | Performed by: NURSE PRACTITIONER

## 2019-10-30 PROCEDURE — 2024F PR 7 FIELD PHOTOS WITH INTERP/ REVIEW: ICD-10-PCS | Mod: S$GLB,,, | Performed by: NURSE PRACTITIONER

## 2019-10-30 RX ORDER — VALSARTAN 320 MG/1
320 TABLET ORAL DAILY
Qty: 90 TABLET | Refills: 1 | Status: SHIPPED | OUTPATIENT
Start: 2019-10-30 | End: 2020-05-27 | Stop reason: SDUPTHER

## 2019-10-30 NOTE — PROGRESS NOTES
"Subjective:       Patient ID: Bartolome Esquivel Jr. is a 64 y.o. male.    Chief Complaint: Follow-up (Pt here for a 4 week f/u )    Patient is a 64 year old white male with Type 2 Diabetes, Hypertension, Hyperlipidemia, Anxiety, chronic mildly elevated liver enzymes, and chronic arthralgia that is here today for one month follow up.    All chronic problems were addressed in Sept. 2019 visit.  Patient is here today to follow up on Hypertension with blood pressure medication change as well as to recheck Potassium and Calcium levels that were abnormal last month.    Patient has Hypertension that was uncontrolled on lisinopril 40 mg daily as well as had an elevated potassium of 5.4 on lisinopril.  We stopped the lisinopril 40 mg daily and change patient to valsartan 320 mg daily.  Blood pressure is much improved on this medication and potassium level is back within normal range.  /70   Pulse 71   Temp 98.3 °F (36.8 °C) (Oral)   Resp 18   Ht 5' 6" (1.676 m)   Wt 79.3 kg (174 lb 13.2 oz)   SpO2 96%   BMI 28.22 kg/m²     Patient had mild hypercalcemia noted on wellness labs in September 2019.  Calcium level was 10.8.  No history of elevations in the past.  Repeat calcium level today remains at 10.8.  Patient does not take any calcium supplements.  Will monitor level as well as further workup  with PTH level and vitamin D level with next blood draw but since only very mild elevation - okay to wait until next visit in March 2020.    Component      Latest Ref Rng & Units 10/21/2019 9/11/2019 3/13/2019   Glucose      65 - 99 mg/dL 123 (H) 145 (H) 140 (H)   BUN, Bld      7 - 25 mg/dL 14 13 9   Creatinine      0.70 - 1.25 mg/dL 0.88 0.90 0.64 (L)   eGFR if non       > OR = 60 mL/min/1.73m2 92 91 105   eGFR if       > OR = 60 mL/min/1.73m2 107 106 122   BUN/Creatinine Ratio      6 - 22 (calc) NOT APPLICABLE NOT APPLICABLE 14   Sodium      135 - 146 mmol/L 137 136 127 (L)   Potassium      " 3.5 - 5.3 mmol/L 5.0 5.4 (H) 5.3   Chloride      98 - 110 mmol/L 99 99 95 (L)   CO2      20 - 32 mmol/L 30 29 23   Calcium      8.6 - 10.3 mg/dL 10.8 (H) 10.8 (H) 10.1   PROTEIN TOTAL      6.1 - 8.1 g/dL 7.4 7.4 6.9   Albumin      3.6 - 5.1 g/dL 5.0 5.1 4.7   Globulin, Total      1.9 - 3.7 g/dL (calc) 2.4 2.3 2.2   Albumin/Globulin Ratio      1.0 - 2.5 (calc) 2.1 2.2 2.1   BILIRUBIN TOTAL      0.2 - 1.2 mg/dL 0.6 0.6 0.6   Alkaline Phosphatase      40 - 115 U/L 52 55 70   AST      10 - 35 U/L 19 22 38 (H)   ALT      9 - 46 U/L 25 33 46       Current Outpatient Medications   Medication Sig Dispense Refill    aspirin (ECOTRIN) 81 MG EC tablet Take 1 tablet (81 mg total) by mouth once daily. 90 tablet 3    clonazePAM (KLONOPIN) 1 MG tablet Take 1 tablet (1 mg total) by mouth nightly as needed. 30 tablet 2    cyanocobalamin (VITAMIN B-12) 100 MCG tablet Take 100 mcg by mouth once daily.      fenofibrate 160 MG Tab TAKE 1 TABLET (160 MG TOTAL) BY MOUTH NIGHTLY. 90 tablet 0    meloxicam (MOBIC) 7.5 MG tablet Take 1 tablet (7.5 mg total) by mouth once daily. 90 tablet 1    metFORMIN (GLUCOPHAGE) 1000 MG tablet Take 1 tablet (1,000 mg total) by mouth 2 (two) times daily with meals. 180 tablet 1    psyllium 0.52 gram capsule Take 3 capsules by mouth 2 (two) times daily.      rosuvastatin (CRESTOR) 40 MG Tab Take 1 tablet (40 mg total) by mouth once daily. 90 tablet 1    valsartan (DIOVAN) 320 MG tablet Take 1 tablet (320 mg total) by mouth once daily. 30 tablet 0    vitamin D 1000 units Tab Take 4,000 Units by mouth once daily.       No current facility-administered medications for this visit.        Past Medical History:   Diagnosis Date    Anxiety     DM (diabetes mellitus) 11/4/2014    Hyperlipidemia     Hypertension     Impaired fasting glucose     Metabolic syndrome        Past Surgical History:   Procedure Laterality Date    COLONOSCOPY  2007    + polyp - tubular adenoma - Dr. Hernandez       Family  History   Problem Relation Age of Onset    Cancer Mother         Lung Cancer and Colon Cancer    Heart disease Father 49        MI    Diabetes Father     No Known Problems Daughter     No Known Problems Son     No Known Problems Son        Social History     Socioeconomic History    Marital status:      Spouse name: Not on file    Number of children: Not on file    Years of education: Not on file    Highest education level: Not on file   Occupational History     Employer: [employer]   Social Needs    Financial resource strain: Not on file    Food insecurity:     Worry: Not on file     Inability: Not on file    Transportation needs:     Medical: Not on file     Non-medical: Not on file   Tobacco Use    Smoking status: Former Smoker     Packs/day: 1.00     Years: 15.00     Pack years: 15.00     Types: Cigarettes     Last attempt to quit: 1994     Years since quittin.8    Smokeless tobacco: Current User     Types: Chew    Tobacco comment: Quit in    Substance and Sexual Activity    Alcohol use: Yes     Comment: Reports 1 to 2 beers per day on weekdays and 10 beers per day on weekend days    Drug use: No    Sexual activity: Not on file   Lifestyle    Physical activity:     Days per week: Not on file     Minutes per session: Not on file    Stress: Not on file   Relationships    Social connections:     Talks on phone: Not on file     Gets together: Not on file     Attends Restorationism service: Not on file     Active member of club or organization: Not on file     Attends meetings of clubs or organizations: Not on file     Relationship status: Not on file   Other Topics Concern    Not on file   Social History Narrative    Not on file       Review of Systems   Constitutional: Negative for activity change, appetite change, fatigue, fever and unexpected weight change.   HENT: Negative for congestion, ear pain, mouth sores, nosebleeds, postnasal drip, rhinorrhea, sinus pressure, sneezing,  "sore throat, trouble swallowing and voice change.    Eyes: Negative.    Respiratory: Negative for cough, chest tightness and shortness of breath.    Cardiovascular: Negative for chest pain, palpitations and leg swelling.   Gastrointestinal: Negative.  Negative for abdominal pain, blood in stool, constipation, diarrhea, nausea and vomiting.   Endocrine: Negative.    Genitourinary: Negative for difficulty urinating, dysuria, flank pain, hematuria and urgency.   Musculoskeletal: Negative for arthralgias, back pain, gait problem, joint swelling, myalgias and neck pain.        Intermittent sciatic pain when doing chores around the house   Skin: Negative for color change, rash and wound.   Allergic/Immunologic: Negative for immunocompromised state.   Neurological: Negative for dizziness, tremors, seizures, syncope, speech difficulty and headaches.   Hematological: Negative for adenopathy. Does not bruise/bleed easily.   Psychiatric/Behavioral: Negative for behavioral problems, dysphoric mood, sleep disturbance and suicidal ideas. The patient is not nervous/anxious.          Objective:     Vitals:    10/30/19 0717   BP: 120/62   BP Location: Left arm   Patient Position: Sitting   BP Method: Small (Manual)   Pulse: 71   Resp: 18   Temp: 98.3 °F (36.8 °C)   TempSrc: Oral   SpO2: 96%   Weight: 79.3 kg (174 lb 13.2 oz)   Height: 5' 6" (1.676 m)          Physical Exam   Constitutional: He is oriented to person, place, and time. He appears well-developed and well-nourished. No distress.   Body mass index is 28.22 kg/m².     HENT:   Head: Normocephalic and atraumatic.   Right Ear: External ear normal.   Left Ear: External ear normal.   Nose: Nose normal.   Mouth/Throat: Oropharynx is clear and moist. No oropharyngeal exudate.   Eyes: Pupils are equal, round, and reactive to light. Conjunctivae and EOM are normal. Right eye exhibits no discharge. Left eye exhibits no discharge. No scleral icterus.   Neck: Normal range of motion. " Neck supple. No JVD present. No tracheal deviation present. No thyromegaly present.   Cardiovascular: Normal rate and regular rhythm.   No murmur heard.  History premature contractions - monitored by cardiology Dr. Baker   Pulmonary/Chest: Effort normal and breath sounds normal. No stridor. No respiratory distress. He has no wheezes. He has no rales.   Abdominal: Soft. Bowel sounds are normal. He exhibits no distension. There is no tenderness. There is no rebound and no guarding. A hernia is present.       Patient with ? Ventral hernia versus diastasis recti; nontender, only present with abdominal tension   Musculoskeletal: He exhibits no edema.   Lymphadenopathy:     He has no cervical adenopathy.   Neurological: He is alert and oriented to person, place, and time. Coordination normal.   Skin: Skin is warm and dry. No rash noted. He is not diaphoretic.   Psychiatric: He has a normal mood and affect. His behavior is normal.         Assessment:         ICD-10-CM ICD-9-CM   1. Essential hypertension I10 401.9   2. Hypercalcemia E83.52 275.42   3. Type 2 diabetes mellitus without complication, without long-term current use of insulin E11.9 250.00   4. Hyperlipidemia associated with type 2 diabetes mellitus E11.69 250.80    E78.5 272.4       Plan:       Essential hypertension  -  much improved on valsartan 320 mg daily.  Will recheck in March 2020.  -     valsartan (DIOVAN) 320 MG tablet; Take 1 tablet (320 mg total) by mouth once daily.  Dispense: 90 tablet; Refill: 1    Hypercalcemia  -  will check calcium, PTH and vitamin-D with next blood draw for further evaluation.  -     Cancel: Vitamin D; Future; Expected date: 10/30/2019  -     Cancel: PTH, intact; Future; Expected date: 10/30/2019  -     Cancel: Calcium, ionized; Future; Expected date: 10/30/2019  -     Vitamin D; Future; Expected date: 10/30/2019  -     PTH, intact; Future; Expected date: 10/30/2019  -     Calcium, ionized; Future; Expected date:  10/30/2019    Type 2 diabetes mellitus without complication, without long-term current use of insulin  -  due to recheck in March 2020  -     Cancel: Comprehensive metabolic panel; Future; Expected date: 10/30/2019  -     Cancel: Hemoglobin A1c; Future; Expected date: 10/30/2019  -     Comprehensive metabolic panel; Future; Expected date: 10/30/2019  -     Hemoglobin A1c; Future; Expected date: 10/30/2019    Hyperlipidemia associated with type 2 diabetes mellitus  -  due to recheck in March 2020  -     Cancel: Lipid panel; Future; Expected date: 10/30/2019  -     Lipid panel; Future; Expected date: 10/30/2019      Follow up in about 5 months (around 3/30/2020) for fasting labs and visit.     Patient's Medications   New Prescriptions    No medications on file   Previous Medications    ASPIRIN (ECOTRIN) 81 MG EC TABLET    Take 1 tablet (81 mg total) by mouth once daily.    CLONAZEPAM (KLONOPIN) 1 MG TABLET    Take 1 tablet (1 mg total) by mouth nightly as needed.    CYANOCOBALAMIN (VITAMIN B-12) 100 MCG TABLET    Take 100 mcg by mouth once daily.    FENOFIBRATE 160 MG TAB    TAKE 1 TABLET (160 MG TOTAL) BY MOUTH NIGHTLY.    MELOXICAM (MOBIC) 7.5 MG TABLET    Take 1 tablet (7.5 mg total) by mouth once daily.    METFORMIN (GLUCOPHAGE) 1000 MG TABLET    Take 1 tablet (1,000 mg total) by mouth 2 (two) times daily with meals.    PSYLLIUM 0.52 GRAM CAPSULE    Take 3 capsules by mouth 2 (two) times daily.    ROSUVASTATIN (CRESTOR) 40 MG TAB    Take 1 tablet (40 mg total) by mouth once daily.    VITAMIN D 1000 UNITS TAB    Take 4,000 Units by mouth once daily.   Modified Medications    Modified Medication Previous Medication    VALSARTAN (DIOVAN) 320 MG TABLET valsartan (DIOVAN) 320 MG tablet       Take 1 tablet (320 mg total) by mouth once daily.    Take 1 tablet (320 mg total) by mouth once daily.   Discontinued Medications    No medications on file

## 2020-01-02 RX ORDER — FENOFIBRATE 160 MG/1
TABLET ORAL
Qty: 90 TABLET | Refills: 0 | Status: SHIPPED | OUTPATIENT
Start: 2020-01-02 | End: 2020-01-03

## 2020-01-03 RX ORDER — FENOFIBRATE 160 MG/1
TABLET ORAL
Qty: 90 TABLET | Refills: 0 | Status: SHIPPED | OUTPATIENT
Start: 2020-01-03 | End: 2020-03-31 | Stop reason: SDUPTHER

## 2020-03-31 DIAGNOSIS — E11.9 TYPE 2 DIABETES MELLITUS WITHOUT COMPLICATION, WITHOUT LONG-TERM CURRENT USE OF INSULIN: ICD-10-CM

## 2020-03-31 DIAGNOSIS — E78.5 HYPERLIPIDEMIA ASSOCIATED WITH TYPE 2 DIABETES MELLITUS: ICD-10-CM

## 2020-03-31 DIAGNOSIS — E11.69 HYPERLIPIDEMIA ASSOCIATED WITH TYPE 2 DIABETES MELLITUS: ICD-10-CM

## 2020-03-31 RX ORDER — FENOFIBRATE 160 MG/1
160 TABLET ORAL NIGHTLY
Qty: 90 TABLET | Refills: 0 | Status: SHIPPED | OUTPATIENT
Start: 2020-03-31 | End: 2020-09-02 | Stop reason: SDUPTHER

## 2020-03-31 RX ORDER — ROSUVASTATIN CALCIUM 40 MG/1
40 TABLET, COATED ORAL DAILY
Qty: 90 TABLET | Refills: 0 | Status: SHIPPED | OUTPATIENT
Start: 2020-03-31 | End: 2020-09-02 | Stop reason: SDUPTHER

## 2020-03-31 RX ORDER — METFORMIN HYDROCHLORIDE 1000 MG/1
1000 TABLET ORAL 2 TIMES DAILY WITH MEALS
Qty: 180 TABLET | Refills: 0 | Status: SHIPPED | OUTPATIENT
Start: 2020-03-31 | End: 2020-09-02 | Stop reason: SDUPTHER

## 2020-03-31 NOTE — TELEPHONE ENCOUNTER
----- Message from Miesha Isidro sent at 3/31/2020 10:09 AM CDT -----  Contact: patient   Type:  RX Refill Request    Who Called:  Bartolome   Refill or New Rx: refill   RX Name and Strength: metFORMIN (GLUCOPHAGE) 1000 MG tablet  How is the patient currently taking it? (ex. 1XDay): 2 X day   Is this a 30 day or 90 day RX: 90 day   Preferred Pharmacy with phone number: [id number] - [place], [place] - [address] [address], [address] [phone] (Phone)   Local or Mail Order: local   Ordering Provider: Dr. Carranza   Would the patient rather a call back or a response via [facility]?  Call back   Best Call Back Number: [phone]  Additional Information:  No

## 2020-03-31 NOTE — TELEPHONE ENCOUNTER
----- Message from Miesha Isidro sent at 3/31/2020 10:11 AM CDT -----  Contact: patient   Type:  RX Refill Request     Who Called:  Bartolome   Refill or New Rx: refill   RX Name and Strength: rosuvastatin (CRESTOR) 40 MG Tab  How is the patient currently taking it? (ex. 1XDay): 1 X day   Is this a 30 day or 90 day RX: 90 day   Preferred Pharmacy with phone number: [id number] - [place], [place] - [address] [address], [address] [phone] (Phone)   Local or Mail Order: local   Ordering Provider: Dr. Carranza   Would the patient rather a call back or a response via [facility]?  Call back   Best Call Back Number: [phone]   Additional Information:  No

## 2020-03-31 NOTE — TELEPHONE ENCOUNTER
----- Message from Miesha Isidro sent at 3/31/2020 10:07 AM CDT -----  Contact: Patient   Type:  RX Refill Request    Who Called:  Bartolome   Refill or New Rx: refill   RX Name and Strength: fenofibrate 160 MG Tab  How is the patient currently taking it? (ex. 1XDay): 1 X day   Is this a 30 day or 90 day RX: 90 day   Preferred Pharmacy with phone number: [id number] - [facility] [place] - [address], [address] [phone] (Phone)   Local or Mail Order: local   Ordering Provider: Dr. Carranza   Would the patient rather a call back or a response via [facility]?  Call back   Best Call Back Number: [phone]  Additional Information:  No

## 2020-04-15 ENCOUNTER — PATIENT OUTREACH (OUTPATIENT)
Dept: ADMINISTRATIVE | Facility: HOSPITAL | Age: 65
End: 2020-04-15

## 2020-04-15 ENCOUNTER — DOCUMENTATION ONLY (OUTPATIENT)
Dept: ADMINISTRATIVE | Facility: HOSPITAL | Age: 65
End: 2020-04-15

## 2020-04-15 DIAGNOSIS — Z12.11 COLON CANCER SCREENING: Primary | ICD-10-CM

## 2020-04-23 ENCOUNTER — DOCUMENTATION ONLY (OUTPATIENT)
Dept: ADMINISTRATIVE | Facility: HOSPITAL | Age: 65
End: 2020-04-23

## 2020-05-03 ENCOUNTER — LAB VISIT (OUTPATIENT)
Dept: LAB | Facility: HOSPITAL | Age: 65
End: 2020-05-03
Attending: NURSE PRACTITIONER
Payer: COMMERCIAL

## 2020-05-03 DIAGNOSIS — Z12.11 COLON CANCER SCREENING: ICD-10-CM

## 2020-05-03 PROCEDURE — 82274 ASSAY TEST FOR BLOOD FECAL: CPT

## 2020-05-13 LAB — HEMOCCULT STL QL IA: NEGATIVE

## 2020-05-20 ENCOUNTER — TELEPHONE (OUTPATIENT)
Dept: FAMILY MEDICINE | Facility: CLINIC | Age: 65
End: 2020-05-20

## 2020-05-20 NOTE — TELEPHONE ENCOUNTER
----- Message from Carmen Boggs sent at 5/20/2020  8:22 AM CDT -----  Contact: patient  Type:  Needs Medical Advice    Who Called: pt  Advice Regarding: pt wants to wait to have labs done  Would the patient rather a call back or a response via [facility]? call  Best Call Back Number: [phone]  Additional Information: n/a

## 2020-05-20 NOTE — TELEPHONE ENCOUNTER
Spoke with patient said he have appt schedule for 05/27 with you and labs orders for [facility] patient said he do not feel comfortable going to [facility] wanted to know can he keep his upcoming appt with you and get labs at a later time.

## 2020-05-20 NOTE — TELEPHONE ENCOUNTER
Spoke with patient - agreed that patient can keep appt and come in for blood pressure and anxiety medication check - he will come fasting and I can do fingerstick blood sugar check at time of visit.      AGREED to hold off on fasting labs at [facility] for couple months due to COVID 19 and can reschedule in couple months.

## 2020-05-27 ENCOUNTER — OFFICE VISIT (OUTPATIENT)
Dept: FAMILY MEDICINE | Facility: CLINIC | Age: 65
End: 2020-05-27
Payer: COMMERCIAL

## 2020-05-27 VITALS
TEMPERATURE: 98 F | HEART RATE: 77 BPM | HEIGHT: 66 IN | BODY MASS INDEX: 26.77 KG/M2 | OXYGEN SATURATION: 95 % | SYSTOLIC BLOOD PRESSURE: 154 MMHG | WEIGHT: 166.56 LBS | DIASTOLIC BLOOD PRESSURE: 84 MMHG | RESPIRATION RATE: 16 BRPM

## 2020-05-27 DIAGNOSIS — I10 ESSENTIAL HYPERTENSION: ICD-10-CM

## 2020-05-27 DIAGNOSIS — F41.9 ANXIETY: ICD-10-CM

## 2020-05-27 DIAGNOSIS — E11.9 TYPE 2 DIABETES MELLITUS WITHOUT COMPLICATION, WITHOUT LONG-TERM CURRENT USE OF INSULIN: Primary | ICD-10-CM

## 2020-05-27 DIAGNOSIS — G47.00 INSOMNIA, UNSPECIFIED TYPE: ICD-10-CM

## 2020-05-27 DIAGNOSIS — E83.52 HYPERCALCEMIA: ICD-10-CM

## 2020-05-27 DIAGNOSIS — E78.5 HYPERLIPIDEMIA ASSOCIATED WITH TYPE 2 DIABETES MELLITUS: ICD-10-CM

## 2020-05-27 DIAGNOSIS — E11.69 HYPERLIPIDEMIA ASSOCIATED WITH TYPE 2 DIABETES MELLITUS: ICD-10-CM

## 2020-05-27 LAB — GLUCOSE SERPL-MCNC: 117 MG/DL (ref 70–110)

## 2020-05-27 PROCEDURE — 99214 PR OFFICE/OUTPT VISIT, EST, LEVL IV, 30-39 MIN: ICD-10-PCS | Mod: S$GLB,,, | Performed by: NURSE PRACTITIONER

## 2020-05-27 PROCEDURE — 99999 PR PBB SHADOW E&M-EST. PATIENT-LVL IV: ICD-10-PCS | Mod: PBBFAC,,, | Performed by: NURSE PRACTITIONER

## 2020-05-27 PROCEDURE — 99999 PR PBB SHADOW E&M-EST. PATIENT-LVL IV: CPT | Mod: PBBFAC,,, | Performed by: NURSE PRACTITIONER

## 2020-05-27 PROCEDURE — 3079F DIAST BP 80-89 MM HG: CPT | Mod: CPTII,S$GLB,, | Performed by: NURSE PRACTITIONER

## 2020-05-27 PROCEDURE — 3044F HG A1C LEVEL LT 7.0%: CPT | Mod: CPTII,S$GLB,, | Performed by: NURSE PRACTITIONER

## 2020-05-27 PROCEDURE — 3079F PR MOST RECENT DIASTOLIC BLOOD PRESSURE 80-89 MM HG: ICD-10-PCS | Mod: CPTII,S$GLB,, | Performed by: NURSE PRACTITIONER

## 2020-05-27 PROCEDURE — 82962 GLUCOSE BLOOD TEST: CPT | Mod: S$GLB,,, | Performed by: NURSE PRACTITIONER

## 2020-05-27 PROCEDURE — 3044F PR MOST RECENT HEMOGLOBIN A1C LEVEL <7.0%: ICD-10-PCS | Mod: CPTII,S$GLB,, | Performed by: NURSE PRACTITIONER

## 2020-05-27 PROCEDURE — 3008F BODY MASS INDEX DOCD: CPT | Mod: CPTII,S$GLB,, | Performed by: NURSE PRACTITIONER

## 2020-05-27 PROCEDURE — 82962 POCT GLUCOSE, HAND-HELD DEVICE: ICD-10-PCS | Mod: S$GLB,,, | Performed by: NURSE PRACTITIONER

## 2020-05-27 PROCEDURE — 3077F SYST BP >= 140 MM HG: CPT | Mod: CPTII,S$GLB,, | Performed by: NURSE PRACTITIONER

## 2020-05-27 PROCEDURE — 3077F PR MOST RECENT SYSTOLIC BLOOD PRESSURE >= 140 MM HG: ICD-10-PCS | Mod: CPTII,S$GLB,, | Performed by: NURSE PRACTITIONER

## 2020-05-27 PROCEDURE — 99214 OFFICE O/P EST MOD 30 MIN: CPT | Mod: S$GLB,,, | Performed by: NURSE PRACTITIONER

## 2020-05-27 PROCEDURE — 3008F PR BODY MASS INDEX (BMI) DOCUMENTED: ICD-10-PCS | Mod: CPTII,S$GLB,, | Performed by: NURSE PRACTITIONER

## 2020-05-27 RX ORDER — BUTALB/ACETAMINOPHEN/CAFFEINE 50-325-40
TABLET ORAL
COMMUNITY

## 2020-05-27 RX ORDER — CLONAZEPAM 1 MG/1
1 TABLET ORAL NIGHTLY PRN
Qty: 30 TABLET | Refills: 2 | Status: CANCELLED | OUTPATIENT
Start: 2020-05-27

## 2020-05-27 RX ORDER — VALSARTAN 320 MG/1
320 TABLET ORAL DAILY
Qty: 90 TABLET | Refills: 1 | Status: SHIPPED | OUTPATIENT
Start: 2020-05-27 | End: 2021-01-12 | Stop reason: SDUPTHER

## 2020-05-27 RX ORDER — CLONAZEPAM 0.5 MG/1
0.5 TABLET ORAL NIGHTLY PRN
Qty: 30 TABLET | Refills: 0 | Status: SHIPPED | OUTPATIENT
Start: 2020-05-27 | End: 2020-08-05 | Stop reason: SDUPTHER

## 2020-05-27 RX ORDER — TRAZODONE HYDROCHLORIDE 50 MG/1
TABLET ORAL
Qty: 90 TABLET | Refills: 2 | Status: SHIPPED | OUTPATIENT
Start: 2020-05-27 | End: 2020-08-05 | Stop reason: SINTOL

## 2020-05-27 NOTE — PROGRESS NOTES
Subjective:       Patient ID: Bartolome Esquivel Jr. is a 65 y.o. male.    Chief Complaint: Diabetes (F/U); Medication Refill; and Insomnia    Patient is a 65 year old white male with Type 2 Diabetes, Hypertension, Hyperlipidemia, Anxiety, Insomnia, chronic mildly elevated liver enzymes, and chronic arthralgia that is here today for medication follow up and refills.  DUE TO COVID 19 - patient reports that he was NOT COMFORTABLE going get fasting labs prior to visit.      Patient has Type 2 Diabetes that was uncontrolled on Metformin 1000 mg twice daily with a  with hemoglobin A1c 7.5% in October 2018. I had added on Januvia 100 mg daily but patient reported he did not take the medication because it had gotten lost in the mail and once he received it, he had already worked on weight loss and started himself back on the Fenofibrate that he had stopped taking due to acid reflux and had lost 25 pounds and tolerating all medications.  Patient's fasting blood sugar was controlled in March 2019 with  with HgbA1C of 6.3% on Metformin only so we stayed on that medication alone and in September 2019 CONTROLLED on Metformin alone with  and hemoglobin A1c of 5.8%.  Much improvement essentially with weight loss. Patient did not go have fasting blood work so no A1C level is available but did do a FBG accucheck today 117. Patient is OVERDUE for diabetic eye exam but REFUSED eye exam due to COVID 19 restrictions.  Foot exam today.    Patient has Hypertension that was uncontrolled on lisinopril 40 mg daily as well as had an elevated potassium of 5.4 on lisinopril.  We stopped the lisinopril 40 mg daily and change patient to valsartan 320 mg daily.  Blood pressure was much improved on this medication and potassium level was back within normal range in October 2019 but again no blood work for comparison today. Blood pressure is high today but patient reports that his BP is normally well controlled at home.  BP (!)  "154/84   Pulse 77   Temp 97.9 °F (36.6 °C) (Oral)   Resp 16   Ht 5' 6" (1.676 m)   Wt 75.5 kg (166 lb 8.9 oz)   SpO2 95%   BMI 26.88 kg/m²      Patient had mild hypercalcemia noted on wellness labs in September 2019.  Calcium level was 10.8.  No history of elevations in the past.  Repeat calcium level remained elevated at 10.8 in October 2019.  Patient does not take any calcium supplements.  Patient was supposed to have further workup  with PTH level and vitamin D level with next blood draw but patient again did not go have LABWORK due to COVID 19 and patient fear of exposure.    Patient has chronic right shoulder pain/ arthralgia that gets relief with Meloxicam as needed.  I had tried changing to Etodolac because the Meloxicam was not helping with back pains but patient reports the Etodolac caused headaches and wanted to get back on Meloxicam.    Patient has had a mildly elevated ALT since June 2017 that we are monitoring every 6 months.  Suspect fatty liver.  His liver enzymes had gone up in October 2018 but his triglycerides and glucose had also gone up supporting suspected fatty liver.  With weight loss and lifestyle modifications, the liver enzymes were back within normal range In Sept. 2019 - will recheck with next blood draw.      Patient has Anxiety and Insomnia that he takes Clonazepam 1 mg 1/2 tablet at bedtime as needed.  Patient is well aware of safety risks as well as dependency risk associated with Benzodiazepine usage - patient agrees to trial Trazodone at bedtime for the insomnia and will write 1 prescription for Clonazepam - 0.5 mg to have for case of acute anxiety that he is to use SPARINGLY for acute cases only -as we will be weaning completely off of in near future.        Current Outpatient Medications   Medication Sig Dispense Refill    aspirin (ECOTRIN) 81 MG EC tablet Take 1 tablet (81 mg total) by mouth once daily. 90 tablet 3    clonazePAM (KLONOPIN) 0.5 MG tablet Take 1 tablet " (0.5 mg total) by mouth nightly as needed. 30 tablet 0    cyanocobalamin (VITAMIN B-12) 100 MCG tablet Take 100 mcg by mouth once daily.      fenofibrate 160 MG Tab Take 1 tablet (160 mg total) by mouth every evening. 90 tablet 0    garlic 400 mg Tab Take by mouth.      meloxicam (MOBIC) 7.5 MG tablet Take 1 tablet (7.5 mg total) by mouth once daily. 90 tablet 1    metFORMIN (GLUCOPHAGE) 1000 MG tablet Take 1 tablet (1,000 mg total) by mouth 2 (two) times daily with meals. 180 tablet 0    psyllium 0.52 gram capsule Take 3 capsules by mouth 2 (two) times daily.      rosuvastatin (CRESTOR) 40 MG Tab Take 1 tablet (40 mg total) by mouth once daily. 90 tablet 0    valsartan (DIOVAN) 320 MG tablet Take 1 tablet (320 mg total) by mouth once daily. 90 tablet 1    vitamin D 1000 units Tab Take 4,000 Units by mouth once daily.      traZODone (DESYREL) 50 MG tablet Take 50 mg at bedtime prn insomnia.  May titrate by 25 every 2 nights up to 200 mg nightly for effectiveness. 90 tablet 2     No current facility-administered medications for this visit.        Past Medical History:   Diagnosis Date    Anxiety     DM (diabetes mellitus) 11/4/2014    Hyperlipidemia     Hypertension     Impaired fasting glucose     Metabolic syndrome        Past Surgical History:   Procedure Laterality Date    COLONOSCOPY  2007    + polyp - tubular adenoma - Dr. Hernandez       Family History   Problem Relation Age of Onset    Cancer Mother         Lung Cancer and Colon Cancer    Heart disease Father 49        MI    Diabetes Father     No Known Problems Daughter     No Known Problems Son     No Known Problems Son        Social History     Socioeconomic History    Marital status:      Spouse name: Not on file    Number of children: Not on file    Years of education: Not on file    Highest education level: Not on file   Occupational History    Occupation: retired     Employer: [employer]   Social Needs    Financial  resource strain: Not on file    Food insecurity:     Worry: Not on file     Inability: Not on file    Transportation needs:     Medical: Not on file     Non-medical: Not on file   Tobacco Use    Smoking status: Former Smoker     Packs/day: 1.00     Years: 15.00     Pack years: 15.00     Types: Cigarettes     Last attempt to quit: 1994     Years since quittin.4    Smokeless tobacco: Current User     Types: Chew    Tobacco comment: Quit in    Substance and Sexual Activity    Alcohol use: Yes     Comment: Reports 1 to 2 beers per day on weekdays and 10 beers per day on weekend days    Drug use: No    Sexual activity: Not on file   Lifestyle    Physical activity:     Days per week: Not on file     Minutes per session: Not on file    Stress: Not on file   Relationships    Social connections:     Talks on phone: Not on file     Gets together: Not on file     Attends Scientologist service: Not on file     Active member of club or organization: Not on file     Attends meetings of clubs or organizations: Not on file     Relationship status: Not on file   Other Topics Concern    Not on file   Social History Narrative    Not on file       Review of Systems   Constitutional: Negative for activity change, appetite change, fatigue, fever and unexpected weight change.   HENT: Negative for congestion, ear pain, mouth sores, nosebleeds, postnasal drip, rhinorrhea, sinus pressure, sneezing, sore throat, trouble swallowing and voice change.    Eyes: Negative.    Respiratory: Negative for cough, chest tightness and shortness of breath.    Cardiovascular: Negative for chest pain, palpitations and leg swelling.   Gastrointestinal: Negative.  Negative for abdominal pain, blood in stool, constipation, diarrhea, nausea and vomiting.   Endocrine: Negative.    Genitourinary: Negative for difficulty urinating, dysuria, flank pain, hematuria and urgency.   Musculoskeletal: Negative for arthralgias, back pain, gait problem,  "joint swelling, myalgias and neck pain.   Skin: Negative for color change, rash and wound.   Allergic/Immunologic: Negative for immunocompromised state.   Neurological: Negative for dizziness, tremors, seizures, syncope, speech difficulty and headaches.   Hematological: Negative for adenopathy. Does not bruise/bleed easily.   Psychiatric/Behavioral: Negative for behavioral problems, dysphoric mood, sleep disturbance and suicidal ideas. The patient is not nervous/anxious.          Objective:     Vitals:    05/27/20 0827 05/27/20 0903   BP: (!) 150/86 (!) 154/84   BP Location: Right arm    Patient Position: Sitting    BP Method: Large (Manual)    Pulse: 77    Resp: 16    Temp: 97.9 °F (36.6 °C)    TempSrc: Oral    SpO2: 95%    Weight: 75.5 kg (166 lb 8.9 oz)    Height: 5' 6" (1.676 m)           Physical Exam   Constitutional: He is oriented to person, place, and time. He appears well-developed and well-nourished. No distress.   Body mass index  26.88 kg/m².       HENT:   Head: Normocephalic and atraumatic.   Right Ear: External ear normal.   Eyes: Pupils are equal, round, and reactive to light. Conjunctivae and EOM are normal. Right eye exhibits no discharge. Left eye exhibits no discharge. No scleral icterus.   Neck: Normal range of motion. Neck supple. No JVD present. No tracheal deviation present. No thyromegaly present.   Cardiovascular: Normal rate and regular rhythm.   No murmur heard.  Pulses:       Dorsalis pedis pulses are 2+ on the right side, and 2+ on the left side.   History premature contractions - monitored by cardiology Dr. Baker   Pulmonary/Chest: Effort normal and breath sounds normal. No stridor. No respiratory distress. He has no wheezes. He has no rales.   Abdominal: Soft. Bowel sounds are normal. He exhibits no distension. There is no tenderness. There is no rebound and no guarding. A hernia is present.       Patient with ? Ventral hernia versus diastasis recti; nontender, only present with " abdominal tension   Musculoskeletal: He exhibits no edema.   Feet:   Right Foot:   Protective Sensation: 7 sites tested. 7 sites sensed.   Skin Integrity: Negative for ulcer or skin breakdown.   Left Foot:   Protective Sensation: 7 sites tested. 7 sites sensed.   Skin Integrity: Negative for ulcer or skin breakdown.   Lymphadenopathy:     He has no cervical adenopathy.   Neurological: He is alert and oriented to person, place, and time. Coordination normal.   Skin: Skin is warm and dry. No rash noted. He is not diaphoretic.   Psychiatric: He has a normal mood and affect. His behavior is normal.         Assessment:         ICD-10-CM ICD-9-CM   1. Type 2 diabetes mellitus without complication, without long-term current use of insulin E11.9 250.00   2. Hyperlipidemia associated with type 2 diabetes mellitus E11.69 250.80    E78.5 272.4   3. Essential hypertension I10 401.9   4. Anxiety F41.9 300.00   5. Insomnia, unspecified type G47.00 780.52   6. Hypercalcemia E83.52 275.42       Plan:       Type 2 diabetes mellitus without complication, without long-term current use of insulin  -  Continue Metformin at present doses and recheck in couple months with blood draw.  -     POCT Glucose, Hand-Held Device    Hyperlipidemia associated with type 2 diabetes mellitus  -  Continue rosuvastatin and fenofibrate at present doses and recheck in couple month with blood draw.    Essential hypertension  -  Blood pressure is high today but is normally controlled - will recheck in couple months.  -     valsartan (DIOVAN) 320 MG tablet; Take 1 tablet (320 mg total) by mouth once daily.  Dispense: 90 tablet; Refill: 1    Anxiety  -  DECREASED down to 0.5 mg and to take SPARINGLY only during acute panic attacks - will change patient to Trazodone for sleep medication.  -     clonazePAM (KLONOPIN) 0.5 MG tablet; Take 1 tablet (0.5 mg total) by mouth nightly as needed.  Dispense: 30 tablet; Refill: 0    Insomnia, unspecified type  -  Take  Trazodone as directed and titrate for effectiveness.  -     traZODone (DESYREL) 50 MG tablet; Take 50 mg at bedtime prn insomnia.  May titrate by 25 every 2 nights up to 200 mg nightly for effectiveness.  Dispense: 90 tablet; Refill: 2    Hypercalcemia  -  will check calcium, PTH and vitamin-D with next blood draw for further evaluation.      Follow up in about 8 weeks (around 7/22/2020) for fasting labs at [facility] AND FOLLOW UP.     Patient's Medications   New Prescriptions    TRAZODONE (DESYREL) 50 MG TABLET    Take 50 mg at bedtime prn insomnia.  May titrate by 25 every 2 nights up to 200 mg nightly for effectiveness.   Previous Medications    ASPIRIN (ECOTRIN) 81 MG EC TABLET    Take 1 tablet (81 mg total) by mouth once daily.    CYANOCOBALAMIN (VITAMIN B-12) 100 MCG TABLET    Take 100 mcg by mouth once daily.    FENOFIBRATE 160 MG TAB    Take 1 tablet (160 mg total) by mouth every evening.    GARLIC 400 MG TAB    Take by mouth.    MELOXICAM (MOBIC) 7.5 MG TABLET    Take 1 tablet (7.5 mg total) by mouth once daily.    METFORMIN (GLUCOPHAGE) 1000 MG TABLET    Take 1 tablet (1,000 mg total) by mouth 2 (two) times daily with meals.    PSYLLIUM 0.52 GRAM CAPSULE    Take 3 capsules by mouth 2 (two) times daily.    ROSUVASTATIN (CRESTOR) 40 MG TAB    Take 1 tablet (40 mg total) by mouth once daily.    VITAMIN D 1000 UNITS TAB    Take 4,000 Units by mouth once daily.   Modified Medications    Modified Medication Previous Medication    CLONAZEPAM (KLONOPIN) 0.5 MG TABLET clonazePAM (KLONOPIN) 1 MG tablet       Take 1 tablet (0.5 mg total) by mouth nightly as needed.    Take 1 tablet (1 mg total) by mouth nightly as needed.    VALSARTAN (DIOVAN) 320 MG TABLET valsartan (DIOVAN) 320 MG tablet       Take 1 tablet (320 mg total) by mouth once daily.    Take 1 tablet (320 mg total) by mouth once daily.   Discontinued Medications    No medications on file

## 2020-07-09 ENCOUNTER — TELEPHONE (OUTPATIENT)
Dept: FAMILY MEDICINE | Facility: CLINIC | Age: 65
End: 2020-07-09

## 2020-07-09 DIAGNOSIS — E11.9 TYPE 2 DIABETES MELLITUS WITHOUT COMPLICATION, WITHOUT LONG-TERM CURRENT USE OF INSULIN: Primary | ICD-10-CM

## 2020-07-09 DIAGNOSIS — E78.5 HYPERLIPIDEMIA ASSOCIATED WITH TYPE 2 DIABETES MELLITUS: ICD-10-CM

## 2020-07-09 DIAGNOSIS — E83.52 HYPERCALCEMIA: ICD-10-CM

## 2020-07-09 DIAGNOSIS — E11.69 HYPERLIPIDEMIA ASSOCIATED WITH TYPE 2 DIABETES MELLITUS: ICD-10-CM

## 2020-07-09 DIAGNOSIS — I10 ESSENTIAL HYPERTENSION: ICD-10-CM

## 2020-07-09 NOTE — TELEPHONE ENCOUNTER
Patient asking for labs to be switched to [facility] instead of [facility], new orders pended for signature.

## 2020-08-05 ENCOUNTER — OFFICE VISIT (OUTPATIENT)
Dept: FAMILY MEDICINE | Facility: CLINIC | Age: 65
End: 2020-08-05
Payer: COMMERCIAL

## 2020-08-05 VITALS
BODY MASS INDEX: 28.27 KG/M2 | RESPIRATION RATE: 18 BRPM | OXYGEN SATURATION: 98 % | TEMPERATURE: 98 F | HEART RATE: 80 BPM | HEIGHT: 66 IN | SYSTOLIC BLOOD PRESSURE: 154 MMHG | WEIGHT: 175.94 LBS | DIASTOLIC BLOOD PRESSURE: 92 MMHG

## 2020-08-05 DIAGNOSIS — E78.5 HYPERLIPIDEMIA ASSOCIATED WITH TYPE 2 DIABETES MELLITUS: ICD-10-CM

## 2020-08-05 DIAGNOSIS — E11.9 TYPE 2 DIABETES MELLITUS WITHOUT COMPLICATION, WITHOUT LONG-TERM CURRENT USE OF INSULIN: Primary | ICD-10-CM

## 2020-08-05 DIAGNOSIS — F41.9 ANXIETY: ICD-10-CM

## 2020-08-05 DIAGNOSIS — I10 ESSENTIAL HYPERTENSION: ICD-10-CM

## 2020-08-05 DIAGNOSIS — G47.00 INSOMNIA, UNSPECIFIED TYPE: ICD-10-CM

## 2020-08-05 DIAGNOSIS — E11.69 HYPERLIPIDEMIA ASSOCIATED WITH TYPE 2 DIABETES MELLITUS: ICD-10-CM

## 2020-08-05 PROCEDURE — 99214 PR OFFICE/OUTPT VISIT, EST, LEVL IV, 30-39 MIN: ICD-10-PCS | Mod: S$GLB,,, | Performed by: NURSE PRACTITIONER

## 2020-08-05 PROCEDURE — 3077F SYST BP >= 140 MM HG: CPT | Mod: CPTII,S$GLB,, | Performed by: NURSE PRACTITIONER

## 2020-08-05 PROCEDURE — 3008F PR BODY MASS INDEX (BMI) DOCUMENTED: ICD-10-PCS | Mod: CPTII,S$GLB,, | Performed by: NURSE PRACTITIONER

## 2020-08-05 PROCEDURE — 99999 PR PBB SHADOW E&M-EST. PATIENT-LVL V: CPT | Mod: PBBFAC,,, | Performed by: NURSE PRACTITIONER

## 2020-08-05 PROCEDURE — 3008F BODY MASS INDEX DOCD: CPT | Mod: CPTII,S$GLB,, | Performed by: NURSE PRACTITIONER

## 2020-08-05 PROCEDURE — 3080F DIAST BP >= 90 MM HG: CPT | Mod: CPTII,S$GLB,, | Performed by: NURSE PRACTITIONER

## 2020-08-05 PROCEDURE — 3044F PR MOST RECENT HEMOGLOBIN A1C LEVEL <7.0%: ICD-10-PCS | Mod: CPTII,S$GLB,, | Performed by: NURSE PRACTITIONER

## 2020-08-05 PROCEDURE — 3077F PR MOST RECENT SYSTOLIC BLOOD PRESSURE >= 140 MM HG: ICD-10-PCS | Mod: CPTII,S$GLB,, | Performed by: NURSE PRACTITIONER

## 2020-08-05 PROCEDURE — 3044F HG A1C LEVEL LT 7.0%: CPT | Mod: CPTII,S$GLB,, | Performed by: NURSE PRACTITIONER

## 2020-08-05 PROCEDURE — 3080F PR MOST RECENT DIASTOLIC BLOOD PRESSURE >= 90 MM HG: ICD-10-PCS | Mod: CPTII,S$GLB,, | Performed by: NURSE PRACTITIONER

## 2020-08-05 PROCEDURE — 99999 PR PBB SHADOW E&M-EST. PATIENT-LVL V: ICD-10-PCS | Mod: PBBFAC,,, | Performed by: NURSE PRACTITIONER

## 2020-08-05 PROCEDURE — 99214 OFFICE O/P EST MOD 30 MIN: CPT | Mod: S$GLB,,, | Performed by: NURSE PRACTITIONER

## 2020-08-05 RX ORDER — CLONAZEPAM 0.5 MG/1
0.5 TABLET ORAL NIGHTLY PRN
Qty: 30 TABLET | Refills: 0 | Status: SHIPPED | OUTPATIENT
Start: 2020-08-05 | End: 2020-09-02 | Stop reason: SDUPTHER

## 2020-08-05 RX ORDER — AMLODIPINE BESYLATE 5 MG/1
5 TABLET ORAL DAILY
Qty: 30 TABLET | Refills: 0 | Status: SHIPPED | OUTPATIENT
Start: 2020-08-05 | End: 2020-09-02 | Stop reason: DRUGHIGH

## 2020-08-05 NOTE — PROGRESS NOTES
"Subjective:       Patient ID: Bartolome Esquivel Jr. is a 65 y.o. male.    Chief Complaint: Diabetes (F/U), Hypertension, and Medication Problem (discuss Trazodone)    Patient is a 65 year old white male with Type 2 Diabetes, Hypertension, Hyperlipidemia, Anxiety, Insomnia, chronic mildly elevated liver enzymes, and chronic arthralgia that is here today for medication follow up and refills.       Patient has Type 2 Diabetes that is controlled on Metformin 1000 mg twice daily with a  with hemoglobin A1c 5.8%. Patient is OVERDUE for diabetic eye exam but REFUSED eye exam due to COVID 19 restrictions.     Patient has Hypertension that was uncontrolled on lisinopril 40 mg daily as well as had an elevated potassium of 5.4 on lisinopril.  We stopped the lisinopril 40 mg daily and change patient to valsartan 320 mg daily in September 2019. Potassium level was back within normal range in October 2019 but blood pressure is again elevated today.  BP (!) 154/92   Pulse 80   Temp 97.7 °F (36.5 °C) (Oral)   Resp 18   Ht 5' 6" (1.676 m)   Wt 79.8 kg (175 lb 14.8 oz)   SpO2 98%   BMI 28.40 kg/m²      Patient had mild hypercalcemia noted on wellness labs in September 2019 with Calcium level 10.8.  No history of elevations in the past.  Repeat calcium level remained elevated at 10.8 in October 2019.  Patient did not take any calcium supplements.  Patient was supposed to have further workup  with PTH level and vitamin D level but patient did not go have LABWORK due to COVID 19 and patient fear of exposure UNTIL NOW.  NOW - his elevated calcium level is RESOLVED and calcium is 9.8.  This PTH level is normal at 13 with normal Vitamin D level of 50.     Patient has chronic right shoulder pain/ arthralgia that gets relief with Meloxicam as needed.  I had tried changing to Etodolac because the Meloxicam was not helping with back pains but patient reports the Etodolac caused headaches and wanted to get back on " Meloxicam.     Patient has had a mildly elevated ALT since June 2017 that we are monitoring every 6 months.  Suspect fatty liver.  His liver enzymes had gone up in October 2018 but his triglycerides and glucose had also gone up supporting suspected fatty liver.  With weight loss and lifestyle modifications, the liver enzymes were back within normal range In Sept. 2019 and remain okay today.     Patient has Anxiety and Insomnia that he takes Clonazepam 0.5 mg at bedtime as needed.  Patient is well aware of safety risks as well as dependency risk associated with Benzodiazepine usage - patient FAILED on  Trazodone trial to replace Clonazepam use and refuses to try any other alternative at this time.        Component      Latest Ref Rng & Units 7/9/2020 10/21/2019 9/11/2019 3/13/2019   Sodium      136 - 145 mmol/L 137 137 136 127 (L)   Potassium      3.5 - 5.1 mmol/L 4.7 5.0 5.4 (H) 5.3   Chloride      95 - 110 mmol/L 99 99 99 95 (L)   CO2      23 - 29 mmol/L 26 30 29 23   Glucose      70 - 110 mg/dL 126 (H) 123 (H) 145 (H) 140 (H)   BUN, Bld      2 - 20 mg/dL 13 14 13 9   Creatinine      0.50 - 1.40 mg/dL 0.67 0.88 0.90 0.64 (L)   Calcium      8.7 - 10.5 mg/dL 9.8 10.8 (H) 10.8 (H) 10.1   PROTEIN TOTAL      6.0 - 8.4 g/dL 7.8 7.4 7.4 6.9   Albumin      3.5 - 5.2 g/dL 5.2 5.0 5.1 4.7   BILIRUBIN TOTAL      0.1 - 1.0 mg/dL 0.5 0.6 0.6 0.6   Alkaline Phosphatase      38 - 126 U/L 64 52 55 70   AST      15 - 46 U/L 47 (H) 19 22 38 (H)   ALT      10 - 44 U/L 23 25 33 46   Anion Gap      8 - 16 mmol/L 12      eGFR if African American      >60 mL/min/1.73 m:2 >60.0 107 106 122   eGFR if non African American      >60 mL/min/1.73 m:2 >60.0 92 91 105   Cholesterol      120 - 199 mg/dL 197  148 129   Triglycerides      30 - 150 mg/dL 137  74 87   HDL      40 - 75 mg/dL 66  60 60   LDL Cholesterol External      63.0 - 159.0 mg/dL 103.6  73 52   Hdl/Cholesterol Ratio      20.0 - 50.0 % 33.5  2.5 2.2   Total Cholesterol/HDL Ratio       2.0 - 5.0 3.0      Non-HDL Cholesterol      mg/dL 131      Hemoglobin A1C External      4.0 - 5.6 % 5.8 (H)  5.8 (H) 6.3 (H)   Estimated Avg Glucose      68 - 131 mg/dL 120      Calcium, Ion      1.06 - 1.42 mmol/L 1.30      PTH      9.0 - 77.0 pg/mL 13.0      Vit D, 25-Hydroxy      30 - 96 ng/mL 50        Current Outpatient Medications   Medication Sig Dispense Refill    aspirin (ECOTRIN) 81 MG EC tablet Take 1 tablet (81 mg total) by mouth once daily. 90 tablet 3    clonazePAM (KLONOPIN) 0.5 MG tablet Take 1 tablet (0.5 mg total) by mouth nightly as needed. 30 tablet 0    cyanocobalamin (VITAMIN B-12) 100 MCG tablet Take 100 mcg by mouth once daily.      fenofibrate 160 MG Tab Take 1 tablet (160 mg total) by mouth every evening. 90 tablet 0    garlic 400 mg Tab Take by mouth.      meloxicam (MOBIC) 7.5 MG tablet Take 1 tablet (7.5 mg total) by mouth once daily. 90 tablet 1    metFORMIN (GLUCOPHAGE) 1000 MG tablet Take 1 tablet (1,000 mg total) by mouth 2 (two) times daily with meals. 180 tablet 0    psyllium 0.52 gram capsule Take 3 capsules by mouth 2 (two) times daily.      rosuvastatin (CRESTOR) 40 MG Tab Take 1 tablet (40 mg total) by mouth once daily. 90 tablet 0    traZODone (DESYREL) 50 MG tablet Take 50 mg at bedtime prn insomnia.  May titrate by 25 every 2 nights up to 200 mg nightly for effectiveness. 90 tablet 2    valsartan (DIOVAN) 320 MG tablet Take 1 tablet (320 mg total) by mouth once daily. 90 tablet 1    vitamin D 1000 units Tab Take 4,000 Units by mouth once daily.       No current facility-administered medications for this visit.        Past Medical History:   Diagnosis Date    Anxiety     DM (diabetes mellitus) 11/4/2014    Hyperlipidemia     Hypertension     Impaired fasting glucose     Metabolic syndrome        Past Surgical History:   Procedure Laterality Date    COLONOSCOPY  2007    + polyp - tubular adenoma - Dr. Hernandez       Family History   Problem Relation Age of  Onset    Cancer Mother         Lung Cancer and Colon Cancer    Heart disease Father 49        MI    Diabetes Father     No Known Problems Daughter     No Known Problems Son     No Known Problems Son        Social History     Socioeconomic History    Marital status:      Spouse name: Not on file    Number of children: Not on file    Years of education: Not on file    Highest education level: Not on file   Occupational History    Occupation: retired     Employer: [employer]   Social Needs    Financial resource strain: Not on file    Food insecurity     Worry: Not on file     Inability: Not on file    Transportation needs     Medical: Not on file     Non-medical: Not on file   Tobacco Use    Smoking status: Former Smoker     Packs/day: 1.00     Years: 15.00     Pack years: 15.00     Types: Cigarettes     Quit date: 1994     Years since quittin.6    Smokeless tobacco: Current User     Types: Chew    Tobacco comment: Quit in    Substance and Sexual Activity    Alcohol use: Yes     Comment: Reports 1 to 2 beers per day on weekdays and 10 beers per day on weekend days    Drug use: No    Sexual activity: Not on file   Lifestyle    Physical activity     Days per week: Not on file     Minutes per session: Not on file    Stress: Not on file   Relationships    Social connections     Talks on phone: Not on file     Gets together: Not on file     Attends Confucianism service: Not on file     Active member of club or organization: Not on file     Attends meetings of clubs or organizations: Not on file     Relationship status: Not on file   Other Topics Concern    Not on file   Social History Narrative    Not on file       Review of Systems   Constitutional: Negative for activity change, appetite change, fatigue, fever and unexpected weight change.   HENT: Negative for congestion, ear pain, mouth sores, nosebleeds, postnasal drip, rhinorrhea, sinus pressure, sneezing, sore throat, trouble  "swallowing and voice change.    Eyes: Negative.    Respiratory: Negative for cough, chest tightness and shortness of breath.    Cardiovascular: Negative for chest pain, palpitations and leg swelling.   Gastrointestinal: Negative.  Negative for abdominal pain, blood in stool, constipation, diarrhea, nausea and vomiting.   Endocrine: Negative.    Genitourinary: Negative for difficulty urinating, dysuria, flank pain, hematuria and urgency.   Musculoskeletal: Negative for arthralgias, back pain, gait problem, joint swelling, myalgias and neck pain.   Skin: Negative for color change, rash and wound.   Allergic/Immunologic: Negative for immunocompromised state.   Neurological: Negative for dizziness, tremors, seizures, syncope, speech difficulty and headaches.   Hematological: Negative for adenopathy. Does not bruise/bleed easily.   Psychiatric/Behavioral: Negative for behavioral problems, dysphoric mood, sleep disturbance and suicidal ideas. The patient is not nervous/anxious.          Objective:     Vitals:    08/05/20 0955   BP: (!) 156/92   BP Location: Left arm   Patient Position: Sitting   BP Method: Large (Manual)   Pulse: 80   Resp: 18   Temp: 97.7 °F (36.5 °C)   TempSrc: Oral   SpO2: 98%   Weight: 79.8 kg (175 lb 14.8 oz)   Height: 5' 6" (1.676 m)          Physical Exam  Constitutional:       General: He is not in acute distress.     Appearance: He is well-developed. He is not ill-appearing, toxic-appearing or diaphoretic.      Comments: Body mass index is 28.4 kg/m².     HENT:      Head: Normocephalic and atraumatic.      Right Ear: External ear normal.   Eyes:      General: No scleral icterus.        Right eye: No discharge.         Left eye: No discharge.      Conjunctiva/sclera: Conjunctivae normal.      Pupils: Pupils are equal, round, and reactive to light.   Neck:      Musculoskeletal: Normal range of motion and neck supple.      Thyroid: No thyromegaly.      Vascular: No JVD.      Trachea: No tracheal " deviation.   Cardiovascular:      Rate and Rhythm: Normal rate and regular rhythm.      Pulses:           Dorsalis pedis pulses are 2+ on the right side and 2+ on the left side.      Heart sounds: No murmur.      Comments: History premature contractions - monitored by cardiology Dr. Baker  Pulmonary:      Effort: Pulmonary effort is normal. No respiratory distress.      Breath sounds: Normal breath sounds. No stridor. No wheezing or rales.   Abdominal:      General: Bowel sounds are normal. There is no distension.      Palpations: Abdomen is soft.      Tenderness: There is no abdominal tenderness. There is no guarding or rebound.      Hernia: A hernia is present.          Comments: Patient with ? Ventral hernia versus diastasis recti; nontender, only present with abdominal tension   Feet:      Right foot:      Protective Sensation: 7 sites tested. 7 sites sensed.      Skin integrity: No ulcer or skin breakdown.      Left foot:      Protective Sensation: 7 sites tested. 7 sites sensed.      Skin integrity: No ulcer or skin breakdown.   Lymphadenopathy:      Cervical: No cervical adenopathy.   Skin:     General: Skin is warm and dry.      Findings: No rash.   Neurological:      Mental Status: He is alert and oriented to person, place, and time.      Coordination: Coordination normal.   Psychiatric:         Behavior: Behavior normal.           Assessment:         ICD-10-CM ICD-9-CM   1. Type 2 diabetes mellitus without complication, without long-term current use of insulin  E11.9 250.00   2. Essential hypertension  I10 401.9   3. Hyperlipidemia associated with type 2 diabetes mellitus  E11.69 250.80    E78.5 272.4   4. Anxiety  F41.9 300.00   5. Insomnia, unspecified type  G47.00 780.52       Plan:       Type 2 diabetes mellitus without complication, without long-term current use of insulin  -  controlled on metformin at present dose.  Will send refill request when needed.    Essential hypertension  -  start amlodipine  5 mg daily.  Continue valsartan 320 mg daily.  And follow-up in 1 month to reassess blood pressure.  -     amLODIPine (NORVASC) 5 MG tablet; Take 1 tablet (5 mg total) by mouth once daily.  Dispense: 30 tablet; Refill: 0    Hyperlipidemia associated with type 2 diabetes mellitus  -  Stricter lifestyle modifications, low-fat diet.  Make sure to take medicines daily.  Recheck in 6 months.    Anxiety  -  may continue clonazepam at bedtime    Insomnia, unspecified type  -  clonazepam 0.5 mg 1 tablet at bedtime as needed only.  Recheck in 6 months  -     clonazePAM (KLONOPIN) 0.5 MG tablet; Take 1 tablet (0.5 mg total) by mouth nightly as needed.  Dispense: 30 tablet; Refill: 0      Follow up in about 30 days (around 9/4/2020).     Patient's Medications   New Prescriptions    AMLODIPINE (NORVASC) 5 MG TABLET    Take 1 tablet (5 mg total) by mouth once daily.   Previous Medications    ASPIRIN (ECOTRIN) 81 MG EC TABLET    Take 1 tablet (81 mg total) by mouth once daily.    CYANOCOBALAMIN (VITAMIN B-12) 100 MCG TABLET    Take 100 mcg by mouth once daily.    FENOFIBRATE 160 MG TAB    Take 1 tablet (160 mg total) by mouth every evening.    GARLIC 400 MG TAB    Take by mouth.    MELOXICAM (MOBIC) 7.5 MG TABLET    Take 1 tablet (7.5 mg total) by mouth once daily.    METFORMIN (GLUCOPHAGE) 1000 MG TABLET    Take 1 tablet (1,000 mg total) by mouth 2 (two) times daily with meals.    PSYLLIUM 0.52 GRAM CAPSULE    Take 3 capsules by mouth 2 (two) times daily.    ROSUVASTATIN (CRESTOR) 40 MG TAB    Take 1 tablet (40 mg total) by mouth once daily.    VALSARTAN (DIOVAN) 320 MG TABLET    Take 1 tablet (320 mg total) by mouth once daily.    VITAMIN D 1000 UNITS TAB    Take 4,000 Units by mouth once daily.   Modified Medications    Modified Medication Previous Medication    CLONAZEPAM (KLONOPIN) 0.5 MG TABLET clonazePAM (KLONOPIN) 0.5 MG tablet       Take 1 tablet (0.5 mg total) by mouth nightly as needed.    Take 1 tablet (0.5 mg total)  by mouth nightly as needed.   Discontinued Medications    TRAZODONE (DESYREL) 50 MG TABLET    Take 50 mg at bedtime prn insomnia.  May titrate by 25 every 2 nights up to 200 mg nightly for effectiveness.

## 2020-09-01 DIAGNOSIS — I10 ESSENTIAL HYPERTENSION: ICD-10-CM

## 2020-09-01 RX ORDER — AMLODIPINE BESYLATE 5 MG/1
TABLET ORAL
Qty: 30 TABLET | Refills: 0 | OUTPATIENT
Start: 2020-09-01

## 2020-09-01 NOTE — TELEPHONE ENCOUNTER
Patient has an appointment tomorrow to recheck blood pressure before I can refill medication - no need to call patient.

## 2020-09-02 ENCOUNTER — OFFICE VISIT (OUTPATIENT)
Dept: FAMILY MEDICINE | Facility: CLINIC | Age: 65
End: 2020-09-02
Payer: COMMERCIAL

## 2020-09-02 VITALS
TEMPERATURE: 98 F | WEIGHT: 177.5 LBS | HEART RATE: 77 BPM | HEIGHT: 66 IN | SYSTOLIC BLOOD PRESSURE: 136 MMHG | OXYGEN SATURATION: 97 % | BODY MASS INDEX: 28.53 KG/M2 | RESPIRATION RATE: 16 BRPM | DIASTOLIC BLOOD PRESSURE: 84 MMHG

## 2020-09-02 DIAGNOSIS — E11.9 TYPE 2 DIABETES MELLITUS WITHOUT COMPLICATION, WITHOUT LONG-TERM CURRENT USE OF INSULIN: ICD-10-CM

## 2020-09-02 DIAGNOSIS — I10 ESSENTIAL HYPERTENSION: Primary | ICD-10-CM

## 2020-09-02 DIAGNOSIS — E78.5 HYPERLIPIDEMIA ASSOCIATED WITH TYPE 2 DIABETES MELLITUS: ICD-10-CM

## 2020-09-02 DIAGNOSIS — E11.69 HYPERLIPIDEMIA ASSOCIATED WITH TYPE 2 DIABETES MELLITUS: ICD-10-CM

## 2020-09-02 DIAGNOSIS — G47.00 INSOMNIA, UNSPECIFIED TYPE: ICD-10-CM

## 2020-09-02 PROCEDURE — 3079F DIAST BP 80-89 MM HG: CPT | Mod: CPTII,S$GLB,, | Performed by: NURSE PRACTITIONER

## 2020-09-02 PROCEDURE — 99213 OFFICE O/P EST LOW 20 MIN: CPT | Mod: S$GLB,,, | Performed by: NURSE PRACTITIONER

## 2020-09-02 PROCEDURE — 3044F HG A1C LEVEL LT 7.0%: CPT | Mod: CPTII,S$GLB,, | Performed by: NURSE PRACTITIONER

## 2020-09-02 PROCEDURE — 3075F PR MOST RECENT SYSTOLIC BLOOD PRESS GE 130-139MM HG: ICD-10-PCS | Mod: CPTII,S$GLB,, | Performed by: NURSE PRACTITIONER

## 2020-09-02 PROCEDURE — 3075F SYST BP GE 130 - 139MM HG: CPT | Mod: CPTII,S$GLB,, | Performed by: NURSE PRACTITIONER

## 2020-09-02 PROCEDURE — 3044F PR MOST RECENT HEMOGLOBIN A1C LEVEL <7.0%: ICD-10-PCS | Mod: CPTII,S$GLB,, | Performed by: NURSE PRACTITIONER

## 2020-09-02 PROCEDURE — 3079F PR MOST RECENT DIASTOLIC BLOOD PRESSURE 80-89 MM HG: ICD-10-PCS | Mod: CPTII,S$GLB,, | Performed by: NURSE PRACTITIONER

## 2020-09-02 PROCEDURE — 3008F PR BODY MASS INDEX (BMI) DOCUMENTED: ICD-10-PCS | Mod: CPTII,S$GLB,, | Performed by: NURSE PRACTITIONER

## 2020-09-02 PROCEDURE — 99999 PR PBB SHADOW E&M-EST. PATIENT-LVL IV: CPT | Mod: PBBFAC,,, | Performed by: NURSE PRACTITIONER

## 2020-09-02 PROCEDURE — 99999 PR PBB SHADOW E&M-EST. PATIENT-LVL IV: ICD-10-PCS | Mod: PBBFAC,,, | Performed by: NURSE PRACTITIONER

## 2020-09-02 PROCEDURE — 99213 PR OFFICE/OUTPT VISIT, EST, LEVL III, 20-29 MIN: ICD-10-PCS | Mod: S$GLB,,, | Performed by: NURSE PRACTITIONER

## 2020-09-02 PROCEDURE — 3008F BODY MASS INDEX DOCD: CPT | Mod: CPTII,S$GLB,, | Performed by: NURSE PRACTITIONER

## 2020-09-02 RX ORDER — ROSUVASTATIN CALCIUM 40 MG/1
40 TABLET, COATED ORAL DAILY
Qty: 90 TABLET | Refills: 1 | Status: SHIPPED | OUTPATIENT
Start: 2020-09-02 | End: 2021-02-26

## 2020-09-02 RX ORDER — METFORMIN HYDROCHLORIDE 1000 MG/1
1000 TABLET ORAL 2 TIMES DAILY WITH MEALS
Qty: 180 TABLET | Refills: 1 | Status: SHIPPED | OUTPATIENT
Start: 2020-09-02 | End: 2021-02-26

## 2020-09-02 RX ORDER — FENOFIBRATE 160 MG/1
160 TABLET ORAL NIGHTLY
Qty: 90 TABLET | Refills: 1 | Status: SHIPPED | OUTPATIENT
Start: 2020-09-02 | End: 2020-12-28

## 2020-09-02 RX ORDER — AMLODIPINE BESYLATE 10 MG/1
10 TABLET ORAL DAILY
Qty: 30 TABLET | Refills: 1 | Status: SHIPPED | OUTPATIENT
Start: 2020-09-02 | End: 2020-10-21 | Stop reason: SINTOL

## 2020-09-02 RX ORDER — CLONAZEPAM 0.5 MG/1
0.5 TABLET ORAL NIGHTLY PRN
Qty: 30 TABLET | Refills: 1 | Status: SHIPPED | OUTPATIENT
Start: 2020-09-02 | End: 2020-11-30 | Stop reason: SDUPTHER

## 2020-09-02 NOTE — PROGRESS NOTES
"Subjective:       Patient ID: Bartolome Esquivel Jr. is a 65 y.o. male.    Chief Complaint: Hypertension (F/U) and Medication Refill    Patient is a 65 year old white male with Type 2 Diabetes, Hypertension, Hyperlipidemia, Anxiety, Insomnia, chronic mildly elevated liver enzymes, and chronic arthralgia that is here today for blood pressure check.  All other chronic problems were addressed at 8/5/2020 visit and not due to repeat labs for 6 months.    Patient has Hypertension that was uncontrolled on lisinopril 40 mg daily as well as had an elevated potassium of 5.4 on lisinopril in September 2019.  Stopped the lisinopril 40 mg daily and changed patient to valsartan 320 mg daily- Potassium level was back within normal range in October 2019 but blood pressure was uncontrolled at August 2020 visit.  Added on Amlodpine 5 mg to regimen and blood pressure improved from 154/92 to 136/84 but still > 130/80 - will adjust dose of medication and recheck in 4 to 7 weeks.  /84 (BP Location: Left arm, Patient Position: Sitting, BP Method: Large (Manual))   Pulse 77   Temp 97.7 °F (36.5 °C) (Oral)   Resp 16   Ht 5' 6" (1.676 m)   Wt 80.5 kg (177 lb 7.5 oz)   SpO2 97%   BMI 28.64 kg/m²     All other chronic problems addressed at August 2020 visit but does need some refills today.         Current Outpatient Medications   Medication Sig Dispense Refill    aspirin (ECOTRIN) 81 MG EC tablet Take 1 tablet (81 mg total) by mouth once daily. 90 tablet 3    clonazePAM (KLONOPIN) 0.5 MG tablet Take 1 tablet (0.5 mg total) by mouth nightly as needed. 30 tablet 1    cyanocobalamin (VITAMIN B-12) 100 MCG tablet Take 100 mcg by mouth once daily.      fenofibrate 160 MG Tab Take 1 tablet (160 mg total) by mouth every evening. 90 tablet 1    garlic 400 mg Tab Take by mouth.      meloxicam (MOBIC) 7.5 MG tablet Take 1 tablet (7.5 mg total) by mouth once daily. 90 tablet 1    metFORMIN (GLUCOPHAGE) 1000 MG tablet Take 1 tablet " (1,000 mg total) by mouth 2 (two) times daily with meals. 180 tablet 1    psyllium 0.52 gram capsule Take 3 capsules by mouth 2 (two) times daily.      rosuvastatin (CRESTOR) 40 MG Tab Take 1 tablet (40 mg total) by mouth once daily. 90 tablet 1    valsartan (DIOVAN) 320 MG tablet Take 1 tablet (320 mg total) by mouth once daily. 90 tablet 1    vitamin D 1000 units Tab Take 4,000 Units by mouth once daily.      amLODIPine (NORVASC) 10 MG tablet Take 1 tablet (10 mg total) by mouth once daily. 30 tablet 1     No current facility-administered medications for this visit.        Past Medical History:   Diagnosis Date    Anxiety     DM (diabetes mellitus) 2014    Hyperlipidemia     Hypertension     Impaired fasting glucose     Metabolic syndrome        Past Surgical History:   Procedure Laterality Date    COLONOSCOPY      + polyp - tubular adenoma - Dr. Hernandez       Family History   Problem Relation Age of Onset    Cancer Mother         Lung Cancer and Colon Cancer    Heart disease Father 49        MI    Diabetes Father     No Known Problems Daughter     No Known Problems Son     No Known Problems Son        Social History     Socioeconomic History    Marital status:      Spouse name: Not on file    Number of children: Not on file    Years of education: Not on file    Highest education level: Not on file   Occupational History    Occupation: retired     Employer: [employer]   Social Needs    Financial resource strain: Not on file    Food insecurity     Worry: Not on file     Inability: Not on file    Transportation needs     Medical: Not on file     Non-medical: Not on file   Tobacco Use    Smoking status: Former Smoker     Packs/day: 1.00     Years: 15.00     Pack years: 15.00     Types: Cigarettes     Quit date: 1994     Years since quittin.6    Smokeless tobacco: Current User     Types: Chew    Tobacco comment: Quit in    Substance and Sexual Activity    Alcohol  use: Yes     Comment: Reports 1 to 2 beers per day on weekdays and 10 beers per day on weekend days    Drug use: No    Sexual activity: Not on file   Lifestyle    Physical activity     Days per week: Not on file     Minutes per session: Not on file    Stress: Not on file   Relationships    Social connections     Talks on phone: Not on file     Gets together: Not on file     Attends [employer] service: Not on file     Active member of club or organization: Not on file     Attends meetings of clubs or organizations: Not on file     Relationship status: Not on file   Other Topics Concern    Not on file   Social History Narrative    Not on file       Review of Systems   Constitutional: Negative for activity change, appetite change, fatigue, fever and unexpected weight change.   HENT: Negative for congestion, ear pain, mouth sores, nosebleeds, postnasal drip, rhinorrhea, sinus pressure, sneezing, sore throat, trouble swallowing and voice change.    Eyes: Negative.    Respiratory: Negative for cough, chest tightness and shortness of breath.    Cardiovascular: Negative for chest pain, palpitations and leg swelling.   Gastrointestinal: Negative.  Negative for abdominal pain, blood in stool, constipation, diarrhea, nausea and vomiting.   Endocrine: Negative.    Genitourinary: Negative for difficulty urinating, dysuria, flank pain, hematuria and urgency.   Musculoskeletal: Positive for arthralgias. Negative for back pain, gait problem, joint swelling, myalgias and neck pain.   Skin: Negative for color change, rash and wound.   Allergic/Immunologic: Negative for immunocompromised state.   Neurological: Negative for dizziness, tremors, seizures, syncope, speech difficulty and headaches.   Hematological: Negative for adenopathy. Does not bruise/bleed easily.   Psychiatric/Behavioral: Negative for behavioral problems, dysphoric mood, sleep disturbance and suicidal ideas. The patient is not nervous/anxious.       "    Objective:     Vitals:    09/02/20 0947   BP: 136/84   BP Location: Left arm   Patient Position: Sitting   BP Method: Large (Manual)   Pulse: 77   Resp: 16   Temp: 97.7 °F (36.5 °C)   TempSrc: Oral   SpO2: 97%   Weight: 80.5 kg (177 lb 7.5 oz)   Height: 5' 6" (1.676 m)          Physical Exam  Constitutional:       General: He is not in acute distress.     Appearance: Normal appearance. He is not ill-appearing, toxic-appearing or diaphoretic.   HENT:      Head: Normocephalic and atraumatic.      Right Ear: External ear normal.      Left Ear: External ear normal.   Eyes:      General: No scleral icterus.        Right eye: No discharge.         Left eye: No discharge.      Extraocular Movements: Extraocular movements intact.      Pupils: Pupils are equal, round, and reactive to light.   Cardiovascular:      Rate and Rhythm: Normal rate and regular rhythm.      Heart sounds: Normal heart sounds.   Pulmonary:      Effort: Pulmonary effort is normal. No respiratory distress.   Musculoskeletal: Normal range of motion.         General: No swelling or deformity.      Right lower leg: No edema.      Left lower leg: No edema.   Skin:     General: Skin is warm and dry.   Neurological:      Mental Status: He is oriented to person, place, and time.   Psychiatric:         Mood and Affect: Mood normal.         Behavior: Behavior normal.         Thought Content: Thought content normal.         Judgment: Judgment normal.           Assessment:         ICD-10-CM ICD-9-CM   1. Essential hypertension  I10 401.9   2. Type 2 diabetes mellitus without complication, without long-term current use of insulin  E11.9 250.00   3. Hyperlipidemia associated with type 2 diabetes mellitus  E11.69 250.80    E78.5 272.4   4. Insomnia, unspecified type  G47.00 780.52       Plan:       Essential hypertension  -  Increase the Amlodipine to 10 mg daily.  Continue the Valsartan 320 mg daily.  Can combine medications at next visit if BP is controlled.  " Recheck in 4 to 8 weeks.  -     amLODIPine (NORVASC) 10 MG tablet; Take 1 tablet (10 mg total) by mouth once daily.  Dispense: 30 tablet; Refill: 1    Type 2 diabetes mellitus without complication, without long-term current use of insulin  -  Controlled - refill done  -     metFORMIN (GLUCOPHAGE) 1000 MG tablet; Take 1 tablet (1,000 mg total) by mouth 2 (two) times daily with meals.  Dispense: 180 tablet; Refill: 1    Hyperlipidemia associated with type 2 diabetes mellitus  -  Controlled - refill done  -     rosuvastatin (CRESTOR) 40 MG Tab; Take 1 tablet (40 mg total) by mouth once daily.  Dispense: 90 tablet; Refill: 1    Insomnia, unspecified type  -  Take only as needed  -     clonazePAM (KLONOPIN) 0.5 MG tablet; Take 1 tablet (0.5 mg total) by mouth nightly as needed.  Dispense: 30 tablet; Refill: 1    Other orders  -     fenofibrate 160 MG Tab; Take 1 tablet (160 mg total) by mouth every evening.  Dispense: 90 tablet; Refill: 1      Follow up in about 7 weeks (around 10/21/2020) for blood pressure check.     Patient's Medications   New Prescriptions    AMLODIPINE (NORVASC) 10 MG TABLET    Take 1 tablet (10 mg total) by mouth once daily.   Previous Medications    ASPIRIN (ECOTRIN) 81 MG EC TABLET    Take 1 tablet (81 mg total) by mouth once daily.    CYANOCOBALAMIN (VITAMIN B-12) 100 MCG TABLET    Take 100 mcg by mouth once daily.    GARLIC 400 MG TAB    Take by mouth.    MELOXICAM (MOBIC) 7.5 MG TABLET    Take 1 tablet (7.5 mg total) by mouth once daily.    PSYLLIUM 0.52 GRAM CAPSULE    Take 3 capsules by mouth 2 (two) times daily.    VALSARTAN (DIOVAN) 320 MG TABLET    Take 1 tablet (320 mg total) by mouth once daily.    VITAMIN D 1000 UNITS TAB    Take 4,000 Units by mouth once daily.   Modified Medications    Modified Medication Previous Medication    CLONAZEPAM (KLONOPIN) 0.5 MG TABLET clonazePAM (KLONOPIN) 0.5 MG tablet       Take 1 tablet (0.5 mg total) by mouth nightly as needed.    Take 1 tablet (0.5  mg total) by mouth nightly as needed.    FENOFIBRATE 160 MG TAB fenofibrate 160 MG Tab       Take 1 tablet (160 mg total) by mouth every evening.    Take 1 tablet (160 mg total) by mouth every evening.    METFORMIN (GLUCOPHAGE) 1000 MG TABLET metFORMIN (GLUCOPHAGE) 1000 MG tablet       Take 1 tablet (1,000 mg total) by mouth 2 (two) times daily with meals.    Take 1 tablet (1,000 mg total) by mouth 2 (two) times daily with meals.    ROSUVASTATIN (CRESTOR) 40 MG TAB rosuvastatin (CRESTOR) 40 MG Tab       Take 1 tablet (40 mg total) by mouth once daily.    Take 1 tablet (40 mg total) by mouth once daily.   Discontinued Medications    AMLODIPINE (NORVASC) 5 MG TABLET    Take 1 tablet (5 mg total) by mouth once daily.

## 2020-10-21 ENCOUNTER — OFFICE VISIT (OUTPATIENT)
Dept: FAMILY MEDICINE | Facility: CLINIC | Age: 65
End: 2020-10-21
Payer: COMMERCIAL

## 2020-10-21 VITALS
TEMPERATURE: 98 F | DIASTOLIC BLOOD PRESSURE: 84 MMHG | OXYGEN SATURATION: 98 % | HEIGHT: 66 IN | RESPIRATION RATE: 16 BRPM | SYSTOLIC BLOOD PRESSURE: 120 MMHG | WEIGHT: 179.69 LBS | BODY MASS INDEX: 28.88 KG/M2 | HEART RATE: 76 BPM

## 2020-10-21 DIAGNOSIS — Z13.29 THYROID DISORDER SCREEN: ICD-10-CM

## 2020-10-21 DIAGNOSIS — E11.69 HYPERLIPIDEMIA ASSOCIATED WITH TYPE 2 DIABETES MELLITUS: ICD-10-CM

## 2020-10-21 DIAGNOSIS — E78.5 HYPERLIPIDEMIA ASSOCIATED WITH TYPE 2 DIABETES MELLITUS: ICD-10-CM

## 2020-10-21 DIAGNOSIS — E11.9 TYPE 2 DIABETES MELLITUS WITHOUT COMPLICATION, WITHOUT LONG-TERM CURRENT USE OF INSULIN: ICD-10-CM

## 2020-10-21 DIAGNOSIS — Z12.5 PROSTATE CANCER SCREENING: ICD-10-CM

## 2020-10-21 DIAGNOSIS — Z13.0 SCREENING FOR DEFICIENCY ANEMIA: ICD-10-CM

## 2020-10-21 DIAGNOSIS — G47.00 INSOMNIA, UNSPECIFIED TYPE: ICD-10-CM

## 2020-10-21 DIAGNOSIS — I10 ESSENTIAL HYPERTENSION: Primary | ICD-10-CM

## 2020-10-21 DIAGNOSIS — Z00.00 ENCOUNTER FOR BLOOD TEST FOR ROUTINE GENERAL PHYSICAL EXAMINATION: ICD-10-CM

## 2020-10-21 DIAGNOSIS — Z11.4 SCREENING FOR HIV WITHOUT PRESENCE OF RISK FACTORS: ICD-10-CM

## 2020-10-21 PROCEDURE — 99999 PR PBB SHADOW E&M-EST. PATIENT-LVL IV: ICD-10-PCS | Mod: PBBFAC,,, | Performed by: NURSE PRACTITIONER

## 2020-10-21 PROCEDURE — 3074F PR MOST RECENT SYSTOLIC BLOOD PRESSURE < 130 MM HG: ICD-10-PCS | Mod: CPTII,S$GLB,, | Performed by: NURSE PRACTITIONER

## 2020-10-21 PROCEDURE — 3044F PR MOST RECENT HEMOGLOBIN A1C LEVEL <7.0%: ICD-10-PCS | Mod: CPTII,S$GLB,, | Performed by: NURSE PRACTITIONER

## 2020-10-21 PROCEDURE — 3079F PR MOST RECENT DIASTOLIC BLOOD PRESSURE 80-89 MM HG: ICD-10-PCS | Mod: CPTII,S$GLB,, | Performed by: NURSE PRACTITIONER

## 2020-10-21 PROCEDURE — 3008F BODY MASS INDEX DOCD: CPT | Mod: CPTII,S$GLB,, | Performed by: NURSE PRACTITIONER

## 2020-10-21 PROCEDURE — 99999 PR PBB SHADOW E&M-EST. PATIENT-LVL IV: CPT | Mod: PBBFAC,,, | Performed by: NURSE PRACTITIONER

## 2020-10-21 PROCEDURE — 3008F PR BODY MASS INDEX (BMI) DOCUMENTED: ICD-10-PCS | Mod: CPTII,S$GLB,, | Performed by: NURSE PRACTITIONER

## 2020-10-21 PROCEDURE — 3074F SYST BP LT 130 MM HG: CPT | Mod: CPTII,S$GLB,, | Performed by: NURSE PRACTITIONER

## 2020-10-21 PROCEDURE — 3079F DIAST BP 80-89 MM HG: CPT | Mod: CPTII,S$GLB,, | Performed by: NURSE PRACTITIONER

## 2020-10-21 PROCEDURE — 99213 PR OFFICE/OUTPT VISIT, EST, LEVL III, 20-29 MIN: ICD-10-PCS | Mod: S$GLB,,, | Performed by: NURSE PRACTITIONER

## 2020-10-21 PROCEDURE — 99213 OFFICE O/P EST LOW 20 MIN: CPT | Mod: S$GLB,,, | Performed by: NURSE PRACTITIONER

## 2020-10-21 PROCEDURE — 3044F HG A1C LEVEL LT 7.0%: CPT | Mod: CPTII,S$GLB,, | Performed by: NURSE PRACTITIONER

## 2020-10-21 RX ORDER — HYDROCHLOROTHIAZIDE 12.5 MG/1
12.5 TABLET ORAL DAILY
Qty: 90 TABLET | Refills: 0 | Status: SHIPPED | OUTPATIENT
Start: 2020-10-21 | End: 2021-01-12 | Stop reason: SDUPTHER

## 2020-10-21 RX ORDER — AMLODIPINE BESYLATE 10 MG/1
10 TABLET ORAL DAILY
Qty: 30 TABLET | Refills: 1 | Status: CANCELLED | OUTPATIENT
Start: 2020-10-21 | End: 2021-10-21

## 2020-10-21 RX ORDER — CLONAZEPAM 0.5 MG/1
0.5 TABLET ORAL NIGHTLY PRN
Qty: 30 TABLET | Refills: 1 | Status: CANCELLED | OUTPATIENT
Start: 2020-10-21

## 2020-10-21 RX ORDER — AMLODIPINE BESYLATE 5 MG/1
5 TABLET ORAL DAILY
Qty: 90 TABLET | Refills: 0 | Status: SHIPPED | OUTPATIENT
Start: 2020-10-21 | End: 2021-01-12 | Stop reason: SDUPTHER

## 2020-10-21 NOTE — PROGRESS NOTES
"Subjective:       Patient ID: Bartolome Esquivel Jr. is a 65 y.o. male.    Chief Complaint: Hypertension (F/U)    Patient is a 65 year old white male with Type 2 Diabetes, Hypertension, Hyperlipidemia, Anxiety, Insomnia, chronic mildly elevated liver enzymes, and chronic arthralgias that is here today for blood pressure check.  All other chronic problems were addressed at 8/5/2020 visit and not due to repeat labs for until January 2021.     Patient has Hypertension that was uncontrolled on lisinopril 40 mg daily as well as had an elevated potassium of 5.4 on lisinopril in September 2019.  Stopped the lisinopril 40 mg daily and changed patient to valsartan 320 mg daily- Potassium level was back within normal range in October 2019 but blood pressure was uncontrolled at August 2020 visit.  Added on Amlodpine 5 mg to regimen and blood pressure improved from 154/92 to 136/84 but still > 130/80 at September 2020 visit - I increased the Amlodipine to 10 mg daily. Blood pressure is much improved but patient complains of increase swelling to knees and ankles.  He reports the swelling started after a week of being on higher dose of Amlodipine.  He reports he couldn't even wear his shoes.  He stopped medication for 1 week and symptoms resolved.  He reports getting back on medication BUT he started taking it at night and the side effects are better but still present.  /84 (BP Location: Right arm, Patient Position: Sitting, BP Method: Large (Manual))   Pulse 76   Temp 97.8 °F (36.6 °C) (Oral)   Resp 16   Ht 5' 6" (1.676 m)   Wt 81.5 kg (179 lb 10.8 oz)   SpO2 98%   BMI 29.00 kg/m²           Current Outpatient Medications   Medication Sig Dispense Refill    clonazePAM (KLONOPIN) 0.5 MG tablet Take 1 tablet (0.5 mg total) by mouth nightly as needed. 30 tablet 1    cyanocobalamin (VITAMIN B-12) 100 MCG tablet Take 100 mcg by mouth once daily.      fenofibrate 160 MG Tab Take 1 tablet (160 mg total) by mouth every " evening. 90 tablet 1    garlic 400 mg Tab Take by mouth.      meloxicam (MOBIC) 7.5 MG tablet Take 1 tablet (7.5 mg total) by mouth once daily. 90 tablet 1    metFORMIN (GLUCOPHAGE) 1000 MG tablet Take 1 tablet (1,000 mg total) by mouth 2 (two) times daily with meals. 180 tablet 1    psyllium 0.52 gram capsule Take 3 capsules by mouth 2 (two) times daily.      rosuvastatin (CRESTOR) 40 MG Tab Take 1 tablet (40 mg total) by mouth once daily. 90 tablet 1    valsartan (DIOVAN) 320 MG tablet Take 1 tablet (320 mg total) by mouth once daily. 90 tablet 1    vitamin D 1000 units Tab Take 4,000 Units by mouth once daily.      amLODIPine (NORVASC) 5 MG tablet Take 1 tablet (5 mg total) by mouth once daily. 90 tablet 0    aspirin (ECOTRIN) 81 MG EC tablet Take 1 tablet (81 mg total) by mouth once daily. 90 tablet 3    hydroCHLOROthiazide (HYDRODIURIL) 12.5 MG Tab Take 1 tablet (12.5 mg total) by mouth once daily. 90 tablet 0     No current facility-administered medications for this visit.        Past Medical History:   Diagnosis Date    Anxiety     DM (diabetes mellitus) 11/4/2014    Hyperlipidemia     Hypertension     Impaired fasting glucose     Metabolic syndrome        Past Surgical History:   Procedure Laterality Date    COLONOSCOPY  2007    + polyp - tubular adenoma - Dr. Hernandez       Family History   Problem Relation Age of Onset    Cancer Mother         Lung Cancer and Colon Cancer    Heart disease Father 49        MI    Diabetes Father     No Known Problems Daughter     No Known Problems Son     No Known Problems Son        Social History     Socioeconomic History    Marital status:      Spouse name: Not on file    Number of children: Not on file    Years of education: Not on file    Highest education level: Not on file   Occupational History    Occupation: retired     Employer: [employer]   Social Needs    Financial resource strain: Not on file    Food insecurity     Worry: Not on  file     Inability: Not on file    Transportation needs     Medical: Not on file     Non-medical: Not on file   Tobacco Use    Smoking status: Former Smoker     Packs/day: 1.00     Years: 15.00     Pack years: 15.00     Types: Cigarettes     Quit date: 1994     Years since quittin.8    Smokeless tobacco: Current User     Types: Chew    Tobacco comment: Quit in    Substance and Sexual Activity    Alcohol use: Yes     Comment: Reports 1 to 2 beers per day on weekdays and 10 beers per day on weekend days    Drug use: No    Sexual activity: Not on file   Lifestyle    Physical activity     Days per week: Not on file     Minutes per session: Not on file    Stress: Not on file   Relationships    Social connections     Talks on phone: Not on file     Gets together: Not on file     Attends Jainism service: Not on file     Active member of club or organization: Not on file     Attends meetings of clubs or organizations: Not on file     Relationship status: Not on file   Other Topics Concern    Not on file   Social History Narrative    Not on file       Review of Systems   Constitutional: Negative for activity change, appetite change, fatigue, fever and unexpected weight change.   HENT: Negative for congestion, ear pain, mouth sores, nosebleeds, postnasal drip, rhinorrhea, sinus pressure, sneezing, sore throat, trouble swallowing and voice change.    Eyes: Negative.    Respiratory: Negative for cough, chest tightness and shortness of breath.    Cardiovascular: Negative for chest pain and palpitations.   Gastrointestinal: Negative.  Negative for abdominal pain, blood in stool, constipation, diarrhea, nausea and vomiting.   Endocrine: Negative.    Genitourinary: Negative for difficulty urinating, dysuria, flank pain, hematuria and urgency.   Musculoskeletal: Positive for arthralgias and joint swelling. Negative for back pain, gait problem, myalgias and neck pain.        Knee and ankle swelling with  "increased dose of Amlodipine   Skin: Negative for color change, rash and wound.   Allergic/Immunologic: Negative for immunocompromised state.   Neurological: Negative for dizziness, tremors, seizures, syncope, speech difficulty and headaches.   Hematological: Negative for adenopathy. Does not bruise/bleed easily.   Psychiatric/Behavioral: Negative for behavioral problems, dysphoric mood, sleep disturbance and suicidal ideas. The patient is not nervous/anxious.          Objective:     Vitals:    10/21/20 0818   BP: 120/84   BP Location: Right arm   Patient Position: Sitting   BP Method: Large (Manual)   Pulse: 76   Resp: 16   Temp: 97.8 °F (36.6 °C)   TempSrc: Oral   SpO2: 98%   Weight: 81.5 kg (179 lb 10.8 oz)   Height: 5' 6" (1.676 m)          Physical Exam  Constitutional:       General: He is not in acute distress.     Appearance: Normal appearance. He is not ill-appearing, toxic-appearing or diaphoretic.      Comments: Body mass index is 29 kg/m².     HENT:      Head: Normocephalic and atraumatic.      Right Ear: External ear normal.      Left Ear: External ear normal.   Eyes:      General: No scleral icterus.        Right eye: No discharge.         Left eye: No discharge.      Extraocular Movements: Extraocular movements intact.      Pupils: Pupils are equal, round, and reactive to light.   Cardiovascular:      Rate and Rhythm: Normal rate and regular rhythm.      Heart sounds: Normal heart sounds.   Pulmonary:      Effort: Pulmonary effort is normal. No respiratory distress.   Musculoskeletal: Normal range of motion.         General: No swelling or deformity.      Right lower leg: No edema.      Left lower leg: No edema.   Skin:     General: Skin is warm and dry.   Neurological:      Mental Status: He is oriented to person, place, and time.   Psychiatric:         Mood and Affect: Mood normal.         Behavior: Behavior normal.         Thought Content: Thought content normal.         Judgment: Judgment normal. "           Assessment:         ICD-10-CM ICD-9-CM   1. Essential hypertension  I10 401.9   2. Insomnia, unspecified type  G47.00 780.52   3. Type 2 diabetes mellitus without complication, without long-term current use of insulin  E11.9 250.00   4. Hyperlipidemia associated with type 2 diabetes mellitus  E11.69 250.80    E78.5 272.4   5. Screening for deficiency anemia  Z13.0 V78.1   6. Thyroid disorder screen  Z13.29 V77.0   7. Prostate cancer screening  Z12.5 V76.44   8. Screening for HIV without presence of risk factors  Z11.4 V73.89   9. Encounter for blood test for routine general physical examination  Z00.00 V72.62       Plan:       Essential hypertension  -  DECREASE the Amlodipine back down to 5 mg daily and ADD ON HCTZ 12.5 mg daily.  Continue Valsartan 320 mg daily. Monitor Blood Pressure at home - as long as BP < 130/80 - follow up in 3 months.    -  BRING IN HOME BP CUFF TO NEXT VISIT   -     amLODIPine (NORVASC) 5 MG tablet; Take 1 tablet (5 mg total) by mouth once daily.  Dispense: 90 tablet; Refill: 0  -     hydroCHLOROthiazide (HYDRODIURIL) 12.5 MG Tab; Take 1 tablet (12.5 mg total) by mouth once daily.  Dispense: 90 tablet; Refill: 0    Insomnia, unspecified type  -  Controlled on Clonazepam as needed.  Failed on trazodone and refused to try other alternatives - does not take every night - will send refill request when needed.      DUE TO FOLLOW UP ON OTHER CHRONIC HEALTH PROBLEMS IN January 2021:  Type 2 diabetes mellitus without complication, without long-term current use of insulin  -     Comprehensive Metabolic Panel; Future; Expected date: 10/21/2020  -     Hemoglobin A1C; Future; Expected date: 10/21/2020    Hyperlipidemia associated with type 2 diabetes mellitus  -     Lipid Panel; Future; Expected date: 10/21/2020    Screening for deficiency anemia  -     CBC auto differential; Future; Expected date: 10/21/2020    Thyroid disorder screen  -     TSH; Future; Expected date:  10/21/2020    Prostate cancer screening  -     PSA, Screening; Future; Expected date: 10/21/2020    Screening for HIV without presence of risk factors  -     HIV 1/2 Ag/Ab (4th Gen); Future; Expected date: 10/21/2020    Encounter for blood test for routine general physical examination  -     CBC auto differential; Future; Expected date: 10/21/2020  -     Comprehensive Metabolic Panel; Future; Expected date: 10/21/2020  -     Lipid Panel; Future; Expected date: 10/21/2020  -     Hemoglobin A1C; Future; Expected date: 10/21/2020  -     TSH; Future; Expected date: 10/21/2020  -     PSA, Screening; Future; Expected date: 10/21/2020  -     HIV 1/2 Ag/Ab (4th Gen); Future; Expected date: 10/21/2020      Follow up in about 3 months (around 1/21/2021) for FASTING LABS AND WELLNESS EXAM.     Patient's Medications   New Prescriptions    AMLODIPINE (NORVASC) 5 MG TABLET    Take 1 tablet (5 mg total) by mouth once daily.    HYDROCHLOROTHIAZIDE (HYDRODIURIL) 12.5 MG TAB    Take 1 tablet (12.5 mg total) by mouth once daily.   Previous Medications    ASPIRIN (ECOTRIN) 81 MG EC TABLET    Take 1 tablet (81 mg total) by mouth once daily.    CLONAZEPAM (KLONOPIN) 0.5 MG TABLET    Take 1 tablet (0.5 mg total) by mouth nightly as needed.    CYANOCOBALAMIN (VITAMIN B-12) 100 MCG TABLET    Take 100 mcg by mouth once daily.    FENOFIBRATE 160 MG TAB    Take 1 tablet (160 mg total) by mouth every evening.    GARLIC 400 MG TAB    Take by mouth.    MELOXICAM (MOBIC) 7.5 MG TABLET    Take 1 tablet (7.5 mg total) by mouth once daily.    METFORMIN (GLUCOPHAGE) 1000 MG TABLET    Take 1 tablet (1,000 mg total) by mouth 2 (two) times daily with meals.    PSYLLIUM 0.52 GRAM CAPSULE    Take 3 capsules by mouth 2 (two) times daily.    ROSUVASTATIN (CRESTOR) 40 MG TAB    Take 1 tablet (40 mg total) by mouth once daily.    VALSARTAN (DIOVAN) 320 MG TABLET    Take 1 tablet (320 mg total) by mouth once daily.    VITAMIN D 1000 UNITS TAB    Take 4,000  Units by mouth once daily.   Modified Medications    No medications on file   Discontinued Medications    AMLODIPINE (NORVASC) 10 MG TABLET    Take 1 tablet (10 mg total) by mouth once daily.

## 2020-11-30 DIAGNOSIS — G47.00 INSOMNIA, UNSPECIFIED TYPE: ICD-10-CM

## 2020-11-30 RX ORDER — CLONAZEPAM 0.5 MG/1
0.5 TABLET ORAL NIGHTLY PRN
Qty: 30 TABLET | Refills: 1 | Status: SHIPPED | OUTPATIENT
Start: 2020-11-30 | End: 2021-07-14 | Stop reason: SDUPTHER

## 2020-11-30 NOTE — TELEPHONE ENCOUNTER
----- Message from Dulce Rodas sent at 11/30/2020 10:16 AM CST -----  Type:  RX Refill Request    Who Called:  Bartolome   Refill or New Rx: Refill   RX Name and Strength: clonazePAM (KLONOPIN) 0.5 MG tablet 30 tablet   Preferred Pharm[facility] with phone number: [id number] [place] [facility] [address] [facility], [address] [phone] (Phone)  [phone] (Fax)  Would the patient rather a call back or a response via [facility]? Call back   Best Call Back Number: [phone]   Additional Information:

## 2021-01-04 ENCOUNTER — PATIENT MESSAGE (OUTPATIENT)
Dept: ADMINISTRATIVE | Facility: HOSPITAL | Age: 66
End: 2021-01-04

## 2021-01-12 ENCOUNTER — OFFICE VISIT (OUTPATIENT)
Dept: FAMILY MEDICINE | Facility: CLINIC | Age: 66
End: 2021-01-12
Payer: COMMERCIAL

## 2021-01-12 VITALS
SYSTOLIC BLOOD PRESSURE: 134 MMHG | WEIGHT: 182.19 LBS | BODY MASS INDEX: 29.28 KG/M2 | HEART RATE: 81 BPM | HEIGHT: 66 IN | TEMPERATURE: 98 F | OXYGEN SATURATION: 98 % | RESPIRATION RATE: 16 BRPM | DIASTOLIC BLOOD PRESSURE: 84 MMHG

## 2021-01-12 DIAGNOSIS — E11.69 HYPERLIPIDEMIA ASSOCIATED WITH TYPE 2 DIABETES MELLITUS: ICD-10-CM

## 2021-01-12 DIAGNOSIS — E78.5 HYPERLIPIDEMIA ASSOCIATED WITH TYPE 2 DIABETES MELLITUS: ICD-10-CM

## 2021-01-12 DIAGNOSIS — E11.9 TYPE 2 DIABETES MELLITUS WITHOUT COMPLICATION, WITHOUT LONG-TERM CURRENT USE OF INSULIN: ICD-10-CM

## 2021-01-12 DIAGNOSIS — Z00.00 ANNUAL PHYSICAL EXAM: Primary | ICD-10-CM

## 2021-01-12 DIAGNOSIS — G47.00 INSOMNIA, UNSPECIFIED TYPE: ICD-10-CM

## 2021-01-12 DIAGNOSIS — I10 ESSENTIAL HYPERTENSION: ICD-10-CM

## 2021-01-12 PROCEDURE — 3079F PR MOST RECENT DIASTOLIC BLOOD PRESSURE 80-89 MM HG: ICD-10-PCS | Mod: CPTII,S$GLB,, | Performed by: NURSE PRACTITIONER

## 2021-01-12 PROCEDURE — 3075F SYST BP GE 130 - 139MM HG: CPT | Mod: CPTII,S$GLB,, | Performed by: NURSE PRACTITIONER

## 2021-01-12 PROCEDURE — 3008F BODY MASS INDEX DOCD: CPT | Mod: CPTII,S$GLB,, | Performed by: NURSE PRACTITIONER

## 2021-01-12 PROCEDURE — 3044F HG A1C LEVEL LT 7.0%: CPT | Mod: CPTII,S$GLB,, | Performed by: NURSE PRACTITIONER

## 2021-01-12 PROCEDURE — 3044F PR MOST RECENT HEMOGLOBIN A1C LEVEL <7.0%: ICD-10-PCS | Mod: CPTII,S$GLB,, | Performed by: NURSE PRACTITIONER

## 2021-01-12 PROCEDURE — 3075F PR MOST RECENT SYSTOLIC BLOOD PRESS GE 130-139MM HG: ICD-10-PCS | Mod: CPTII,S$GLB,, | Performed by: NURSE PRACTITIONER

## 2021-01-12 PROCEDURE — 99396 PR PREVENTIVE VISIT,EST,40-64: ICD-10-PCS | Mod: S$GLB,,, | Performed by: NURSE PRACTITIONER

## 2021-01-12 PROCEDURE — 99999 PR PBB SHADOW E&M-EST. PATIENT-LVL IV: ICD-10-PCS | Mod: PBBFAC,,, | Performed by: NURSE PRACTITIONER

## 2021-01-12 PROCEDURE — 1126F PR PAIN SEVERITY QUANTIFIED, NO PAIN PRESENT: ICD-10-PCS | Mod: S$GLB,,, | Performed by: NURSE PRACTITIONER

## 2021-01-12 PROCEDURE — 99999 PR PBB SHADOW E&M-EST. PATIENT-LVL IV: CPT | Mod: PBBFAC,,, | Performed by: NURSE PRACTITIONER

## 2021-01-12 PROCEDURE — 99396 PREV VISIT EST AGE 40-64: CPT | Mod: S$GLB,,, | Performed by: NURSE PRACTITIONER

## 2021-01-12 PROCEDURE — 1126F AMNT PAIN NOTED NONE PRSNT: CPT | Mod: S$GLB,,, | Performed by: NURSE PRACTITIONER

## 2021-01-12 PROCEDURE — 3008F PR BODY MASS INDEX (BMI) DOCUMENTED: ICD-10-PCS | Mod: CPTII,S$GLB,, | Performed by: NURSE PRACTITIONER

## 2021-01-12 PROCEDURE — 3079F DIAST BP 80-89 MM HG: CPT | Mod: CPTII,S$GLB,, | Performed by: NURSE PRACTITIONER

## 2021-01-12 RX ORDER — AMLODIPINE BESYLATE 5 MG/1
5 TABLET ORAL DAILY
Qty: 90 TABLET | Refills: 1 | Status: SHIPPED | OUTPATIENT
Start: 2021-01-12 | End: 2021-07-14 | Stop reason: SDUPTHER

## 2021-01-12 RX ORDER — HYDROCHLOROTHIAZIDE 12.5 MG/1
12.5 TABLET ORAL DAILY
Qty: 90 TABLET | Refills: 1 | Status: SHIPPED | OUTPATIENT
Start: 2021-01-12 | End: 2021-07-14 | Stop reason: SDUPTHER

## 2021-01-12 RX ORDER — VALSARTAN 320 MG/1
320 TABLET ORAL DAILY
Qty: 90 TABLET | Refills: 1 | Status: SHIPPED | OUTPATIENT
Start: 2021-01-12 | End: 2021-06-24 | Stop reason: SDUPTHER

## 2021-04-05 ENCOUNTER — PATIENT MESSAGE (OUTPATIENT)
Dept: ADMINISTRATIVE | Facility: HOSPITAL | Age: 66
End: 2021-04-05

## 2021-05-04 ENCOUNTER — PATIENT MESSAGE (OUTPATIENT)
Dept: RESEARCH | Facility: HOSPITAL | Age: 66
End: 2021-05-04

## 2021-06-24 DIAGNOSIS — I10 ESSENTIAL HYPERTENSION: ICD-10-CM

## 2021-06-24 RX ORDER — VALSARTAN 320 MG/1
320 TABLET ORAL DAILY
Qty: 30 TABLET | Refills: 0 | Status: SHIPPED | OUTPATIENT
Start: 2021-06-24 | End: 2021-07-14 | Stop reason: SDUPTHER

## 2021-06-30 ENCOUNTER — PATIENT OUTREACH (OUTPATIENT)
Dept: ADMINISTRATIVE | Facility: HOSPITAL | Age: 66
End: 2021-06-30

## 2021-06-30 DIAGNOSIS — E11.9 TYPE 2 DIABETES MELLITUS WITHOUT COMPLICATION, WITHOUT LONG-TERM CURRENT USE OF INSULIN: Primary | ICD-10-CM

## 2021-07-07 ENCOUNTER — PATIENT MESSAGE (OUTPATIENT)
Dept: ADMINISTRATIVE | Facility: HOSPITAL | Age: 66
End: 2021-07-07

## 2021-07-12 ENCOUNTER — TELEPHONE (OUTPATIENT)
Dept: ADMINISTRATIVE | Facility: HOSPITAL | Age: 66
End: 2021-07-12

## 2021-07-12 ENCOUNTER — PATIENT OUTREACH (OUTPATIENT)
Dept: ADMINISTRATIVE | Facility: HOSPITAL | Age: 66
End: 2021-07-12

## 2021-07-12 LAB
LEFT EYE DM RETINOPATHY: NEGATIVE
RIGHT EYE DM RETINOPATHY: NEGATIVE

## 2021-07-14 ENCOUNTER — TELEPHONE (OUTPATIENT)
Dept: FAMILY MEDICINE | Facility: CLINIC | Age: 66
End: 2021-07-14

## 2021-07-14 ENCOUNTER — OFFICE VISIT (OUTPATIENT)
Dept: FAMILY MEDICINE | Facility: CLINIC | Age: 66
End: 2021-07-14
Payer: COMMERCIAL

## 2021-07-14 VITALS
TEMPERATURE: 98 F | HEIGHT: 66 IN | HEART RATE: 80 BPM | WEIGHT: 180.56 LBS | BODY MASS INDEX: 29.02 KG/M2 | SYSTOLIC BLOOD PRESSURE: 136 MMHG | OXYGEN SATURATION: 98 % | DIASTOLIC BLOOD PRESSURE: 88 MMHG | RESPIRATION RATE: 16 BRPM

## 2021-07-14 DIAGNOSIS — E11.9 TYPE 2 DIABETES MELLITUS WITHOUT COMPLICATION, WITHOUT LONG-TERM CURRENT USE OF INSULIN: Primary | ICD-10-CM

## 2021-07-14 DIAGNOSIS — I10 ESSENTIAL HYPERTENSION: ICD-10-CM

## 2021-07-14 DIAGNOSIS — Z12.11 COLON CANCER SCREENING: ICD-10-CM

## 2021-07-14 DIAGNOSIS — E78.5 HYPERLIPIDEMIA ASSOCIATED WITH TYPE 2 DIABETES MELLITUS: ICD-10-CM

## 2021-07-14 DIAGNOSIS — G47.00 INSOMNIA, UNSPECIFIED TYPE: ICD-10-CM

## 2021-07-14 DIAGNOSIS — E11.69 HYPERLIPIDEMIA ASSOCIATED WITH TYPE 2 DIABETES MELLITUS: ICD-10-CM

## 2021-07-14 PROCEDURE — 99214 PR OFFICE/OUTPT VISIT, EST, LEVL IV, 30-39 MIN: ICD-10-PCS | Mod: S$GLB,,, | Performed by: NURSE PRACTITIONER

## 2021-07-14 PROCEDURE — 1126F AMNT PAIN NOTED NONE PRSNT: CPT | Mod: S$GLB,,, | Performed by: NURSE PRACTITIONER

## 2021-07-14 PROCEDURE — 3008F PR BODY MASS INDEX (BMI) DOCUMENTED: ICD-10-PCS | Mod: CPTII,S$GLB,, | Performed by: NURSE PRACTITIONER

## 2021-07-14 PROCEDURE — 99214 OFFICE O/P EST MOD 30 MIN: CPT | Mod: S$GLB,,, | Performed by: NURSE PRACTITIONER

## 2021-07-14 PROCEDURE — 99999 PR PBB SHADOW E&M-EST. PATIENT-LVL IV: CPT | Mod: PBBFAC,,, | Performed by: NURSE PRACTITIONER

## 2021-07-14 PROCEDURE — 3044F PR MOST RECENT HEMOGLOBIN A1C LEVEL <7.0%: ICD-10-PCS | Mod: CPTII,S$GLB,, | Performed by: NURSE PRACTITIONER

## 2021-07-14 PROCEDURE — 3079F DIAST BP 80-89 MM HG: CPT | Mod: CPTII,S$GLB,, | Performed by: NURSE PRACTITIONER

## 2021-07-14 PROCEDURE — 3075F SYST BP GE 130 - 139MM HG: CPT | Mod: CPTII,S$GLB,, | Performed by: NURSE PRACTITIONER

## 2021-07-14 PROCEDURE — 3044F HG A1C LEVEL LT 7.0%: CPT | Mod: CPTII,S$GLB,, | Performed by: NURSE PRACTITIONER

## 2021-07-14 PROCEDURE — 3008F BODY MASS INDEX DOCD: CPT | Mod: CPTII,S$GLB,, | Performed by: NURSE PRACTITIONER

## 2021-07-14 PROCEDURE — 3075F PR MOST RECENT SYSTOLIC BLOOD PRESS GE 130-139MM HG: ICD-10-PCS | Mod: CPTII,S$GLB,, | Performed by: NURSE PRACTITIONER

## 2021-07-14 PROCEDURE — 99999 PR PBB SHADOW E&M-EST. PATIENT-LVL IV: ICD-10-PCS | Mod: PBBFAC,,, | Performed by: NURSE PRACTITIONER

## 2021-07-14 PROCEDURE — 1126F PR PAIN SEVERITY QUANTIFIED, NO PAIN PRESENT: ICD-10-PCS | Mod: S$GLB,,, | Performed by: NURSE PRACTITIONER

## 2021-07-14 PROCEDURE — 3079F PR MOST RECENT DIASTOLIC BLOOD PRESSURE 80-89 MM HG: ICD-10-PCS | Mod: CPTII,S$GLB,, | Performed by: NURSE PRACTITIONER

## 2021-07-14 RX ORDER — HYDROCHLOROTHIAZIDE 25 MG/1
25 TABLET ORAL DAILY
Qty: 30 TABLET | Refills: 0 | Status: SHIPPED | OUTPATIENT
Start: 2021-07-14 | End: 2021-08-12 | Stop reason: SDUPTHER

## 2021-07-14 RX ORDER — METFORMIN HYDROCHLORIDE 1000 MG/1
1000 TABLET ORAL 2 TIMES DAILY WITH MEALS
Qty: 180 TABLET | Refills: 1 | Status: SHIPPED | OUTPATIENT
Start: 2021-07-14 | End: 2021-12-17 | Stop reason: SDUPTHER

## 2021-07-14 RX ORDER — MOMETASONE FUROATE 50 UG/1
2 SPRAY, METERED NASAL DAILY
Qty: 17 G | Refills: 5 | Status: SHIPPED | OUTPATIENT
Start: 2021-07-14

## 2021-07-14 RX ORDER — AMLODIPINE BESYLATE 5 MG/1
5 TABLET ORAL DAILY
Qty: 30 TABLET | Refills: 0 | Status: SHIPPED | OUTPATIENT
Start: 2021-07-14 | End: 2021-08-12 | Stop reason: SDUPTHER

## 2021-07-14 RX ORDER — CLONAZEPAM 0.5 MG/1
0.5 TABLET ORAL NIGHTLY PRN
Qty: 30 TABLET | Refills: 0 | Status: SHIPPED | OUTPATIENT
Start: 2021-07-14 | End: 2022-02-08 | Stop reason: ALTCHOICE

## 2021-07-14 RX ORDER — ROSUVASTATIN CALCIUM 40 MG/1
40 TABLET, COATED ORAL DAILY
Qty: 90 TABLET | Refills: 1 | Status: SHIPPED | OUTPATIENT
Start: 2021-07-14 | End: 2022-01-11

## 2021-07-14 RX ORDER — FENOFIBRATE 160 MG/1
160 TABLET ORAL NIGHTLY
Qty: 90 TABLET | Refills: 1 | Status: SHIPPED | OUTPATIENT
Start: 2021-07-14 | End: 2021-12-23

## 2021-07-14 RX ORDER — VALSARTAN 320 MG/1
320 TABLET ORAL DAILY
Qty: 30 TABLET | Refills: 0 | Status: SHIPPED | OUTPATIENT
Start: 2021-07-14 | End: 2021-08-12 | Stop reason: SDUPTHER

## 2021-07-25 LAB — NONINV COLON CA DNA+OCC BLD SCRN STL QL: POSITIVE

## 2021-07-26 ENCOUNTER — TELEPHONE (OUTPATIENT)
Dept: FAMILY MEDICINE | Facility: CLINIC | Age: 66
End: 2021-07-26

## 2021-07-26 ENCOUNTER — TELEPHONE (OUTPATIENT)
Dept: SURGERY | Facility: CLINIC | Age: 66
End: 2021-07-26

## 2021-07-26 DIAGNOSIS — R19.5 POSITIVE COLORECTAL CANCER SCREENING USING COLOGUARD TEST: Primary | ICD-10-CM

## 2021-07-26 RX ORDER — SODIUM, POTASSIUM,MAG SULFATES 17.5-3.13G
1 SOLUTION, RECONSTITUTED, ORAL ORAL DAILY
Qty: 1 KIT | Refills: 0 | Status: SHIPPED | OUTPATIENT
Start: 2021-07-26 | End: 2021-07-28

## 2021-08-12 ENCOUNTER — OFFICE VISIT (OUTPATIENT)
Dept: FAMILY MEDICINE | Facility: CLINIC | Age: 66
End: 2021-08-12
Payer: COMMERCIAL

## 2021-08-12 VITALS
RESPIRATION RATE: 18 BRPM | OXYGEN SATURATION: 98 % | SYSTOLIC BLOOD PRESSURE: 132 MMHG | BODY MASS INDEX: 29.18 KG/M2 | TEMPERATURE: 98 F | HEART RATE: 75 BPM | DIASTOLIC BLOOD PRESSURE: 84 MMHG | WEIGHT: 181.56 LBS | HEIGHT: 66 IN

## 2021-08-12 DIAGNOSIS — Z13.0 SCREENING FOR DEFICIENCY ANEMIA: ICD-10-CM

## 2021-08-12 DIAGNOSIS — E11.69 HYPERLIPIDEMIA ASSOCIATED WITH TYPE 2 DIABETES MELLITUS: ICD-10-CM

## 2021-08-12 DIAGNOSIS — Z00.00 ENCOUNTER FOR BLOOD TEST FOR ROUTINE GENERAL PHYSICAL EXAMINATION: ICD-10-CM

## 2021-08-12 DIAGNOSIS — Z12.5 PROSTATE CANCER SCREENING: ICD-10-CM

## 2021-08-12 DIAGNOSIS — R19.5 POSITIVE COLORECTAL CANCER SCREENING USING COLOGUARD TEST: ICD-10-CM

## 2021-08-12 DIAGNOSIS — Z13.29 THYROID DISORDER SCREEN: ICD-10-CM

## 2021-08-12 DIAGNOSIS — E78.5 HYPERLIPIDEMIA ASSOCIATED WITH TYPE 2 DIABETES MELLITUS: ICD-10-CM

## 2021-08-12 DIAGNOSIS — I10 ESSENTIAL HYPERTENSION: Primary | ICD-10-CM

## 2021-08-12 DIAGNOSIS — E11.9 TYPE 2 DIABETES MELLITUS WITHOUT COMPLICATION, WITHOUT LONG-TERM CURRENT USE OF INSULIN: ICD-10-CM

## 2021-08-12 PROCEDURE — 3075F PR MOST RECENT SYSTOLIC BLOOD PRESS GE 130-139MM HG: ICD-10-PCS | Mod: CPTII,S$GLB,, | Performed by: NURSE PRACTITIONER

## 2021-08-12 PROCEDURE — 3008F PR BODY MASS INDEX (BMI) DOCUMENTED: ICD-10-PCS | Mod: CPTII,S$GLB,, | Performed by: NURSE PRACTITIONER

## 2021-08-12 PROCEDURE — 1126F AMNT PAIN NOTED NONE PRSNT: CPT | Mod: CPTII,S$GLB,, | Performed by: NURSE PRACTITIONER

## 2021-08-12 PROCEDURE — 1159F PR MEDICATION LIST DOCUMENTED IN MEDICAL RECORD: ICD-10-PCS | Mod: CPTII,S$GLB,, | Performed by: NURSE PRACTITIONER

## 2021-08-12 PROCEDURE — 3044F HG A1C LEVEL LT 7.0%: CPT | Mod: CPTII,S$GLB,, | Performed by: NURSE PRACTITIONER

## 2021-08-12 PROCEDURE — 1126F PR PAIN SEVERITY QUANTIFIED, NO PAIN PRESENT: ICD-10-PCS | Mod: CPTII,S$GLB,, | Performed by: NURSE PRACTITIONER

## 2021-08-12 PROCEDURE — 99999 PR PBB SHADOW E&M-EST. PATIENT-LVL IV: ICD-10-PCS | Mod: PBBFAC,,, | Performed by: NURSE PRACTITIONER

## 2021-08-12 PROCEDURE — 1160F RVW MEDS BY RX/DR IN RCRD: CPT | Mod: CPTII,S$GLB,, | Performed by: NURSE PRACTITIONER

## 2021-08-12 PROCEDURE — 1160F PR REVIEW ALL MEDS BY PRESCRIBER/CLIN PHARMACIST DOCUMENTED: ICD-10-PCS | Mod: CPTII,S$GLB,, | Performed by: NURSE PRACTITIONER

## 2021-08-12 PROCEDURE — 99999 PR PBB SHADOW E&M-EST. PATIENT-LVL IV: CPT | Mod: PBBFAC,,, | Performed by: NURSE PRACTITIONER

## 2021-08-12 PROCEDURE — 99214 OFFICE O/P EST MOD 30 MIN: CPT | Mod: S$GLB,,, | Performed by: NURSE PRACTITIONER

## 2021-08-12 PROCEDURE — 3008F BODY MASS INDEX DOCD: CPT | Mod: CPTII,S$GLB,, | Performed by: NURSE PRACTITIONER

## 2021-08-12 PROCEDURE — 3079F DIAST BP 80-89 MM HG: CPT | Mod: CPTII,S$GLB,, | Performed by: NURSE PRACTITIONER

## 2021-08-12 PROCEDURE — 3044F PR MOST RECENT HEMOGLOBIN A1C LEVEL <7.0%: ICD-10-PCS | Mod: CPTII,S$GLB,, | Performed by: NURSE PRACTITIONER

## 2021-08-12 PROCEDURE — 3079F PR MOST RECENT DIASTOLIC BLOOD PRESSURE 80-89 MM HG: ICD-10-PCS | Mod: CPTII,S$GLB,, | Performed by: NURSE PRACTITIONER

## 2021-08-12 PROCEDURE — 1159F MED LIST DOCD IN RCRD: CPT | Mod: CPTII,S$GLB,, | Performed by: NURSE PRACTITIONER

## 2021-08-12 PROCEDURE — 3075F SYST BP GE 130 - 139MM HG: CPT | Mod: CPTII,S$GLB,, | Performed by: NURSE PRACTITIONER

## 2021-08-12 PROCEDURE — 99214 PR OFFICE/OUTPT VISIT, EST, LEVL IV, 30-39 MIN: ICD-10-PCS | Mod: S$GLB,,, | Performed by: NURSE PRACTITIONER

## 2021-08-12 RX ORDER — VALSARTAN 320 MG/1
320 TABLET ORAL DAILY
Qty: 90 TABLET | Refills: 1 | Status: SHIPPED | OUTPATIENT
Start: 2021-08-12 | End: 2022-03-10 | Stop reason: SDUPTHER

## 2021-08-12 RX ORDER — AMLODIPINE BESYLATE 5 MG/1
5 TABLET ORAL DAILY
Qty: 90 TABLET | Refills: 1 | Status: SHIPPED | OUTPATIENT
Start: 2021-08-12 | End: 2022-03-10 | Stop reason: SDUPTHER

## 2021-08-12 RX ORDER — HYDROCHLOROTHIAZIDE 12.5 MG/1
12.5 TABLET ORAL DAILY
Qty: 90 TABLET | Refills: 1 | Status: SHIPPED | OUTPATIENT
Start: 2021-08-12 | End: 2022-02-08 | Stop reason: SINTOL

## 2021-08-17 ENCOUNTER — LAB VISIT (OUTPATIENT)
Dept: FAMILY MEDICINE | Facility: CLINIC | Age: 66
End: 2021-08-17
Payer: COMMERCIAL

## 2021-08-17 DIAGNOSIS — Z01.818 PRE-OP TESTING: ICD-10-CM

## 2021-08-17 LAB — SARS-COV-2 RDRP RESP QL NAA+PROBE: NEGATIVE

## 2021-08-19 PROBLEM — R19.5 POSITIVE COLORECTAL CANCER SCREENING USING COLOGUARD TEST: Status: ACTIVE | Noted: 2021-08-19

## 2021-12-17 DIAGNOSIS — E11.9 TYPE 2 DIABETES MELLITUS WITHOUT COMPLICATION, WITHOUT LONG-TERM CURRENT USE OF INSULIN: ICD-10-CM

## 2021-12-20 RX ORDER — METFORMIN HYDROCHLORIDE 1000 MG/1
1000 TABLET ORAL 2 TIMES DAILY WITH MEALS
Qty: 180 TABLET | Refills: 0 | Status: SHIPPED | OUTPATIENT
Start: 2021-12-20 | End: 2022-03-17

## 2022-01-13 NOTE — TELEPHONE ENCOUNTER
----- Message from Fabby Taylor sent at 7/9/2019  1:30 PM CDT -----  Contact: [phone]  He is calling to check the status on his refill.   Griseofulvin Counseling:  I discussed with the patient the risks of griseofulvin including but not limited to photosensitivity, cytopenia, liver damage, nausea/vomiting and severe allergy.  The patient understands that this medication is best absorbed when taken with a fatty meal (e.g., ice cream or french fries).

## 2022-02-08 ENCOUNTER — OFFICE VISIT (OUTPATIENT)
Dept: FAMILY MEDICINE | Facility: CLINIC | Age: 67
End: 2022-02-08
Payer: MEDICARE

## 2022-02-08 VITALS
BODY MASS INDEX: 29.76 KG/M2 | WEIGHT: 185.19 LBS | SYSTOLIC BLOOD PRESSURE: 136 MMHG | OXYGEN SATURATION: 98 % | RESPIRATION RATE: 16 BRPM | TEMPERATURE: 98 F | HEART RATE: 88 BPM | HEIGHT: 66 IN | DIASTOLIC BLOOD PRESSURE: 88 MMHG

## 2022-02-08 DIAGNOSIS — E78.5 HYPERLIPIDEMIA ASSOCIATED WITH TYPE 2 DIABETES MELLITUS: ICD-10-CM

## 2022-02-08 DIAGNOSIS — Z00.00 MEDICARE ANNUAL WELLNESS VISIT, INITIAL: Primary | ICD-10-CM

## 2022-02-08 DIAGNOSIS — Z86.010 PERSONAL HISTORY OF COLONIC POLYPS: ICD-10-CM

## 2022-02-08 DIAGNOSIS — E66.09 CLASS 1 OBESITY DUE TO EXCESS CALORIES WITH SERIOUS COMORBIDITY AND BODY MASS INDEX (BMI) OF 30.0 TO 30.9 IN ADULT: ICD-10-CM

## 2022-02-08 DIAGNOSIS — E11.59 HYPERTENSION ASSOCIATED WITH TYPE 2 DIABETES MELLITUS: ICD-10-CM

## 2022-02-08 DIAGNOSIS — E11.69 HYPERLIPIDEMIA ASSOCIATED WITH TYPE 2 DIABETES MELLITUS: ICD-10-CM

## 2022-02-08 DIAGNOSIS — G47.00 INSOMNIA, UNSPECIFIED TYPE: ICD-10-CM

## 2022-02-08 DIAGNOSIS — E11.9 TYPE 2 DIABETES MELLITUS WITHOUT COMPLICATION, WITHOUT LONG-TERM CURRENT USE OF INSULIN: ICD-10-CM

## 2022-02-08 DIAGNOSIS — E03.8 SUBCLINICAL HYPOTHYROIDISM: ICD-10-CM

## 2022-02-08 DIAGNOSIS — Z13.6 ENCOUNTER FOR ABDOMINAL AORTIC ANEURYSM (AAA) SCREENING: ICD-10-CM

## 2022-02-08 DIAGNOSIS — I15.2 HYPERTENSION ASSOCIATED WITH TYPE 2 DIABETES MELLITUS: ICD-10-CM

## 2022-02-08 PROBLEM — R79.89 ABNORMAL TSH: Status: ACTIVE | Noted: 2022-02-08

## 2022-02-08 PROBLEM — E66.811 CLASS 1 OBESITY DUE TO EXCESS CALORIES WITH SERIOUS COMORBIDITY AND BODY MASS INDEX (BMI) OF 30.0 TO 30.9 IN ADULT: Status: ACTIVE | Noted: 2022-02-08

## 2022-02-08 PROCEDURE — 3075F SYST BP GE 130 - 139MM HG: CPT | Mod: HCNC,CPTII,S$GLB, | Performed by: NURSE PRACTITIONER

## 2022-02-08 PROCEDURE — 99999 PR PBB SHADOW E&M-EST. PATIENT-LVL V: ICD-10-PCS | Mod: PBBFAC,HCNC,, | Performed by: NURSE PRACTITIONER

## 2022-02-08 PROCEDURE — 3075F PR MOST RECENT SYSTOLIC BLOOD PRESS GE 130-139MM HG: ICD-10-PCS | Mod: HCNC,CPTII,S$GLB, | Performed by: NURSE PRACTITIONER

## 2022-02-08 PROCEDURE — 1101F PR PT FALLS ASSESS DOC 0-1 FALLS W/OUT INJ PAST YR: ICD-10-PCS | Mod: HCNC,CPTII,S$GLB, | Performed by: NURSE PRACTITIONER

## 2022-02-08 PROCEDURE — 1101F PT FALLS ASSESS-DOCD LE1/YR: CPT | Mod: HCNC,CPTII,S$GLB, | Performed by: NURSE PRACTITIONER

## 2022-02-08 PROCEDURE — 3044F HG A1C LEVEL LT 7.0%: CPT | Mod: HCNC,CPTII,S$GLB, | Performed by: NURSE PRACTITIONER

## 2022-02-08 PROCEDURE — 99999 PR PBB SHADOW E&M-EST. PATIENT-LVL V: CPT | Mod: PBBFAC,HCNC,, | Performed by: NURSE PRACTITIONER

## 2022-02-08 PROCEDURE — 3288F PR FALLS RISK ASSESSMENT DOCUMENTED: ICD-10-PCS | Mod: HCNC,CPTII,S$GLB, | Performed by: NURSE PRACTITIONER

## 2022-02-08 PROCEDURE — 3079F DIAST BP 80-89 MM HG: CPT | Mod: HCNC,CPTII,S$GLB, | Performed by: NURSE PRACTITIONER

## 2022-02-08 PROCEDURE — 3288F FALL RISK ASSESSMENT DOCD: CPT | Mod: HCNC,CPTII,S$GLB, | Performed by: NURSE PRACTITIONER

## 2022-02-08 PROCEDURE — 3079F PR MOST RECENT DIASTOLIC BLOOD PRESSURE 80-89 MM HG: ICD-10-PCS | Mod: HCNC,CPTII,S$GLB, | Performed by: NURSE PRACTITIONER

## 2022-02-08 PROCEDURE — 1159F MED LIST DOCD IN RCRD: CPT | Mod: HCNC,CPTII,S$GLB, | Performed by: NURSE PRACTITIONER

## 2022-02-08 PROCEDURE — 1160F PR REVIEW ALL MEDS BY PRESCRIBER/CLIN PHARMACIST DOCUMENTED: ICD-10-PCS | Mod: HCNC,CPTII,S$GLB, | Performed by: NURSE PRACTITIONER

## 2022-02-08 PROCEDURE — G0402 INITIAL PREVENTIVE EXAM: HCPCS | Mod: HCNC,S$GLB,, | Performed by: NURSE PRACTITIONER

## 2022-02-08 PROCEDURE — G0402 PR WELCOME MEDICARE PREVENTIVE VISIT NEW ENROLLEE: ICD-10-PCS | Mod: HCNC,S$GLB,, | Performed by: NURSE PRACTITIONER

## 2022-02-08 PROCEDURE — 3008F BODY MASS INDEX DOCD: CPT | Mod: HCNC,CPTII,S$GLB, | Performed by: NURSE PRACTITIONER

## 2022-02-08 PROCEDURE — 3044F PR MOST RECENT HEMOGLOBIN A1C LEVEL <7.0%: ICD-10-PCS | Mod: HCNC,CPTII,S$GLB, | Performed by: NURSE PRACTITIONER

## 2022-02-08 PROCEDURE — 3008F PR BODY MASS INDEX (BMI) DOCUMENTED: ICD-10-PCS | Mod: HCNC,CPTII,S$GLB, | Performed by: NURSE PRACTITIONER

## 2022-02-08 PROCEDURE — 1160F RVW MEDS BY RX/DR IN RCRD: CPT | Mod: HCNC,CPTII,S$GLB, | Performed by: NURSE PRACTITIONER

## 2022-02-08 PROCEDURE — 1159F PR MEDICATION LIST DOCUMENTED IN MEDICAL RECORD: ICD-10-PCS | Mod: HCNC,CPTII,S$GLB, | Performed by: NURSE PRACTITIONER

## 2022-02-08 RX ORDER — CLONAZEPAM 0.5 MG/1
0.5 TABLET ORAL NIGHTLY PRN
Qty: 30 TABLET | Refills: 1 | Status: SHIPPED | OUTPATIENT
Start: 2022-02-08 | End: 2022-04-04

## 2022-02-08 RX ORDER — NEBIVOLOL 10 MG/1
10 TABLET ORAL DAILY
Qty: 30 TABLET | Refills: 0 | Status: SHIPPED | OUTPATIENT
Start: 2022-02-08 | End: 2022-03-07

## 2022-02-08 NOTE — PROGRESS NOTES
Subjective:       Patient ID: Bartolome Esquivel Jr. is a 67 y.o. male.    Chief Complaint: Medicare AWV    HPI    THIS IS A MEDICARE ANNUAL PHYSICAL EXAM SO HEALTH RISK ASSESSMENT, DEPRESSION SCREENING, AND ADL/FUNCTIONAL ABILITY CHECKLIST REVIEWED - SEE FLOW SHEETS.  ALL PAST MEDICAL, SURGICAL AND FAMILY HISTORY REVIEWED - SEE BELOW.  ALL CHRONIC PROBLEMS EVALUATED.       Medicare AWV:  1. HRA completed:  See Health Risk Assessment flowsheet  2. PMH and Family history reviewed and updated  3.  Updated list of current providers  Team Member Role and Specialty Contact Info Address Start End Comments   PCPs         Susie Carranza NP General (Family Medicine) Phone: [phone] Fax: [phone]  [address] [place] [address] 11/4/2014 - Merged   Additional Team Members         Susie Carranza NP (Internal Medicine) Phone: [phone] Fax: [phone] [address] [place] [address] 11/4/2014 - -   Sumaya Estrada MA Care Coordinator - - 5/30/2019 - -   CEE Perdomo MD Consulting Physician (Colon and Rectal Surgery) Phone: [phone] Pager: [phone]  [address] [place] [address] 8/19/2021 - -       4.  Updated measures/vital signs completed and stable  5. Cognitive function evaluated and intact.  6.  Health Maintenance schedule per [facility] reviewed and updated - schedule reviewed with patient - seen under POC below.  7.  Risk factor review: no depression, no cognitive impairments  8.  All preventative counseling reviewed and personalized health advice given.    9.  Advanced care planning discussed with handouts given for patient to review. He will take paperwork home to complete with family.  10.  Patient is on NO opioids  11.  Patient has NO substance use.    Patient is a 67 year old white male with Type 2 Diabetes, Hypertension, Hyperlipidemia, Anxiety, Insomnia, chronic mildly elevated liver enzymes, chronic arthralgia, and personal history of colon polyps  "that is here today for INITIAL MEDICARE AWV with fasting lab results.    Patient has Type 2 Diabetes that is controlled on Metformin 1000 mg twice daily with a  with hemoglobin A1c 6.0%.      Patient has Hypertension that is uncontrolled on Valsartan 320 mg daily and Amlodipine 5 mg daily. He stopped the HCTZ due to pain with urination.  He can not tolerate increased dose of Amlodipine due to ankle swelling. Will add on Bystolic 10 mg daily and recheck in 1 month.  /88   Pulse 88   Temp 98.4 °F (36.9 °C) (Temporal)   Resp 16   Ht 5' 5.5" (1.664 m)   Wt 84 kg (185 lb 3 oz)   SpO2 98%   BMI 30.35 kg/m²      Patient has Hyperlipidemia associated with Type 2 Diabetes.  Levels are controlled on Rosuvastatin 40 mg daily with Fenofibrate 160 mg daily.  LDL 98.6 - need stricter lifestyle modifications.     Patient had mild hypercalcemia noted on wellness labs in September 2019.  With further workup, his elevated calcium level RESOLVED and the PTH level normal at 13 with normal Vitamin D level of 50. Calcium level remains normal.     Patient has history of mildly elevated liver enzymes that we have been monitoring - AST mildly elevated at 65.  ALT normal - avoid tylenol and alcohol and will monitor.    Patient has Anxiety and Insomnia that he takes Clonazepam 0.5 mg at bedtime as needed.  Patient is well aware of safety risks as well as dependency risk associated with Benzodiazepine usage - patient FAILED on  Trazodone trial to replace Clonazepam use and refuses to try any other alternative at this time. Patient is well aware of fall risk associated with medication use.    Patient with personal history of colon polyps. Last colonoscopy on 8/19/2021 = need to repeat 1 year.     Patient is a previous smoker - quit in 1994.  He is now 67, male and history of smoking - needs AAA screening.    Patient with abnormal TSH 5.77 with normal Free T4 0.91.  NO history of abnormalities.  Will monitor and recheck in 6 " months.    Wellness labs:  -  CBC okay  -  CMP with IFG and AST 65 - discussed above; kidney function is normal  -  Cholesterol discussed above - advised to take Rosuvastatin every day - if not improved in 6 months, may need to add on medicaiton  -  PSA okay  -  TSH mildly high - will monitor.    Health Maintenance:  -  Declined pneumonia and flu vaccines.  -  AAA screening ordered.    Component      Latest Ref Rng & Units 2/3/2022 7/9/2021 1/5/2021   WBC      3.90 - 12.70 K/uL 7.12  6.76   RBC      4.60 - 6.20 M/uL 5.15  4.92   Hemoglobin      14.0 - 18.0 g/dL 16.0  15.9   Hematocrit      40.0 - 54.0 % 46.3  45.5   MCV      82 - 98 fL 90  93   MCH      27.0 - 31.0 pg 31.1 (H)  32.3 (H)   MCHC      32.0 - 36.0 g/dL 34.6  34.9   RDW      11.5 - 14.5 % 11.6  11.3 (L)   Platelets      150 - 450 K/uL 282  268   MPV      9.2 - 12.9 fL 11.5  11.3   Immature Granulocytes      0.0 - 0.5 % 0.3  0.3   Gran # (ANC)      1.8 - 7.7 K/uL 3.9  3.8   Immature Grans (Abs)      0.00 - 0.04 K/uL 0.02  0.02   Lymph #      1.0 - 4.8 K/uL 2.3  2.1   Mono #      0.3 - 1.0 K/uL 0.7  0.7   Eos #      0.0 - 0.5 K/uL 0.2  0.2   Baso #      0.00 - 0.20 K/uL 0.04  0.04   nRBC      0 /100 WBC 0  0   Gran %      38.0 - 73.0 % 54.9  55.5   Lymph %      18.0 - 48.0 % 32.0  30.5   Mono %      4.0 - 15.0 % 9.7  10.1   Eosinophil %      0.0 - 8.0 % 2.5  3.0   Basophil %      0.0 - 1.9 % 0.6  0.6   Differential Method       Automated  Automated   Neutrophils, Abs      1,500 - 7,800 cells/uL      Neutrophils Relative      %      Sodium      136 - 145 mmol/L 140 137 135 (L)   Potassium      3.5 - 5.1 mmol/L 4.8 5.1 4.9   Chloride      95 - 110 mmol/L 99 97 97   CO2      23 - 29 mmol/L 28 28 27   Glucose      70 - 110 mg/dL 121 (H) 138 (H) 134 (H)   BUN      2 - 20 mg/dL 10 16 10   Creatinine      0.50 - 1.40 mg/dL 0.79 0.73 0.72   Calcium      8.7 - 10.5 mg/dL 9.8 10.0 9.9   PROTEIN TOTAL      6.0 - 8.4 g/dL 7.7 7.9 7.7   Albumin      3.5 - 5.2 g/dL 5.0  4.9 4.8   BILIRUBIN TOTAL      0.1 - 1.0 mg/dL 0.7 0.4 0.7   Alkaline Phosphatase      38 - 126 U/L 62 65 56   AST      15 - 46 U/L 65 (H) 33 42   ALT      10 - 44 U/L 30 32 37   Anion Gap      8 - 16 mmol/L 13 12 11   eGFR if African American      >60 mL/min/1.73 m:2 >60.0 >60.0 >60.0   eGFR if non African American      >60 mL/min/1.73 m:2 >60.0 >60.0 >60.0   Cholesterol      120 - 199 mg/dL 168 137 154   Triglycerides      30 - 150 mg/dL 142 94 132   HDL      40 - 75 mg/dL 41 44 55   LDL Cholesterol External      63.0 - 159.0 mg/dL 98.6 74.2 72.6   HDL/Cholesterol Ratio      20.0 - 50.0 % 24.4 32.1 35.7   Total Cholesterol/HDL Ratio      2.0 - 5.0 4.1 3.1 2.8   Non-HDL Cholesterol      mg/dL 127 93 99   Hemoglobin A1C External      4.0 - 5.6 % 6.0 (H) 5.9 (H) 5.8 (H)   Estimated Avg Glucose      68 - 131 mg/dL 126 123 120   TSH      0.400 - 4.000 uIU/mL 5.770 (H)  2.390   PSA, Screen      0.00 - 4.00 ng/mL 1.7  0.54   Free T4      0.71 - 1.51 ng/dL 0.91       Past Medical History:   Diagnosis Date    Anxiety     DM (diabetes mellitus) 11/4/2014    Hyperlipidemia     Hypertension     Impaired fasting glucose     Metabolic syndrome        Past Surgical History:   Procedure Laterality Date    COLONOSCOPY  2007    + polyp - tubular adenoma - Dr. Hernandez    COLONOSCOPY N/A 8/19/2021    Procedure: COLONOSCOPY;  Surgeon: CEE Perdomo MD;  [facility];  Service: Endoscopy;  Laterality: N/A;       Family History   Problem Relation Age of Onset    Cancer Mother         Lung Cancer and Colon Cancer    Heart disease Father 49        MI    Diabetes Father     No Known Problems Daughter     No Known Problems Son     No Known Problems Son        Social History     Socioeconomic History    Marital status:    Occupational History    Occupation: retired     Employer: [employer]   Tobacco Use    Smoking status: Former Smoker     Packs/day: 1.00     Years: 15.00     Pack years: 15.00     Types:  "Cigarettes     Quit date: 1994     Years since quittin.1    Smokeless tobacco: Former User     Types: Chew    Tobacco comment: Quit in    Substance and Sexual Activity    Alcohol use: Yes     Comment: Reports 1 to 2 beers per day on weekdays and 10 beers per day on weekend days    Drug use: No    Sexual activity: Yes     Partners: Female       Review of Systems   Constitutional: Negative for activity change, appetite change, fatigue, fever and unexpected weight change.   HENT: Negative for congestion, ear pain, mouth sores, nosebleeds, postnasal drip, rhinorrhea, sinus pressure, sneezing, sore throat, trouble swallowing and voice change.    Eyes: Negative.    Respiratory: Negative for cough, chest tightness and shortness of breath.    Cardiovascular: Negative for chest pain, palpitations and leg swelling.   Gastrointestinal: Negative.  Negative for abdominal pain, blood in stool, constipation, diarrhea, nausea and vomiting.   Endocrine: Negative.    Genitourinary: Negative for difficulty urinating, dysuria, flank pain, hematuria and urgency.   Musculoskeletal: Negative for arthralgias, back pain, gait problem, joint swelling, myalgias and neck pain.   Skin: Negative for color change, rash and wound.   Allergic/Immunologic: Negative for immunocompromised state.   Neurological: Negative for dizziness, tremors, seizures, syncope, speech difficulty and headaches.   Hematological: Negative for adenopathy. Does not bruise/bleed easily.   Psychiatric/Behavioral: Negative for behavioral problems, dysphoric mood, sleep disturbance and suicidal ideas. The patient is not nervous/anxious.          Objective:     Vitals:    22 0956 22 1015   BP: 136/88 134/86   BP Location: Left arm    Patient Position: Sitting    BP Method: Large (Manual)    Pulse: 88    Resp: 16    Temp: 98.4 °F (36.9 °C)    TempSrc: Temporal    SpO2: 98%    Weight: 84 kg (185 lb 3 oz)    Height: 5' 5.5" (1.664 m)         "   Physical Exam  Constitutional:       General: He is not in acute distress.     Appearance: Normal appearance. He is not ill-appearing, toxic-appearing or diaphoretic.      Comments: Body mass index is 30.35 kg/m².         HENT:      Head: Normocephalic and atraumatic.      Right Ear: External ear normal.      Left Ear: External ear normal.   Eyes:      General: No scleral icterus.        Right eye: No discharge.         Left eye: No discharge.      Extraocular Movements: Extraocular movements intact.      Pupils: Pupils are equal, round, and reactive to light.   Cardiovascular:      Rate and Rhythm: Normal rate and regular rhythm.      Heart sounds: Normal heart sounds.   Pulmonary:      Effort: Pulmonary effort is normal. No respiratory distress.      Breath sounds: No stridor. No wheezing, rhonchi or rales.   Abdominal:      General: There is no distension.      Palpations: There is no mass.      Tenderness: There is no abdominal tenderness. There is no guarding.      Hernia: No hernia is present.   Musculoskeletal:         General: No swelling or deformity. Normal range of motion.      Right lower leg: No edema.      Left lower leg: No edema.   Skin:     General: Skin is warm and dry.   Neurological:      Mental Status: He is oriented to person, place, and time.   Psychiatric:         Mood and Affect: Mood normal.         Behavior: Behavior normal.         Thought Content: Thought content normal.         Judgment: Judgment normal.           Assessment:         ICD-10-CM ICD-9-CM   1. Medicare annual wellness visit, initial  Z00.00 V70.0   2. Type 2 diabetes mellitus without complication, without long-term current use of insulin  E11.9 250.00   3. Hyperlipidemia associated with type 2 diabetes mellitus  E11.69 250.80    E78.5 272.4   4. Hypertension associated with type 2 diabetes mellitus  E11.59 250.80    I15.2 401.9   5. Class 1 obesity due to excess calories with serious comorbidity and body mass index (BMI)  of 30.0 to 30.9 in adult  E66.09 278.00    Z68.30 V85.30   6. Abnormal TSH  R79.89 790.6   7. Insomnia, unspecified type  G47.00 780.52   8. Encounter for abdominal aortic aneurysm (AAA) screening  Z13.6 V81.2   9. Personal history of colonic polyps  Z86.010 V12.72       Plan:       Medicare annual wellness visit, initial  Health Maintenance Summary     Full History      Expand All  Collapse All    Overdue - Shingles Vaccine  (1 of 2)  Overdue - never done  No completion history exists for this topic.     Scheduled - Abdominal Aortic Aneurysm Screening  (Once)  Scheduled for 2/10/2022  No completion history exists for this topic.     Postponed - Influenza Vaccine  (1)  Postponed until 6/30/2022 02/26/2018  Declined    01/13/2017  Declined    11/06/2015  Declined      Postponed - Pneumococcal Vaccines (Age 65+)  (1 of 2 - PPSV23)  Postponed until 2/8/2023  No completion history exists for this topic.     Diabetes Urine Screening  (Yearly)  Next due on 7/9/2022 07/09/2021  MICROALB/CREAT RATIO component of Microalbumin/Creatinine Ratio, Urine    11/04/2015  MICROALB/CREAT RATIO component of Microalbumin/creatinine urine ratio      Eye Exam  (Yearly)  Next due on 7/12/2022 07/12/2021   DIABETES EYE EXAM    03/18/2019  Diabetic Eye Screening Photo    12/20/2016  Done - Dr. Humphries - no diabetic retinopathy      Foot Exam  (Yearly)  Next due on 7/14/2022 07/14/2021  Done    07/14/2021  Smart[employer]: WORKFLOW - DIABETES - DIABETIC FOOT EXAM PERFORMED    08/05/2020  Smart[employer]: WORKFLOW - DIABETES - DIABETIC FOOT EXAM PERFORMED    05/27/2020  Smart[employer]: WORKFLOW - DIABETES - DIABETIC FOOT EXAM PERFORMED    05/27/2020  Done    View More History     Hemoglobin A1c  (Every 6 Months)  Next due on 8/3/2022  02/03/2022  Hemoglobin A1C External component of Hemoglobin A1C    07/09/2021  Hemoglobin A1C External component of Hemoglobin A1C    01/05/2021  Hemoglobin A1C External component of Hemoglobin A1C    07/09/2020   Hemoglobin A1C External component of Hemoglobin A1C    09/11/2019  Hemoglobin A1C External component of Hemoglobin A1c    View More History     Colorectal Cancer Screening  (Colonoscopy - Yearly)  Next due on 8/19/2022 08/19/2021  Colonoscopy    07/21/2021  Cologuard Screening (Multitarget Stool DNA)    05/13/2020  Fecal Immunochemical Test (iFOBT)    03/16/2019  Fecal Immunochemical Test (iFOBT)    06/11/2007  Colonoscopy (Done - Dr. Hernandez - tubular adenoma)      PROSTATE-SPECIFIC ANTIGEN  (Yearly)  Next due on 2/3/2023  02/03/2022  PSA, Screening    01/05/2021  PSA, Screening    09/11/2019  PSA, Screening    02/21/2018  PSA, SCREENING    01/12/2017  PSA, Screening    View More History     Lipid Panel  (Yearly)  Next due on 2/3/2023  02/03/2022  Lipid Panel    07/09/2021  Lipid Panel    01/05/2021  Lipid Panel    07/09/2020  Lipid Panel    09/11/2019  Lipid panel    View More History     Low Dose Statin  (Yearly)  Next due on 2/8/2023 02/08/2022  Registry Metric: Last Current Statin Reviewed Date    01/11/2022  Registry Metric: Last Current Statin Order Date      TETANUS VACCINE  (Every 10 Years)  Next due on 6/23/2026 06/23/2016  Declined      Hepatitis C Screening  Completed  06/22/2016  Hepatitis C antibody      HIV Screening  Completed  01/05/2021  HIV 1/2 Ag/Ab (4th Gen)      COVID-19 Vaccine  (Series Information)  Completed  12/26/2021  Imm Admin: COVID-19, MRNA, LN-S, PF (Pfizer) (Purple Cap)    04/14/2021  Imm Admin: COVID-19, MRNA, LN-S, PF (MODERNA FULL 0.5 ML DOSE)    03/17/2021  Imm Admin: COVID-19, MRNA, LN-S, PF (MODERNA FULL 0.5 ML DOSE)            Type 2 diabetes mellitus without complication, without long-term current use of insulin  -  Continue Metformin at present dose and recheck in 6 months.      Hyperlipidemia associated with type 2 diabetes mellitus  -  Take Rosuvastatin 40 mg daily and fenofibrate 160 mg daily every single day.  Work on diet and recheck in 6 months.    Hypertension  associated with type 2 diabetes mellitus  -  Continue Valsartan 320 mg daily.  Continue Amlodipine 5 mg daily.  ADD on Bystolic 10 mg daily and recheck in 4 weeks.  -     nebivoloL (BYSTOLIC) 10 MG Tab; Take 1 tablet (10 mg total) by mouth once daily.  Dispense: 30 tablet; Refill: 0    Class 1 obesity due to excess calories with serious comorbidity and body mass index (BMI) of 30.0 to 30.9 in adult    Abnormal TSH  =-  Recheck in 6 months.    Insomnia, unspecified type  -  Use sparingly and take fall precautions.  -     clonazePAM (KLONOPIN) 0.5 MG tablet; Take 1 tablet (0.5 mg total) by mouth nightly as needed for Anxiety.  Dispense: 30 tablet; Refill: 1    Encounter for abdominal aortic aneurysm (AAA) screening  -     US Abdominal Aorta; Future; Expected date: 02/08/2022    Personal history of colonic polyps  -  Due in August 2022      Follow up in about 4 weeks (around 3/8/2022) for BP check; fasting labs and follow up in 6 months..     Patient's Medications   New Prescriptions    NEBIVOLOL (BYSTOLIC) 10 MG TAB    Take 1 tablet (10 mg total) by mouth once daily.   Previous Medications    AMLODIPINE (NORVASC) 5 MG TABLET    Take 1 tablet (5 mg total) by mouth once daily.    ASPIRIN (ECOTRIN) 81 MG EC TABLET    Take 1 tablet (81 mg total) by mouth once daily.    CYANOCOBALAMIN (VITAMIN B-12) 100 MCG TABLET    Take 100 mcg by mouth once daily.    FENOFIBRATE 160 MG TAB    TAKE ONE TABLET BY MOUTH EVERY EVENING    GARLIC 400 MG TAB    Take by mouth.    METFORMIN (GLUCOPHAGE) 1000 MG TABLET    Take 1 tablet (1,000 mg total) by mouth 2 (two) times daily with meals.    MOMETASONE (NASONEX) 50 MCG/ACTUATION NASAL SPRAY    2 sprays by Nasal route once daily.    PSYLLIUM 0.52 GRAM CAPSULE    Take 3 capsules by mouth 2 (two) times daily.    ROSUVASTATIN (CRESTOR) 40 MG TAB    TAKE ONE TABLET BY MOUTH EVERY DAY    VALSARTAN (DIOVAN) 320 MG TABLET    Take 1 tablet (320 mg total) by mouth once daily.    VITAMIN D 1000 UNITS  TAB    Take 4,000 Units by mouth once daily.   Modified Medications    Modified Medication Previous Medication    CLONAZEPAM (KLONOPIN) 0.5 MG TABLET clonazePAM (KLONOPIN) 0.5 MG tablet       Take 1 tablet (0.5 mg total) by mouth nightly as needed for Anxiety.    Take 1 tablet (0.5 mg total) by mouth nightly as needed for Anxiety.   Discontinued Medications    HYDROCHLOROTHIAZIDE (HYDRODIURIL) 12.5 MG TAB    Take 1 tablet (12.5 mg total) by mouth once daily.

## 2022-03-10 ENCOUNTER — OFFICE VISIT (OUTPATIENT)
Dept: FAMILY MEDICINE | Facility: CLINIC | Age: 67
End: 2022-03-10
Payer: MEDICARE

## 2022-03-10 VITALS
WEIGHT: 182.13 LBS | BODY MASS INDEX: 29.27 KG/M2 | HEART RATE: 71 BPM | TEMPERATURE: 98 F | SYSTOLIC BLOOD PRESSURE: 112 MMHG | HEIGHT: 66 IN | DIASTOLIC BLOOD PRESSURE: 76 MMHG | OXYGEN SATURATION: 97 %

## 2022-03-10 DIAGNOSIS — E11.59 HYPERTENSION ASSOCIATED WITH TYPE 2 DIABETES MELLITUS: ICD-10-CM

## 2022-03-10 DIAGNOSIS — I15.2 HYPERTENSION ASSOCIATED WITH TYPE 2 DIABETES MELLITUS: ICD-10-CM

## 2022-03-10 PROCEDURE — 3044F PR MOST RECENT HEMOGLOBIN A1C LEVEL <7.0%: ICD-10-PCS | Mod: CPTII,S$GLB,, | Performed by: NURSE PRACTITIONER

## 2022-03-10 PROCEDURE — 3074F SYST BP LT 130 MM HG: CPT | Mod: CPTII,S$GLB,, | Performed by: NURSE PRACTITIONER

## 2022-03-10 PROCEDURE — 99999 PR PBB SHADOW E&M-EST. PATIENT-LVL IV: ICD-10-PCS | Mod: PBBFAC,,, | Performed by: NURSE PRACTITIONER

## 2022-03-10 PROCEDURE — 4010F PR ACE/ARB THEARPY RXD/TAKEN: ICD-10-PCS | Mod: CPTII,S$GLB,, | Performed by: NURSE PRACTITIONER

## 2022-03-10 PROCEDURE — 3074F PR MOST RECENT SYSTOLIC BLOOD PRESSURE < 130 MM HG: ICD-10-PCS | Mod: CPTII,S$GLB,, | Performed by: NURSE PRACTITIONER

## 2022-03-10 PROCEDURE — 3008F PR BODY MASS INDEX (BMI) DOCUMENTED: ICD-10-PCS | Mod: CPTII,S$GLB,, | Performed by: NURSE PRACTITIONER

## 2022-03-10 PROCEDURE — 3078F PR MOST RECENT DIASTOLIC BLOOD PRESSURE < 80 MM HG: ICD-10-PCS | Mod: CPTII,S$GLB,, | Performed by: NURSE PRACTITIONER

## 2022-03-10 PROCEDURE — 3044F HG A1C LEVEL LT 7.0%: CPT | Mod: CPTII,S$GLB,, | Performed by: NURSE PRACTITIONER

## 2022-03-10 PROCEDURE — 3078F DIAST BP <80 MM HG: CPT | Mod: CPTII,S$GLB,, | Performed by: NURSE PRACTITIONER

## 2022-03-10 PROCEDURE — 3008F BODY MASS INDEX DOCD: CPT | Mod: CPTII,S$GLB,, | Performed by: NURSE PRACTITIONER

## 2022-03-10 PROCEDURE — 1101F PT FALLS ASSESS-DOCD LE1/YR: CPT | Mod: CPTII,S$GLB,, | Performed by: NURSE PRACTITIONER

## 2022-03-10 PROCEDURE — 1160F RVW MEDS BY RX/DR IN RCRD: CPT | Mod: CPTII,S$GLB,, | Performed by: NURSE PRACTITIONER

## 2022-03-10 PROCEDURE — 99213 PR OFFICE/OUTPT VISIT, EST, LEVL III, 20-29 MIN: ICD-10-PCS | Mod: S$GLB,,, | Performed by: NURSE PRACTITIONER

## 2022-03-10 PROCEDURE — 99213 OFFICE O/P EST LOW 20 MIN: CPT | Mod: S$GLB,,, | Performed by: NURSE PRACTITIONER

## 2022-03-10 PROCEDURE — 1160F PR REVIEW ALL MEDS BY PRESCRIBER/CLIN PHARMACIST DOCUMENTED: ICD-10-PCS | Mod: CPTII,S$GLB,, | Performed by: NURSE PRACTITIONER

## 2022-03-10 PROCEDURE — 4010F ACE/ARB THERAPY RXD/TAKEN: CPT | Mod: CPTII,S$GLB,, | Performed by: NURSE PRACTITIONER

## 2022-03-10 PROCEDURE — 1159F PR MEDICATION LIST DOCUMENTED IN MEDICAL RECORD: ICD-10-PCS | Mod: CPTII,S$GLB,, | Performed by: NURSE PRACTITIONER

## 2022-03-10 PROCEDURE — 1101F PR PT FALLS ASSESS DOC 0-1 FALLS W/OUT INJ PAST YR: ICD-10-PCS | Mod: CPTII,S$GLB,, | Performed by: NURSE PRACTITIONER

## 2022-03-10 PROCEDURE — 3288F FALL RISK ASSESSMENT DOCD: CPT | Mod: CPTII,S$GLB,, | Performed by: NURSE PRACTITIONER

## 2022-03-10 PROCEDURE — 1159F MED LIST DOCD IN RCRD: CPT | Mod: CPTII,S$GLB,, | Performed by: NURSE PRACTITIONER

## 2022-03-10 PROCEDURE — 3288F PR FALLS RISK ASSESSMENT DOCUMENTED: ICD-10-PCS | Mod: CPTII,S$GLB,, | Performed by: NURSE PRACTITIONER

## 2022-03-10 PROCEDURE — 99999 PR PBB SHADOW E&M-EST. PATIENT-LVL IV: CPT | Mod: PBBFAC,,, | Performed by: NURSE PRACTITIONER

## 2022-03-10 RX ORDER — AMLODIPINE BESYLATE 5 MG/1
5 TABLET ORAL DAILY
Qty: 90 TABLET | Refills: 1 | Status: SHIPPED | OUTPATIENT
Start: 2022-03-10 | End: 2022-08-16 | Stop reason: SINTOL

## 2022-03-10 RX ORDER — NEBIVOLOL 10 MG/1
10 TABLET ORAL DAILY
Qty: 90 TABLET | Refills: 1 | Status: SHIPPED | OUTPATIENT
Start: 2022-03-10 | End: 2022-08-16 | Stop reason: ALTCHOICE

## 2022-03-10 RX ORDER — VALSARTAN 320 MG/1
320 TABLET ORAL DAILY
Qty: 90 TABLET | Refills: 1 | Status: SHIPPED | OUTPATIENT
Start: 2022-03-10 | End: 2022-08-31

## 2022-03-10 NOTE — PROGRESS NOTES
"Subjective:       Patient ID: Bartolome Esquivel Jr. is a 67 y.o. male.    Chief Complaint: Blood Pressure Check (Pt here for b/p check )    HPI    Patient is a 67 year old white male with Type 2 Diabetes, Hypertension, Hyperlipidemia, Anxiety, Insomnia, chronic mildly elevated liver enzymes, chronic arthralgia, and personal history of colon polyps that is here today for blood pressure check.  All other chronic problems address at last visit in 2022.        Hypertension  was uncontrolled on Valsartan 320 mg daily and Amlodipine 5 mg daily so added on Bystolic 10 mg daily at last visit.  Blood pressure much improved.  /76   Pulse 71   Temp 98.1 °F (36.7 °C) (Temporal)   Ht 5' 5.5" (1.664 m)   Wt 82.6 kg (182 lb 1.6 oz)   SpO2 97%   BMI 29.84 kg/m²   Unable to take HCTZ due to pain with urination  can NOT tolerate increased dose of Amlodipine due to ankle swelling    Past Medical History:   Diagnosis Date    Anxiety     DM (diabetes mellitus) 2014    Hyperlipidemia     Hypertension     Impaired fasting glucose     Metabolic syndrome        Past Surgical History:   Procedure Laterality Date    COLONOSCOPY      + polyp - tubular adenoma - Dr. Hernandez    COLONOSCOPY N/A 2021    Procedure: COLONOSCOPY;  Surgeon: CEE Perdomo MD;  Location: [facility];  Service: Endoscopy;  Laterality: N/A;       Family History   Problem Relation Age of Onset    Cancer Mother         Lung Cancer and Colon Cancer    Heart disease Father 49        MI    Diabetes Father     No Known Problems Daughter     No Known Problems Son     No Known Problems Son        Social History     Socioeconomic History    Marital status:    Occupational History    Occupation: retired     Employer: [employer]   Tobacco Use    Smoking status: Former Smoker     Packs/day: 1.00     Years: 15.00     Pack years: 15.00     Types: Cigarettes     Quit date: 1994     Years since quittin.1    Smokeless " "tobacco: Former User     Types: Chew    Tobacco comment: Quit in 1994   Substance and Sexual Activity    Alcohol use: Yes     Comment: Reports 1 to 2 beers per day on weekdays and 10 beers per day on weekend days    Drug use: No    Sexual activity: Yes     Partners: Female       Review of Systems   Constitutional: Negative for activity change, appetite change, fatigue, fever and unexpected weight change.   HENT: Negative for congestion, ear pain, mouth sores, nosebleeds, postnasal drip, rhinorrhea, sinus pressure, sneezing, sore throat, trouble swallowing and voice change.    Eyes: Negative.    Respiratory: Negative for cough, chest tightness and shortness of breath.    Cardiovascular: Negative for chest pain, palpitations and leg swelling.   Gastrointestinal: Negative.  Negative for abdominal pain, blood in stool, constipation, diarrhea, nausea and vomiting.   Endocrine: Negative.    Genitourinary: Negative for difficulty urinating, dysuria, flank pain, hematuria and urgency.   Musculoskeletal: Negative for arthralgias, back pain, gait problem, joint swelling, myalgias and neck pain.   Skin: Negative for color change, rash and wound.   Allergic/Immunologic: Negative for immunocompromised state.   Neurological: Negative for dizziness, tremors, seizures, syncope, speech difficulty and headaches.   Hematological: Negative for adenopathy. Does not bruise/bleed easily.   Psychiatric/Behavioral: Negative for behavioral problems, dysphoric mood, sleep disturbance and suicidal ideas. The patient is not nervous/anxious.          Objective:     Vitals:    03/10/22 0846   BP: 112/76   Pulse: 71   Temp: 98.1 °F (36.7 °C)   TempSrc: Temporal   SpO2: 97%   Weight: 82.6 kg (182 lb 1.6 oz)   Height: 5' 5.5" (1.664 m)          Physical Exam  Constitutional:       General: He is not in acute distress.     Appearance: Normal appearance. He is not ill-appearing, toxic-appearing or diaphoretic.      Comments: Body mass index is " 29.84 kg/m².           HENT:      Head: Normocephalic and atraumatic.      Right Ear: External ear normal.      Left Ear: External ear normal.   Eyes:      General: No scleral icterus.        Right eye: No discharge.         Left eye: No discharge.      Extraocular Movements: Extraocular movements intact.      Pupils: Pupils are equal, round, and reactive to light.   Cardiovascular:      Rate and Rhythm: Normal rate and regular rhythm.      Heart sounds: Normal heart sounds.   Pulmonary:      Effort: Pulmonary effort is normal. No respiratory distress.      Breath sounds: No stridor. No wheezing, rhonchi or rales.   Abdominal:      General: There is no distension.      Palpations: There is no mass.      Tenderness: There is no abdominal tenderness. There is no guarding.      Hernia: No hernia is present.   Musculoskeletal:         General: No swelling or deformity. Normal range of motion.      Right lower leg: No edema.      Left lower leg: No edema.   Skin:     General: Skin is warm and dry.   Neurological:      Mental Status: He is oriented to person, place, and time.   Psychiatric:         Mood and Affect: Mood normal.         Behavior: Behavior normal.         Thought Content: Thought content normal.         Judgment: Judgment normal.           Assessment:         ICD-10-CM ICD-9-CM   1. Hypertension associated with type 2 diabetes mellitus  E11.59 250.80    I15.2 401.9       Plan:       Hypertension associated with type 2 diabetes mellitus  -  Controlled on current medications - recheck in 5 months.  -     amLODIPine (NORVASC) 5 MG tablet; Take 1 tablet (5 mg total) by mouth once daily.  Dispense: 90 tablet; Refill: 1  -     valsartan (DIOVAN) 320 MG tablet; Take 1 tablet (320 mg total) by mouth once daily.  Dispense: 90 tablet; Refill: 1  -     nebivoloL (BYSTOLIC) 10 MG Tab; Take 1 tablet (10 mg total) by mouth once daily.  Dispense: 90 tablet; Refill: 1      Follow up in about 5 months (around 8/10/2022) for  fasting labs and follow up.     Patient's Medications   New Prescriptions    No medications on file   Previous Medications    ASPIRIN (ECOTRIN) 81 MG EC TABLET    Take 1 tablet (81 mg total) by mouth once daily.    CLONAZEPAM (KLONOPIN) 0.5 MG TABLET    Take 1 tablet (0.5 mg total) by mouth nightly as needed for Anxiety.    CYANOCOBALAMIN (VITAMIN B-12) 100 MCG TABLET    Take 100 mcg by mouth once daily.    FENOFIBRATE 160 MG TAB    TAKE ONE TABLET BY MOUTH EVERY EVENING    GARLIC 400 MG TAB    Take by mouth.    METFORMIN (GLUCOPHAGE) 1000 MG TABLET    Take 1 tablet (1,000 mg total) by mouth 2 (two) times daily with meals.    MOMETASONE (NASONEX) 50 MCG/ACTUATION NASAL SPRAY    2 sprays by Nasal route once daily.    PSYLLIUM 0.52 GRAM CAPSULE    Take 3 capsules by mouth 2 (two) times daily.    ROSUVASTATIN (CRESTOR) 40 MG TAB    TAKE ONE TABLET BY MOUTH EVERY DAY    VITAMIN D 1000 UNITS TAB    Take 4,000 Units by mouth once daily.   Modified Medications    Modified Medication Previous Medication    AMLODIPINE (NORVASC) 5 MG TABLET amLODIPine (NORVASC) 5 MG tablet       Take 1 tablet (5 mg total) by mouth once daily.    Take 1 tablet (5 mg total) by mouth once daily.    NEBIVOLOL (BYSTOLIC) 10 MG TAB nebivoloL (BYSTOLIC) 10 MG Tab       Take 1 tablet (10 mg total) by mouth once daily.    TAKE ONE TABLET BY MOUTH EVERY DAY    VALSARTAN (DIOVAN) 320 MG TABLET valsartan (DIOVAN) 320 MG tablet       Take 1 tablet (320 mg total) by mouth once daily.    Take 1 tablet (320 mg total) by mouth once daily.   Discontinued Medications    No medications on file

## 2022-06-22 ENCOUNTER — PATIENT OUTREACH (OUTPATIENT)
Dept: ADMINISTRATIVE | Facility: HOSPITAL | Age: 67
End: 2022-06-22
Payer: MEDICARE

## 2022-06-22 ENCOUNTER — PATIENT MESSAGE (OUTPATIENT)
Dept: ADMINISTRATIVE | Facility: HOSPITAL | Age: 67
End: 2022-06-22
Payer: MEDICARE

## 2022-06-22 NOTE — PROGRESS NOTES
06/22/2022 Gap report audit performed. Care Everywhere updates requested and reviewed  Overdue HM topic chart audit and/or requested. LINKS triggered and reconciled. Media reviewed. Portal outreached regarding Health maintenance - to schedule DM eye exam     Health Maintenance Due   Topic Date Due    Shingles Vaccine (1 of 2) Never done    COVID-19 Vaccine (4 - Booster for Moderna series) 04/26/2022    Diabetes Urine Screening  07/09/2022    Eye Exam  07/12/2022    Foot Exam  07/14/2022    Colorectal Cancer Screening  08/19/2022

## 2022-08-16 ENCOUNTER — OFFICE VISIT (OUTPATIENT)
Dept: FAMILY MEDICINE | Facility: CLINIC | Age: 67
End: 2022-08-16
Payer: MEDICARE

## 2022-08-16 ENCOUNTER — TELEPHONE (OUTPATIENT)
Dept: ENDOSCOPY | Facility: HOSPITAL | Age: 67
End: 2022-08-16
Payer: MEDICARE

## 2022-08-16 ENCOUNTER — TELEPHONE (OUTPATIENT)
Dept: FAMILY MEDICINE | Facility: CLINIC | Age: 67
End: 2022-08-16

## 2022-08-16 VITALS
SYSTOLIC BLOOD PRESSURE: 138 MMHG | HEIGHT: 65 IN | HEART RATE: 74 BPM | DIASTOLIC BLOOD PRESSURE: 88 MMHG | OXYGEN SATURATION: 98 % | TEMPERATURE: 98 F | BODY MASS INDEX: 31.11 KG/M2 | WEIGHT: 186.75 LBS

## 2022-08-16 DIAGNOSIS — E11.69 HYPERLIPIDEMIA ASSOCIATED WITH TYPE 2 DIABETES MELLITUS: ICD-10-CM

## 2022-08-16 DIAGNOSIS — B35.1 TOENAIL FUNGUS: ICD-10-CM

## 2022-08-16 DIAGNOSIS — I15.2 HYPERTENSION ASSOCIATED WITH TYPE 2 DIABETES MELLITUS: ICD-10-CM

## 2022-08-16 DIAGNOSIS — K63.5 MULTIPLE POLYPS OF SIGMOID COLON: ICD-10-CM

## 2022-08-16 DIAGNOSIS — Z86.010 ENCOUNTER FOR COLONOSCOPY DUE TO HISTORY OF ADENOMATOUS COLONIC POLYPS: Primary | ICD-10-CM

## 2022-08-16 DIAGNOSIS — G47.00 INSOMNIA, UNSPECIFIED TYPE: ICD-10-CM

## 2022-08-16 DIAGNOSIS — F41.9 ANXIETY: ICD-10-CM

## 2022-08-16 DIAGNOSIS — E66.09 CLASS 1 OBESITY DUE TO EXCESS CALORIES WITH SERIOUS COMORBIDITY AND BODY MASS INDEX (BMI) OF 31.0 TO 31.9 IN ADULT: ICD-10-CM

## 2022-08-16 DIAGNOSIS — E11.9 TYPE 2 DIABETES MELLITUS WITHOUT COMPLICATION, WITHOUT LONG-TERM CURRENT USE OF INSULIN: Primary | ICD-10-CM

## 2022-08-16 DIAGNOSIS — E78.5 HYPERLIPIDEMIA ASSOCIATED WITH TYPE 2 DIABETES MELLITUS: ICD-10-CM

## 2022-08-16 DIAGNOSIS — Z12.11 ENCOUNTER FOR COLONOSCOPY DUE TO HISTORY OF ADENOMATOUS COLONIC POLYPS: Primary | ICD-10-CM

## 2022-08-16 DIAGNOSIS — E11.59 HYPERTENSION ASSOCIATED WITH TYPE 2 DIABETES MELLITUS: ICD-10-CM

## 2022-08-16 PROCEDURE — 4010F PR ACE/ARB THEARPY RXD/TAKEN: ICD-10-PCS | Mod: CPTII,S$GLB,, | Performed by: NURSE PRACTITIONER

## 2022-08-16 PROCEDURE — 99999 PR PBB SHADOW E&M-EST. PATIENT-LVL IV: CPT | Mod: PBBFAC,,, | Performed by: NURSE PRACTITIONER

## 2022-08-16 PROCEDURE — 3079F DIAST BP 80-89 MM HG: CPT | Mod: CPTII,S$GLB,, | Performed by: NURSE PRACTITIONER

## 2022-08-16 PROCEDURE — 99999 PR PBB SHADOW E&M-EST. PATIENT-LVL IV: ICD-10-PCS | Mod: PBBFAC,,, | Performed by: NURSE PRACTITIONER

## 2022-08-16 PROCEDURE — 3288F PR FALLS RISK ASSESSMENT DOCUMENTED: ICD-10-PCS | Mod: CPTII,S$GLB,, | Performed by: NURSE PRACTITIONER

## 2022-08-16 PROCEDURE — 4010F ACE/ARB THERAPY RXD/TAKEN: CPT | Mod: CPTII,S$GLB,, | Performed by: NURSE PRACTITIONER

## 2022-08-16 PROCEDURE — 1159F MED LIST DOCD IN RCRD: CPT | Mod: CPTII,S$GLB,, | Performed by: NURSE PRACTITIONER

## 2022-08-16 PROCEDURE — 3075F SYST BP GE 130 - 139MM HG: CPT | Mod: CPTII,S$GLB,, | Performed by: NURSE PRACTITIONER

## 2022-08-16 PROCEDURE — 3044F HG A1C LEVEL LT 7.0%: CPT | Mod: CPTII,S$GLB,, | Performed by: NURSE PRACTITIONER

## 2022-08-16 PROCEDURE — 99214 OFFICE O/P EST MOD 30 MIN: CPT | Mod: S$GLB,,, | Performed by: NURSE PRACTITIONER

## 2022-08-16 PROCEDURE — 3079F PR MOST RECENT DIASTOLIC BLOOD PRESSURE 80-89 MM HG: ICD-10-PCS | Mod: CPTII,S$GLB,, | Performed by: NURSE PRACTITIONER

## 2022-08-16 PROCEDURE — 99214 PR OFFICE/OUTPT VISIT, EST, LEVL IV, 30-39 MIN: ICD-10-PCS | Mod: S$GLB,,, | Performed by: NURSE PRACTITIONER

## 2022-08-16 PROCEDURE — 1101F PT FALLS ASSESS-DOCD LE1/YR: CPT | Mod: CPTII,S$GLB,, | Performed by: NURSE PRACTITIONER

## 2022-08-16 PROCEDURE — 1160F RVW MEDS BY RX/DR IN RCRD: CPT | Mod: CPTII,S$GLB,, | Performed by: NURSE PRACTITIONER

## 2022-08-16 PROCEDURE — 3044F PR MOST RECENT HEMOGLOBIN A1C LEVEL <7.0%: ICD-10-PCS | Mod: CPTII,S$GLB,, | Performed by: NURSE PRACTITIONER

## 2022-08-16 PROCEDURE — 3075F PR MOST RECENT SYSTOLIC BLOOD PRESS GE 130-139MM HG: ICD-10-PCS | Mod: CPTII,S$GLB,, | Performed by: NURSE PRACTITIONER

## 2022-08-16 PROCEDURE — 1101F PR PT FALLS ASSESS DOC 0-1 FALLS W/OUT INJ PAST YR: ICD-10-PCS | Mod: CPTII,S$GLB,, | Performed by: NURSE PRACTITIONER

## 2022-08-16 PROCEDURE — 1160F PR REVIEW ALL MEDS BY PRESCRIBER/CLIN PHARMACIST DOCUMENTED: ICD-10-PCS | Mod: CPTII,S$GLB,, | Performed by: NURSE PRACTITIONER

## 2022-08-16 PROCEDURE — 1159F PR MEDICATION LIST DOCUMENTED IN MEDICAL RECORD: ICD-10-PCS | Mod: CPTII,S$GLB,, | Performed by: NURSE PRACTITIONER

## 2022-08-16 PROCEDURE — 3288F FALL RISK ASSESSMENT DOCD: CPT | Mod: CPTII,S$GLB,, | Performed by: NURSE PRACTITIONER

## 2022-08-16 PROCEDURE — 3008F PR BODY MASS INDEX (BMI) DOCUMENTED: ICD-10-PCS | Mod: CPTII,S$GLB,, | Performed by: NURSE PRACTITIONER

## 2022-08-16 PROCEDURE — 3008F BODY MASS INDEX DOCD: CPT | Mod: CPTII,S$GLB,, | Performed by: NURSE PRACTITIONER

## 2022-08-16 RX ORDER — NEBIVOLOL 20 MG/1
20 TABLET ORAL DAILY
Qty: 90 TABLET | Refills: 1 | Status: SHIPPED | OUTPATIENT
Start: 2022-08-16 | End: 2022-08-18 | Stop reason: SDUPTHER

## 2022-08-16 RX ORDER — METFORMIN HYDROCHLORIDE 1000 MG/1
TABLET ORAL
Qty: 180 TABLET | Refills: 1 | Status: SHIPPED | OUTPATIENT
Start: 2022-08-16 | End: 2023-02-13

## 2022-08-16 RX ORDER — ROSUVASTATIN CALCIUM 40 MG/1
40 TABLET, COATED ORAL DAILY
Qty: 90 TABLET | Refills: 1 | Status: SHIPPED | OUTPATIENT
Start: 2022-08-16 | End: 2023-02-13

## 2022-08-16 RX ORDER — CLONAZEPAM 0.5 MG/1
0.5 TABLET ORAL NIGHTLY PRN
Qty: 30 TABLET | Refills: 1 | Status: CANCELLED | OUTPATIENT
Start: 2022-08-16

## 2022-08-16 RX ORDER — CICLOPIROX 80 MG/ML
SOLUTION TOPICAL NIGHTLY
Qty: 6.6 ML | Refills: 2 | Status: SHIPPED | OUTPATIENT
Start: 2022-08-16

## 2022-08-16 NOTE — PROGRESS NOTES
"Subjective:       Patient ID: Bartolome Esquivel Jr. is a 67 y.o. male.    Chief Complaint: Follow-up (6 months F/U)    HPI    Patient is a 67 year old white male with Type 2 Diabetes, Hypertension, Hyperlipidemia, Anxiety, Insomnia, chronic mildly elevated liver enzymes, chronic arthralgia, and personal history of colon polyps that is here today for 6 month follow up with fasting lab results.    Type 2 Diabetes   controlled on Metformin 1000 mg twice daily   with hemoglobin A1c 5.9%.   Foot exam today  Advised to get EYE exam now  Declined pneumonia vaccine     Hypertension  Prescribed Valsartan 320 mg daily, Amlodipine 5 mg daily, and Bystolic 10 mg daily  Patient STOPPED Amlodipine months ago due to Ankle Swelling  Unable to take HCTZ due to pain with urination  Blood pressure again elevated  /88   Pulse 74   Temp 97.9 °F (36.6 °C) (Temporal)   Ht 5' 5" (1.651 m)   Wt 84.7 kg (186 lb 11.7 oz)   SpO2 98%   BMI 31.07 kg/m²   Will increase the Bystolic to 20 mg daily and continue Valsartan 320 mg daily and recheck 3 months.    ####ADDENDUM 8/18/22: patient called and states he was accidentally taking the Amlodipine 10 mg daily that caused the swelling of ankles - wants to go back to Amlodipine 5 mg daily with Bystolic 10 mg daily.  Agreed that patient could take  Valsartan 320 mg daily  Amlodipine 5 mg daily  Bystolic 10 mg daily  And see if he does not have the ankle swelling.     He had accidentally went back to the Amlodipine 10 mg tablets and that was what was causing the swelling.                    Hyperlipidemia associated with Type 2 Diabetes  Prescribed Rosuvastatin 40 mg daily with Fenofibrate 160 mg daily.    RAN OUT of Rosuvastatin over 1 month ago.  .6  Will get back on BOTH medications and recheck in 3 months. If > 70, will add on Zetia       Mildly elevated liver enzymes   Monitoring since 2018  Within range at this time.     Anxiety and Insomnia   takes Clonazepam 0.5 mg at " bedtime as needed.    Patient is well aware of safety risks as well as dependency risk associated with Benzodiazepine usage - patient FAILED on  Trazodone trial to replace Clonazepam use and refuses to try any other alternative at this time. Patient is well aware of fall risk associated with medication use.     Personal history of colon polyps.   Last colonoscopy on 8/19/2021 = DUE THIS MONTH    History of abnormal TSH  TSH high 5.77 in February 2022  Back within normal range August 2022 - 3.19    Health Maintenance:  REFUSED Pneumonia vaccine  Foot exam today  Urine ordered for next lab visit  DUE FOR COLONSCOPY - wants to get price of test.      Component      Latest Ref Rng & Units 8/10/2022 2/3/2022 7/9/2021 1/5/2021   Sodium      136 - 145 mmol/L 136 140 137 135 (L)   Potassium      3.5 - 5.1 mmol/L 5.1 4.8 5.1 4.9   Chloride      95 - 110 mmol/L 94 (L) 99 97 97   CO2      23 - 29 mmol/L 28 28 28 27   Glucose      70 - 110 mg/dL 138 (H) 121 (H) 138 (H) 134 (H)   BUN      2 - 20 mg/dL 11 10 16 10   Creatinine      0.50 - 1.40 mg/dL 0.73 0.79 0.73 0.72   Calcium      8.7 - 10.5 mg/dL 9.5 9.8 10.0 9.9   PROTEIN TOTAL      6.0 - 8.4 g/dL 7.8 7.7 7.9 7.7   Albumin      3.5 - 5.2 g/dL 4.9 5.0 4.9 4.8   BILIRUBIN TOTAL      0.1 - 1.0 mg/dL 1.0 0.7 0.4 0.7   Alkaline Phosphatase      38 - 126 U/L 50 62 65 56   AST      15 - 46 U/L 25 65 (H) 33 42   ALT      10 - 44 U/L 23 30 32 37   Anion Gap      8 - 16 mmol/L 14 13 12 11   eGFR      >60 mL/min/1.73 m:2 >60.0      Cholesterol      120 - 199 mg/dL 230 (H) 168 137 154   Triglycerides      30 - 150 mg/dL 132 142 94 132   HDL      40 - 75 mg/dL 50 41 44 55   LDL Cholesterol External      63.0 - 159.0 mg/dL 153.6 98.6 74.2 72.6   HDL/Cholesterol Ratio      20.0 - 50.0 % 21.7 24.4 32.1 35.7   Total Cholesterol/HDL Ratio      2.0 - 5.0 4.6 4.1 3.1 2.8   Non-HDL Cholesterol      mg/dL 180 127 93 99   Hemoglobin A1C External      4.0 - 5.6 % 5.9 (H) 6.0 (H) 5.9 (H) 5.8 (H)  "  Estimated Avg Glucose      68 - 131 mg/dL 123 126 123 120   TSH      0.400 - 4.000 uIU/mL 3.190 5.770 (H)  2.390        Review of Systems   Constitutional: Negative for activity change, appetite change, fatigue, fever and unexpected weight change.   HENT: Negative for congestion, ear pain, mouth sores, nosebleeds, postnasal drip, rhinorrhea, sinus pressure, sneezing, sore throat, trouble swallowing and voice change.    Eyes: Negative.    Respiratory: Negative for cough, chest tightness and shortness of breath.    Cardiovascular: Negative for chest pain, palpitations and leg swelling.   Gastrointestinal: Negative.  Negative for abdominal pain, blood in stool, constipation, diarrhea, nausea and vomiting.   Endocrine: Negative.    Genitourinary: Negative for difficulty urinating, dysuria, flank pain, hematuria and urgency.   Musculoskeletal: Negative for arthralgias, back pain, gait problem, joint swelling, myalgias and neck pain.   Skin: Negative for color change, rash and wound.   Allergic/Immunologic: Negative for immunocompromised state.   Neurological: Negative for dizziness, tremors, seizures, syncope, speech difficulty and headaches.   Hematological: Negative for adenopathy. Does not bruise/bleed easily.   Psychiatric/Behavioral: Negative for behavioral problems, dysphoric mood, sleep disturbance and suicidal ideas. The patient is not nervous/anxious.          Objective:     Vitals:    08/16/22 1056   BP: 132/84   BP Location: Right arm   Patient Position: Sitting   BP Method: Large (Manual)   Pulse: 74   Temp: 97.9 °F (36.6 °C)   TempSrc: Temporal   SpO2: 98%   Weight: 84.7 kg (186 lb 11.7 oz)   Height: 5' 5" (1.651 m)          Physical Exam  Constitutional:       General: He is not in acute distress.     Appearance: Normal appearance. He is not ill-appearing, toxic-appearing or diaphoretic.      Comments: Body mass index is 29.84 kg/m².           HENT:      Head: Normocephalic and atraumatic.      Right Ear: " External ear normal.      Left Ear: External ear normal.   Eyes:      General: No scleral icterus.        Right eye: No discharge.         Left eye: No discharge.      Extraocular Movements: Extraocular movements intact.      Pupils: Pupils are equal, round, and reactive to light.   Cardiovascular:      Rate and Rhythm: Normal rate and regular rhythm.      Heart sounds: Normal heart sounds.   Pulmonary:      Effort: Pulmonary effort is normal. No respiratory distress.      Breath sounds: No stridor. No wheezing, rhonchi or rales.   Abdominal:      General: There is no distension.      Palpations: There is no mass.      Tenderness: There is no abdominal tenderness. There is no guarding.      Hernia: No hernia is present.   Musculoskeletal:         General: No swelling or deformity. Normal range of motion.      Right lower leg: No edema.      Left lower leg: No edema.   Skin:     General: Skin is warm and dry.   Neurological:      Mental Status: He is oriented to person, place, and time.   Psychiatric:         Mood and Affect: Mood normal.         Behavior: Behavior normal.         Thought Content: Thought content normal.         Judgment: Judgment normal.           Assessment:         ICD-10-CM ICD-9-CM   1. Type 2 diabetes mellitus without complication, without long-term current use of insulin  E11.9 250.00   2. Hyperlipidemia associated with type 2 diabetes mellitus  E11.69 250.80    E78.5 272.4   3. Hypertension associated with type 2 diabetes mellitus  E11.59 250.80    I15.2 401.9   4. Class 1 obesity due to excess calories with serious comorbidity and body mass index (BMI) of 31.0 to 31.9 in adult  E66.09 278.00    Z68.31 V85.31   5. Insomnia, unspecified type  G47.00 780.52   6. Anxiety  F41.9 300.00   7. Toenail fungus  B35.1 110.1   8. Multiple polyps of sigmoid colon  K63.5 211.3       Plan:       Type 2 diabetes mellitus without complication, without long-term current use of insulin  Continue current  medication(s).  Follow up in 3 months.  -     metFORMIN (GLUCOPHAGE) 1000 MG tablet; TAKE ONE TABLET BY MOUTH TWICE A DAY WITH A MEAL  Dispense: 180 tablet; Refill: 1  -     Comprehensive Metabolic Panel; Future; Expected date: 08/16/2022  -     Hemoglobin A1C; Future; Expected date: 08/16/2022  -     Microalbumin/Creatinine Ratio, Urine; Future; Expected date: 08/16/2022    Hyperlipidemia associated with type 2 diabetes mellitus  GET BACK ON Rosuvastatin 40 mg daily and STAY ON Fenofibrate 160 mg daily ane recheck in 3 months.  -     rosuvastatin (CRESTOR) 40 MG Tab; Take 1 tablet (40 mg total) by mouth once daily.  Dispense: 90 tablet; Refill: 1  -     Lipid Panel; Future; Expected date: 08/16/2022    Hypertension associated with type 2 diabetes mellitus  Continue Valsartan 320 mg daily  INCREASE Bystolic to 20 mg daily  Recheck in 3 months.  -     nebivoloL (BYSTOLIC) 20 mg Tab; Take 20 mg by mouth once daily.  Dispense: 90 tablet; Refill: 1    ####ADDENDUM 8/18/22: patient called and states he was accidentally taking the Amlodipine 10 mg daily that caused the swelling of ankles - wants to go back to Amlodipine 5 mg daily with Bystolic 10 mg daily.  Agreed that patient could take  Valsartan 320 mg daily  Amlodipine 5 mg daily  Bystolic 10 mg daily  And see if he does not have the ankle swelling.     He had accidentally went back to the Amlodipine 10 mg tablets and that was what was causing the swelling.                Class 1 obesity due to excess calories with serious comorbidity and body mass index (BMI) of 31.0 to 31.9 in adult  Work on weight loss    Insomnia, unspecified type  Clonazepam prn    Anxiety    Toenail fungus  Apply as directed.  -     ciclopirox (PENLAC) 8 % Soln; Apply topically nightly. Clean nail with alcohol every 7 days.  Dispense: 6.6 mL; Refill: 2    Multiple polyps of sigmoid colon  Schedule colonoscopy with Dr. Perdomo.      Follow up in about 3 months (around 11/16/2022) for labs and  follow up.     Patient's Medications   New Prescriptions    CICLOPIROX (PENLAC) 8 % SOLN    Apply topically nightly. Clean nail with alcohol every 7 days.    NEBIVOLOL (BYSTOLIC) 20 MG TAB    Take 20 mg by mouth once daily.   Previous Medications    ASPIRIN (ECOTRIN) 81 MG EC TABLET    Take 1 tablet (81 mg total) by mouth once daily.    CLONAZEPAM (KLONOPIN) 0.5 MG TABLET    TAKE 1 TABLET (0.5 MG TOTAL) BY MOUTH NIGHTLY AS NEEDED FOR ANXIETY.    CYANOCOBALAMIN (VITAMIN B-12) 100 MCG TABLET    Take 100 mcg by mouth once daily.    FENOFIBRATE 160 MG TAB    TAKE ONE TABLET BY MOUTH EVERY EVENING    GARLIC 400 MG TAB    Take by mouth.    MOMETASONE (NASONEX) 50 MCG/ACTUATION NASAL SPRAY    2 sprays by Nasal route once daily.    PSYLLIUM 0.52 GRAM CAPSULE    Take 3 capsules by mouth 2 (two) times daily.    VALSARTAN (DIOVAN) 320 MG TABLET    Take 1 tablet (320 mg total) by mouth once daily.    VITAMIN D 1000 UNITS TAB    Take 4,000 Units by mouth once daily.   Modified Medications    Modified Medication Previous Medication    METFORMIN (GLUCOPHAGE) 1000 MG TABLET metFORMIN (GLUCOPHAGE) 1000 MG tablet       TAKE ONE TABLET BY MOUTH TWICE A DAY WITH A MEAL    TAKE ONE TABLET BY MOUTH TWICE A DAY WITH A MEAL    ROSUVASTATIN (CRESTOR) 40 MG TAB rosuvastatin (CRESTOR) 40 MG Tab       Take 1 tablet (40 mg total) by mouth once daily.    TAKE ONE TABLET BY MOUTH EVERY DAY   Discontinued Medications    AMLODIPINE (NORVASC) 5 MG TABLET    Take 1 tablet (5 mg total) by mouth once daily.    NEBIVOLOL (BYSTOLIC) 10 MG TAB    Take 1 tablet (10 mg total) by mouth once daily.       Past Medical History:   Diagnosis Date    Anxiety     DM (diabetes mellitus) 11/4/2014    Hyperlipidemia     Hypertension     Impaired fasting glucose     Metabolic syndrome        Past Surgical History:   Procedure Laterality Date    COLONOSCOPY  2007    + polyp - tubular adenoma - Dr. Hernandez    COLONOSCOPY N/A 8/19/2021    Procedure: COLONOSCOPY;   Surgeon: CEE Perdomo MD;  Location: [facility];  Service: Endoscopy;  Laterality: N/A;       Family History   Problem Relation Age of Onset    Cancer Mother         Lung Cancer and Colon Cancer    Heart disease Father 49        MI    Diabetes Father     No Known Problems Daughter     No Known Problems Son     No Known Problems Son        Social History     Socioeconomic History    Marital status:    Occupational History    Occupation: retired     Employer: [employer]   Tobacco Use    Smoking status: Former Smoker     Packs/day: 1.00     Years: 15.00     Pack years: 15.00     Types: Cigarettes     Quit date: 1994     Years since quittin.6    Smokeless tobacco: Former User     Types: Chew    Tobacco comment: Quit in    Substance and Sexual Activity    Alcohol use: Yes     Comment: Reports 1 to 2 beers per day on weekdays and 10 beers per day on weekend days    Drug use: No    Sexual activity: Yes     Partners: Female

## 2022-08-16 NOTE — TELEPHONE ENCOUNTER
Patient was seen in office today- per NP Susie will like to see patient back in 3 months with a urine- I called patient to schedule- he didn't answer-I left message to call office.

## 2022-08-16 NOTE — TELEPHONE ENCOUNTER
Spoke to Bartolome Esquivel Jr., wanted to know how much it would cost to have colonoscopy done, explained that we would need to schedule the procedure in order for the central pricing office to give him a quote. States will call insurance company and call back once he knows

## 2022-08-18 ENCOUNTER — TELEPHONE (OUTPATIENT)
Dept: FAMILY MEDICINE | Facility: CLINIC | Age: 67
End: 2022-08-18
Payer: MEDICARE

## 2022-08-18 DIAGNOSIS — I15.2 HYPERTENSION ASSOCIATED WITH TYPE 2 DIABETES MELLITUS: ICD-10-CM

## 2022-08-18 DIAGNOSIS — E11.59 HYPERTENSION ASSOCIATED WITH TYPE 2 DIABETES MELLITUS: ICD-10-CM

## 2022-08-18 RX ORDER — AMLODIPINE BESYLATE 5 MG/1
5 TABLET ORAL DAILY
Qty: 30 TABLET | Refills: 0
Start: 2022-08-18 | End: 2022-11-15 | Stop reason: SINTOL

## 2022-08-18 RX ORDER — NEBIVOLOL 20 MG/1
10 TABLET ORAL DAILY
Start: 2022-08-18 | End: 2022-09-30 | Stop reason: DRUGHIGH

## 2022-08-18 NOTE — TELEPHONE ENCOUNTER
----- Message from Harley Cabrera sent at 8/18/2022 11:31 AM CDT -----  Regarding: speak with nurse  Type:  Patient Returning Call    Who Called:patient     Who Left Message for Patient:n/a    Does the patient know what this is regarding?:speak with nurse    Would the patient rather a call back or a response via [facility]? Call back    Best Call Back Number:[phone]  Additional Information: n/a

## 2022-08-18 NOTE — TELEPHONE ENCOUNTER
Patient wanted to discuss with you he was mistakenly taking the wrong dosage of Amlodipine instead of taking 5mg he was taking 10mg- patient wanted if he can start back taking Amlodipine 5mg instead the the Nebivolol 20mg.

## 2022-08-18 NOTE — TELEPHONE ENCOUNTER
Agreed that patient could take  Valsartan 320 mg daily  Amlodipine 5 mg daily  Bystolic 10 mg daily  And see if he does not have the ankle swelling.    He had accidentally went back to the Amlodipine 10 mg tablets and that was what was causing the swelling.

## 2022-09-26 ENCOUNTER — PATIENT MESSAGE (OUTPATIENT)
Dept: ADMINISTRATIVE | Facility: HOSPITAL | Age: 67
End: 2022-09-26
Payer: MEDICARE

## 2022-09-26 ENCOUNTER — PATIENT OUTREACH (OUTPATIENT)
Dept: ADMINISTRATIVE | Facility: HOSPITAL | Age: 67
End: 2022-09-26
Payer: MEDICARE

## 2022-09-26 NOTE — PROGRESS NOTES
09/26/2022 Gap report audit performed. Care Everywhere updates requested and reviewed  Overdue HM topic chart audit and/or requested. LINKS triggered and reconciled. Media reviewed  Patient outreach regarding Health Maintenance- Appt sched  No Current ADITYA on record     Health Maintenance Due   Topic Date Due    COVID-19 Vaccine (4 - Booster for Moderna series) 02/20/2022    Diabetes Urine Screening  07/09/2022    Colorectal Cancer Screening  08/19/2022    Influenza Vaccine (1) 09/01/2022

## 2022-10-03 ENCOUNTER — TELEPHONE (OUTPATIENT)
Dept: ENDOSCOPY | Facility: HOSPITAL | Age: 67
End: 2022-10-03
Payer: MEDICARE

## 2022-10-03 NOTE — TELEPHONE ENCOUNTER
Attempted to contact Bartolome Esquivel Jr.  to schedule procedure , no answer, left message with call back number

## 2022-11-10 ENCOUNTER — TELEPHONE (OUTPATIENT)
Dept: ENDOSCOPY | Facility: HOSPITAL | Age: 67
End: 2022-11-10
Payer: MEDICARE

## 2022-11-15 ENCOUNTER — OFFICE VISIT (OUTPATIENT)
Dept: FAMILY MEDICINE | Facility: CLINIC | Age: 67
End: 2022-11-15
Payer: MEDICARE

## 2022-11-15 VITALS
TEMPERATURE: 98 F | HEART RATE: 62 BPM | DIASTOLIC BLOOD PRESSURE: 85 MMHG | OXYGEN SATURATION: 98 % | WEIGHT: 194.44 LBS | BODY MASS INDEX: 32.4 KG/M2 | SYSTOLIC BLOOD PRESSURE: 132 MMHG | HEIGHT: 65 IN

## 2022-11-15 DIAGNOSIS — E11.69 HYPERLIPIDEMIA ASSOCIATED WITH TYPE 2 DIABETES MELLITUS: ICD-10-CM

## 2022-11-15 DIAGNOSIS — E78.5 HYPERLIPIDEMIA ASSOCIATED WITH TYPE 2 DIABETES MELLITUS: ICD-10-CM

## 2022-11-15 DIAGNOSIS — G47.00 INSOMNIA, UNSPECIFIED TYPE: ICD-10-CM

## 2022-11-15 DIAGNOSIS — F41.9 ANXIETY: ICD-10-CM

## 2022-11-15 DIAGNOSIS — E11.59 HYPERTENSION ASSOCIATED WITH TYPE 2 DIABETES MELLITUS: ICD-10-CM

## 2022-11-15 DIAGNOSIS — I15.2 HYPERTENSION ASSOCIATED WITH TYPE 2 DIABETES MELLITUS: ICD-10-CM

## 2022-11-15 DIAGNOSIS — E11.9 TYPE 2 DIABETES MELLITUS WITHOUT COMPLICATION, WITHOUT LONG-TERM CURRENT USE OF INSULIN: Primary | ICD-10-CM

## 2022-11-15 DIAGNOSIS — E66.09 CLASS 1 OBESITY DUE TO EXCESS CALORIES WITH SERIOUS COMORBIDITY AND BODY MASS INDEX (BMI) OF 32.0 TO 32.9 IN ADULT: ICD-10-CM

## 2022-11-15 DIAGNOSIS — K63.5 MULTIPLE POLYPS OF SIGMOID COLON: ICD-10-CM

## 2022-11-15 DIAGNOSIS — Z12.5 SCREENING PSA (PROSTATE SPECIFIC ANTIGEN): ICD-10-CM

## 2022-11-15 PROBLEM — R74.8 ELEVATED LIVER ENZYMES: Status: RESOLVED | Noted: 2018-10-26 | Resolved: 2022-11-15

## 2022-11-15 PROCEDURE — 1160F RVW MEDS BY RX/DR IN RCRD: CPT | Mod: CPTII,S$GLB,, | Performed by: NURSE PRACTITIONER

## 2022-11-15 PROCEDURE — 1159F MED LIST DOCD IN RCRD: CPT | Mod: CPTII,S$GLB,, | Performed by: NURSE PRACTITIONER

## 2022-11-15 PROCEDURE — 99214 PR OFFICE/OUTPT VISIT, EST, LEVL IV, 30-39 MIN: ICD-10-PCS | Mod: S$GLB,,, | Performed by: NURSE PRACTITIONER

## 2022-11-15 PROCEDURE — 3075F SYST BP GE 130 - 139MM HG: CPT | Mod: CPTII,S$GLB,, | Performed by: NURSE PRACTITIONER

## 2022-11-15 PROCEDURE — 3288F PR FALLS RISK ASSESSMENT DOCUMENTED: ICD-10-PCS | Mod: CPTII,S$GLB,, | Performed by: NURSE PRACTITIONER

## 2022-11-15 PROCEDURE — 4010F PR ACE/ARB THEARPY RXD/TAKEN: ICD-10-PCS | Mod: CPTII,S$GLB,, | Performed by: NURSE PRACTITIONER

## 2022-11-15 PROCEDURE — 3061F NEG MICROALBUMINURIA REV: CPT | Mod: CPTII,S$GLB,, | Performed by: NURSE PRACTITIONER

## 2022-11-15 PROCEDURE — 3075F PR MOST RECENT SYSTOLIC BLOOD PRESS GE 130-139MM HG: ICD-10-PCS | Mod: CPTII,S$GLB,, | Performed by: NURSE PRACTITIONER

## 2022-11-15 PROCEDURE — 3008F PR BODY MASS INDEX (BMI) DOCUMENTED: ICD-10-PCS | Mod: CPTII,S$GLB,, | Performed by: NURSE PRACTITIONER

## 2022-11-15 PROCEDURE — 3066F NEPHROPATHY DOC TX: CPT | Mod: CPTII,S$GLB,, | Performed by: NURSE PRACTITIONER

## 2022-11-15 PROCEDURE — 3008F BODY MASS INDEX DOCD: CPT | Mod: CPTII,S$GLB,, | Performed by: NURSE PRACTITIONER

## 2022-11-15 PROCEDURE — 99999 PR PBB SHADOW E&M-EST. PATIENT-LVL IV: ICD-10-PCS | Mod: PBBFAC,,, | Performed by: NURSE PRACTITIONER

## 2022-11-15 PROCEDURE — 3066F PR DOCUMENTATION OF TREATMENT FOR NEPHROPATHY: ICD-10-PCS | Mod: CPTII,S$GLB,, | Performed by: NURSE PRACTITIONER

## 2022-11-15 PROCEDURE — 3079F PR MOST RECENT DIASTOLIC BLOOD PRESSURE 80-89 MM HG: ICD-10-PCS | Mod: CPTII,S$GLB,, | Performed by: NURSE PRACTITIONER

## 2022-11-15 PROCEDURE — 1159F PR MEDICATION LIST DOCUMENTED IN MEDICAL RECORD: ICD-10-PCS | Mod: CPTII,S$GLB,, | Performed by: NURSE PRACTITIONER

## 2022-11-15 PROCEDURE — 1101F PR PT FALLS ASSESS DOC 0-1 FALLS W/OUT INJ PAST YR: ICD-10-PCS | Mod: CPTII,S$GLB,, | Performed by: NURSE PRACTITIONER

## 2022-11-15 PROCEDURE — 99214 OFFICE O/P EST MOD 30 MIN: CPT | Mod: S$GLB,,, | Performed by: NURSE PRACTITIONER

## 2022-11-15 PROCEDURE — 3044F PR MOST RECENT HEMOGLOBIN A1C LEVEL <7.0%: ICD-10-PCS | Mod: CPTII,S$GLB,, | Performed by: NURSE PRACTITIONER

## 2022-11-15 PROCEDURE — 3061F PR NEG MICROALBUMINURIA RESULT DOCUMENTED/REVIEW: ICD-10-PCS | Mod: CPTII,S$GLB,, | Performed by: NURSE PRACTITIONER

## 2022-11-15 PROCEDURE — 1160F PR REVIEW ALL MEDS BY PRESCRIBER/CLIN PHARMACIST DOCUMENTED: ICD-10-PCS | Mod: CPTII,S$GLB,, | Performed by: NURSE PRACTITIONER

## 2022-11-15 PROCEDURE — 1101F PT FALLS ASSESS-DOCD LE1/YR: CPT | Mod: CPTII,S$GLB,, | Performed by: NURSE PRACTITIONER

## 2022-11-15 PROCEDURE — 99499 RISK ADDL DX/OHS AUDIT: ICD-10-PCS | Mod: S$GLB,,, | Performed by: NURSE PRACTITIONER

## 2022-11-15 PROCEDURE — 4010F ACE/ARB THERAPY RXD/TAKEN: CPT | Mod: CPTII,S$GLB,, | Performed by: NURSE PRACTITIONER

## 2022-11-15 PROCEDURE — 3079F DIAST BP 80-89 MM HG: CPT | Mod: CPTII,S$GLB,, | Performed by: NURSE PRACTITIONER

## 2022-11-15 PROCEDURE — 3044F HG A1C LEVEL LT 7.0%: CPT | Mod: CPTII,S$GLB,, | Performed by: NURSE PRACTITIONER

## 2022-11-15 PROCEDURE — 99499 UNLISTED E&M SERVICE: CPT | Mod: S$GLB,,, | Performed by: NURSE PRACTITIONER

## 2022-11-15 PROCEDURE — 3288F FALL RISK ASSESSMENT DOCD: CPT | Mod: CPTII,S$GLB,, | Performed by: NURSE PRACTITIONER

## 2022-11-15 PROCEDURE — 99999 PR PBB SHADOW E&M-EST. PATIENT-LVL IV: CPT | Mod: PBBFAC,,, | Performed by: NURSE PRACTITIONER

## 2022-11-15 RX ORDER — CLONAZEPAM 0.5 MG/1
0.5 TABLET ORAL NIGHTLY PRN
Qty: 30 TABLET | Refills: 2 | Status: SHIPPED | OUTPATIENT
Start: 2022-11-15 | End: 2023-02-09

## 2022-11-15 RX ORDER — VALSARTAN 320 MG/1
320 TABLET ORAL DAILY
Qty: 90 TABLET | Refills: 1 | Status: SHIPPED | OUTPATIENT
Start: 2022-11-15 | End: 2023-05-12

## 2022-11-15 RX ORDER — NEBIVOLOL 10 MG/1
20 TABLET ORAL DAILY
Qty: 180 TABLET | Refills: 0 | Status: SHIPPED | OUTPATIENT
Start: 2022-11-15 | End: 2022-12-30

## 2022-11-15 NOTE — PROGRESS NOTES
"Subjective:       Patient ID: Bartolome Esquivel Jr. is a 67 y.o. male.    Chief Complaint: Follow-up (3 months F/U)    HPI    Patient is a 67 year old white male with Type 2 Diabetes, Hypertension, Hyperlipidemia, Anxiety, Insomnia, chronic mildly elevated liver enzymes, chronic arthralgia, and personal history of colon polyps that is here today for 3 month follow up with fasting lab results.     Type 2 Diabetes   controlled on Metformin 1000 mg twice daily   with hemoglobin A1c 5.9%.   Advised to get EYE exam now  Declined pneumonia vaccine     Hypertension  Prescribed Valsartan 320 mg daily and Bystolic 10 mg  TWO tablets daily (cheaper than 20 mg once daily)  Patient STOPPED Amlodipine months ago due to Ankle Swelling  Unable to take HCTZ due to pain with urination  /85   Pulse 62   Temp 97.7 °F (36.5 °C) (Temporal)   Ht 5' 5" (1.651 m)   Wt 88.2 kg (194 lb 7.1 oz)   SpO2 98%   BMI 32.36 kg/m²        Hyperlipidemia associated with Type 2 Diabetes  Prescribed Rosuvastatin 40 mg daily with Fenofibrate 160 mg daily.    RAN OUT of Rosuvastatin over 1 month ago in July 2022 so levels were back up with .6  Back on BOTH medications and levels controlled  LDL 59.8        Mildly elevated liver enzymes   Monitoring since 2018  Within range at this time.     Anxiety and Insomnia   takes Clonazepam 0.5 mg at bedtime as needed.    Patient is well aware of safety risks as well as dependency risk associated with Benzodiazepine usage - patient FAILED on  Trazodone trial to replace Clonazepam use and refuses to try any other alternative at this time. Patient is well aware of fall risk associated with medication use.     Personal history of colon polyps.   Last colonoscopy on 8/19/2021 = DUE to repeat 1 year - plans to schedule EARLY 2023     History of abnormal TSH  TSH high 5.77 in February 2022  Back within normal range August 2022 - 3.19  Will monitor with yearly wellness exam    Component      Latest " Ref Rng & Units 11/10/2022 8/10/2022 2/3/2022   Sodium      136 - 145 mmol/L 134 (L) 136 140   Potassium      3.5 - 5.1 mmol/L 4.2 5.1 4.8   Chloride      95 - 110 mmol/L 97 94 (L) 99   CO2      23 - 29 mmol/L 26 28 28   Glucose      70 - 110 mg/dL 131 (H) 138 (H) 121 (H)   BUN      2 - 20 mg/dL 11 11 10   Creatinine      0.50 - 1.40 mg/dL 0.70 0.73 0.79   Calcium      8.7 - 10.5 mg/dL 9.2 9.5 9.8   PROTEIN TOTAL      6.0 - 8.4 g/dL 7.9 7.8 7.7   Albumin      3.5 - 5.2 g/dL 4.8 4.9 5.0   BILIRUBIN TOTAL      0.1 - 1.0 mg/dL 0.6 1.0 0.7   Alkaline Phosphatase      38 - 126 U/L 57 50 62   AST      15 - 46 U/L 23 25 65 (H)   ALT      10 - 44 U/L 22 23 30   Anion Gap      8 - 16 mmol/L 11 14 13   eGFR      >60 mL/min/1.73 m:2 >60.0 >60.0    Cholesterol      120 - 199 mg/dL 121 230 (H) 168   Triglycerides      30 - 150 mg/dL 86 132 142   HDL      40 - 75 mg/dL 44 50 41   LDL Cholesterol External      63.0 - 159.0 mg/dL 59.8 (L) 153.6 98.6   HDL/Cholesterol Ratio      20.0 - 50.0 % 36.4 21.7 24.4   Total Cholesterol/HDL Ratio      2.0 - 5.0 2.8 4.6 4.1   Non-HDL Cholesterol      mg/dL 77 180 127   Hemoglobin A1C External      4.0 - 5.6 % 5.9 (H) 5.9 (H) 6.0 (H)   Estimated Avg Glucose      68 - 131 mg/dL 123 123 126     Component      Latest Ref Rng & Units 11/10/2022 7/9/2021   Microalbumin, Urine      ug/mL 11.0 18.0   Creatinine, Urine      23.0 - 375.0 mg/dL 99.0 128.0   MICROALB/CREAT RATIO      0.0 - 30.0 ug/mg 11.1 14.1       Review of Systems   Constitutional:  Negative for activity change, appetite change, fatigue, fever and unexpected weight change.   HENT:  Negative for congestion, ear pain, mouth sores, nosebleeds, postnasal drip, rhinorrhea, sinus pressure, sneezing, sore throat, trouble swallowing and voice change.    Eyes: Negative.    Respiratory:  Negative for cough, chest tightness and shortness of breath.    Cardiovascular:  Negative for chest pain, palpitations and leg swelling.   Gastrointestinal:  "Negative.  Negative for abdominal pain, blood in stool, constipation, diarrhea, nausea and vomiting.   Endocrine: Negative.    Genitourinary:  Negative for difficulty urinating, dysuria, flank pain, hematuria and urgency.   Musculoskeletal:  Negative for arthralgias, back pain, gait problem, joint swelling, myalgias and neck pain.   Skin:  Negative for color change, rash and wound.   Allergic/Immunologic: Negative for immunocompromised state.   Neurological:  Negative for dizziness, tremors, seizures, syncope, speech difficulty and headaches.   Hematological:  Negative for adenopathy. Does not bruise/bleed easily.   Psychiatric/Behavioral:  Negative for behavioral problems, dysphoric mood, sleep disturbance and suicidal ideas. The patient is not nervous/anxious.        Objective:     Vitals:    11/15/22 0819 11/15/22 0833   BP: (!) 134/90 132/85   BP Location: Left arm    Patient Position: Sitting    BP Method: Large (Manual)    Pulse: 62    Temp: 97.7 °F (36.5 °C)    TempSrc: Temporal    SpO2: 98%    Weight: 88.2 kg (194 lb 7.1 oz)    Height: 5' 5" (1.651 m)           Physical Exam  Constitutional:       General: He is not in acute distress.     Appearance: Normal appearance. He is not ill-appearing, toxic-appearing or diaphoretic.      Comments: Body mass index is 32.36 kg/m².         HENT:      Head: Normocephalic and atraumatic.      Right Ear: External ear normal.      Left Ear: External ear normal.   Eyes:      General: No scleral icterus.        Right eye: No discharge.         Left eye: No discharge.      Extraocular Movements: Extraocular movements intact.      Pupils: Pupils are equal, round, and reactive to light.   Cardiovascular:      Rate and Rhythm: Normal rate and regular rhythm.      Heart sounds: Normal heart sounds.   Pulmonary:      Effort: Pulmonary effort is normal. No respiratory distress.      Breath sounds: No stridor. No wheezing, rhonchi or rales.   Abdominal:      General: There is no " distension.      Palpations: There is no mass.      Tenderness: There is no abdominal tenderness. There is no guarding.      Hernia: No hernia is present.   Musculoskeletal:         General: No swelling or deformity. Normal range of motion.      Right lower leg: No edema.      Left lower leg: No edema.   Skin:     General: Skin is warm and dry.   Neurological:      Mental Status: He is oriented to person, place, and time.   Psychiatric:         Mood and Affect: Mood normal.         Behavior: Behavior normal.         Thought Content: Thought content normal.         Judgment: Judgment normal.         Assessment:         ICD-10-CM ICD-9-CM   1. Type 2 diabetes mellitus without complication, without long-term current use of insulin  E11.9 250.00   2. Hypertension associated with type 2 diabetes mellitus  E11.59 250.80    I15.2 401.9   3. Hyperlipidemia associated with type 2 diabetes mellitus  E11.69 250.80    E78.5 272.4   4. Class 1 obesity due to excess calories with serious comorbidity and body mass index (BMI) of 32.0 to 32.9 in adult  E66.09 278.00    Z68.32 V85.32   5. Multiple polyps of sigmoid colon  K63.5 211.3   6. Anxiety  F41.9 300.00   7. Insomnia, unspecified type  G47.00 780.52   8. Screening PSA (prostate specific antigen)  Z12.5 V76.44       Plan:       Type 2 diabetes mellitus without complication, without long-term current use of insulin  Continue current medication(s).  Follow up in 6 months.    -     Comprehensive Metabolic Panel; Future; Expected date: 11/15/2022  -     Hemoglobin A1C; Future; Expected date: 11/15/2022    Hypertension associated with type 2 diabetes mellitus  Continue current medication(s).  Follow up in 6 months.    -     nebivoloL (BYSTOLIC) 10 MG Tab; Take 2 tablets (20 mg total) by mouth once daily.  Dispense: 180 tablet; Refill: 0  -     valsartan (DIOVAN) 320 MG tablet; Take 1 tablet (320 mg total) by mouth once daily.  Dispense: 90 tablet; Refill: 1  -     TSH; Future;  Expected date: 11/15/2022    Hyperlipidemia associated with type 2 diabetes mellitus  Continue current medication(s).  Follow up in 6 months.    -     Lipid Panel; Future; Expected date: 11/15/2022    Class 1 obesity due to excess calories with serious comorbidity and body mass index (BMI) of 32.0 to 32.9 in adult  -     CBC Auto Differential; Future; Expected date: 11/15/2022    Multiple polyps of sigmoid colon  To schedule colonoscopy early January 2023    Anxiety  Controlled on Clonazepam prn    Insomnia, unspecified type  -     clonazePAM (KLONOPIN) 0.5 MG tablet; Take 1 tablet (0.5 mg total) by mouth nightly as needed for Anxiety.  Dispense: 30 tablet; Refill: 2    Screening PSA (prostate specific antigen)  -     PSA, Screening; Future; Expected date: 11/15/2022    Follow up in about 4 months (around 3/15/2023) for fasting labs and MEDICARE WELLNESS EXAM.     Patient's Medications   New Prescriptions    No medications on file   Previous Medications    ASPIRIN (ECOTRIN) 81 MG EC TABLET    Take 1 tablet (81 mg total) by mouth once daily.    CICLOPIROX (PENLAC) 8 % SOLN    Apply topically nightly. Clean nail with alcohol every 7 days.    CYANOCOBALAMIN (VITAMIN B-12) 100 MCG TABLET    Take 100 mcg by mouth once daily.    FENOFIBRATE 160 MG TAB    TAKE ONE TABLET BY MOUTH EVERY EVENING    GARLIC 400 MG TAB    Take by mouth.    METFORMIN (GLUCOPHAGE) 1000 MG TABLET    TAKE ONE TABLET BY MOUTH TWICE A DAY WITH A MEAL    MOMETASONE (NASONEX) 50 MCG/ACTUATION NASAL SPRAY    2 sprays by Nasal route once daily.    PSYLLIUM 0.52 GRAM CAPSULE    Take 3 capsules by mouth 2 (two) times daily.    ROSUVASTATIN (CRESTOR) 40 MG TAB    Take 1 tablet (40 mg total) by mouth once daily.    VITAMIN D 1000 UNITS TAB    Take 4,000 Units by mouth once daily.   Modified Medications    Modified Medication Previous Medication    CLONAZEPAM (KLONOPIN) 0.5 MG TABLET clonazePAM (KLONOPIN) 0.5 MG tablet       Take 1 tablet (0.5 mg total) by  mouth nightly as needed for Anxiety.    TAKE 1 TABLET (0.5 MG TOTAL) BY MOUTH NIGHTLY AS NEEDED FOR ANXIETY.    NEBIVOLOL (BYSTOLIC) 10 MG TAB nebivoloL (BYSTOLIC) 10 MG Tab       Take 2 tablets (20 mg total) by mouth once daily.    TAKE ONE TABLET BY MOUTH EVERY DAY    VALSARTAN (DIOVAN) 320 MG TABLET valsartan (DIOVAN) 320 MG tablet       Take 1 tablet (320 mg total) by mouth once daily.    TAKE ONE TABLET BY MOUTH EVERY DAY   Discontinued Medications    AMLODIPINE (NORVASC) 5 MG TABLET    Take 1 tablet (5 mg total) by mouth once daily.       Past Medical History:   Diagnosis Date    Anxiety     DM (diabetes mellitus) 2014    Hyperlipidemia     Hypertension     Impaired fasting glucose     Metabolic syndrome        Past Surgical History:   Procedure Laterality Date    COLONOSCOPY      + polyp - tubular adenoma - Dr. Hernandez    COLONOSCOPY N/A 2021    Procedure: COLONOSCOPY;  Surgeon: CEE Perdomo MD;  Location: [facility];  Service: Endoscopy;  Laterality: N/A;       Family History   Problem Relation Age of Onset    Cancer Mother         Lung Cancer and Colon Cancer    Heart disease Father 49        MI    Diabetes Father     No Known Problems Daughter     No Known Problems Son     No Known Problems Son        Social History     Socioeconomic History    Marital status:    Occupational History    Occupation: retired     Employer: [employer]   Tobacco Use    Smoking status: Former     Packs/day: 1.00     Years: 15.00     Pack years: 15.00     Types: Cigarettes     Quit date: 1994     Years since quittin.8    Smokeless tobacco: Former     Types: Chew    Tobacco comments:     Quit in    Substance and Sexual Activity    Alcohol use: Yes     Comment: Reports 1 to 2 beers per day on weekdays and 10 beers per day on weekend days    Drug use: No    Sexual activity: Yes     Partners: Female

## 2022-12-12 ENCOUNTER — TELEPHONE (OUTPATIENT)
Dept: ENDOSCOPY | Facility: HOSPITAL | Age: 67
End: 2022-12-12
Payer: MEDICARE

## 2022-12-27 ENCOUNTER — PES CALL (OUTPATIENT)
Dept: ADMINISTRATIVE | Facility: OTHER | Age: 67
End: 2022-12-27
Payer: MEDICARE

## 2023-02-07 DIAGNOSIS — Z00.00 ENCOUNTER FOR MEDICARE ANNUAL WELLNESS EXAM: ICD-10-CM

## 2023-02-09 DIAGNOSIS — Z00.00 ENCOUNTER FOR MEDICARE ANNUAL WELLNESS EXAM: ICD-10-CM

## 2023-03-08 ENCOUNTER — TELEPHONE (OUTPATIENT)
Dept: FAMILY MEDICINE | Facility: CLINIC | Age: 68
End: 2023-03-08
Payer: MEDICARE

## 2023-03-22 ENCOUNTER — OFFICE VISIT (OUTPATIENT)
Dept: FAMILY MEDICINE | Facility: CLINIC | Age: 68
End: 2023-03-22
Payer: MEDICARE

## 2023-03-22 VITALS
TEMPERATURE: 98 F | WEIGHT: 194.69 LBS | SYSTOLIC BLOOD PRESSURE: 136 MMHG | DIASTOLIC BLOOD PRESSURE: 82 MMHG | BODY MASS INDEX: 30.56 KG/M2 | HEIGHT: 67 IN | HEART RATE: 62 BPM | OXYGEN SATURATION: 97 %

## 2023-03-22 DIAGNOSIS — E11.9 TYPE 2 DIABETES MELLITUS WITHOUT COMPLICATION, WITHOUT LONG-TERM CURRENT USE OF INSULIN: ICD-10-CM

## 2023-03-22 DIAGNOSIS — K63.5 MULTIPLE POLYPS OF SIGMOID COLON: ICD-10-CM

## 2023-03-22 DIAGNOSIS — I15.2 HYPERTENSION ASSOCIATED WITH TYPE 2 DIABETES MELLITUS: ICD-10-CM

## 2023-03-22 DIAGNOSIS — G47.00 INSOMNIA, UNSPECIFIED TYPE: ICD-10-CM

## 2023-03-22 DIAGNOSIS — F41.9 ANXIETY: ICD-10-CM

## 2023-03-22 DIAGNOSIS — E78.5 HYPERLIPIDEMIA ASSOCIATED WITH TYPE 2 DIABETES MELLITUS: ICD-10-CM

## 2023-03-22 DIAGNOSIS — E11.59 HYPERTENSION ASSOCIATED WITH TYPE 2 DIABETES MELLITUS: ICD-10-CM

## 2023-03-22 DIAGNOSIS — Z00.00 MEDICARE ANNUAL WELLNESS VISIT, SUBSEQUENT: Primary | ICD-10-CM

## 2023-03-22 DIAGNOSIS — E11.69 HYPERLIPIDEMIA ASSOCIATED WITH TYPE 2 DIABETES MELLITUS: ICD-10-CM

## 2023-03-22 DIAGNOSIS — R79.89 ABNORMAL TSH: ICD-10-CM

## 2023-03-22 DIAGNOSIS — E66.09 CLASS 1 OBESITY DUE TO EXCESS CALORIES WITH SERIOUS COMORBIDITY AND BODY MASS INDEX (BMI) OF 30.0 TO 30.9 IN ADULT: ICD-10-CM

## 2023-03-22 PROCEDURE — 4010F PR ACE/ARB THEARPY RXD/TAKEN: ICD-10-PCS | Mod: HCNC,CPTII,S$GLB, | Performed by: NURSE PRACTITIONER

## 2023-03-22 PROCEDURE — 1159F MED LIST DOCD IN RCRD: CPT | Mod: HCNC,CPTII,S$GLB, | Performed by: NURSE PRACTITIONER

## 2023-03-22 PROCEDURE — 3044F HG A1C LEVEL LT 7.0%: CPT | Mod: HCNC,CPTII,S$GLB, | Performed by: NURSE PRACTITIONER

## 2023-03-22 PROCEDURE — 3075F SYST BP GE 130 - 139MM HG: CPT | Mod: HCNC,CPTII,S$GLB, | Performed by: NURSE PRACTITIONER

## 2023-03-22 PROCEDURE — 1126F AMNT PAIN NOTED NONE PRSNT: CPT | Mod: HCNC,CPTII,S$GLB, | Performed by: NURSE PRACTITIONER

## 2023-03-22 PROCEDURE — G0439 PR MEDICARE ANNUAL WELLNESS SUBSEQUENT VISIT: ICD-10-PCS | Mod: HCNC,S$GLB,, | Performed by: NURSE PRACTITIONER

## 2023-03-22 PROCEDURE — 1160F PR REVIEW ALL MEDS BY PRESCRIBER/CLIN PHARMACIST DOCUMENTED: ICD-10-PCS | Mod: HCNC,CPTII,S$GLB, | Performed by: NURSE PRACTITIONER

## 2023-03-22 PROCEDURE — 99999 PR PBB SHADOW E&M-EST. PATIENT-LVL IV: ICD-10-PCS | Mod: PBBFAC,HCNC,, | Performed by: NURSE PRACTITIONER

## 2023-03-22 PROCEDURE — 1160F RVW MEDS BY RX/DR IN RCRD: CPT | Mod: HCNC,CPTII,S$GLB, | Performed by: NURSE PRACTITIONER

## 2023-03-22 PROCEDURE — 1126F PR PAIN SEVERITY QUANTIFIED, NO PAIN PRESENT: ICD-10-PCS | Mod: HCNC,CPTII,S$GLB, | Performed by: NURSE PRACTITIONER

## 2023-03-22 PROCEDURE — 3288F FALL RISK ASSESSMENT DOCD: CPT | Mod: HCNC,CPTII,S$GLB, | Performed by: NURSE PRACTITIONER

## 2023-03-22 PROCEDURE — 1159F PR MEDICATION LIST DOCUMENTED IN MEDICAL RECORD: ICD-10-PCS | Mod: HCNC,CPTII,S$GLB, | Performed by: NURSE PRACTITIONER

## 2023-03-22 PROCEDURE — 99999 PR PBB SHADOW E&M-EST. PATIENT-LVL IV: CPT | Mod: PBBFAC,HCNC,, | Performed by: NURSE PRACTITIONER

## 2023-03-22 PROCEDURE — 3079F PR MOST RECENT DIASTOLIC BLOOD PRESSURE 80-89 MM HG: ICD-10-PCS | Mod: HCNC,CPTII,S$GLB, | Performed by: NURSE PRACTITIONER

## 2023-03-22 PROCEDURE — 1101F PR PT FALLS ASSESS DOC 0-1 FALLS W/OUT INJ PAST YR: ICD-10-PCS | Mod: HCNC,CPTII,S$GLB, | Performed by: NURSE PRACTITIONER

## 2023-03-22 PROCEDURE — G0439 PPPS, SUBSEQ VISIT: HCPCS | Mod: HCNC,S$GLB,, | Performed by: NURSE PRACTITIONER

## 2023-03-22 PROCEDURE — 4010F ACE/ARB THERAPY RXD/TAKEN: CPT | Mod: HCNC,CPTII,S$GLB, | Performed by: NURSE PRACTITIONER

## 2023-03-22 PROCEDURE — 3008F PR BODY MASS INDEX (BMI) DOCUMENTED: ICD-10-PCS | Mod: HCNC,CPTII,S$GLB, | Performed by: NURSE PRACTITIONER

## 2023-03-22 PROCEDURE — 3044F PR MOST RECENT HEMOGLOBIN A1C LEVEL <7.0%: ICD-10-PCS | Mod: HCNC,CPTII,S$GLB, | Performed by: NURSE PRACTITIONER

## 2023-03-22 PROCEDURE — 3079F DIAST BP 80-89 MM HG: CPT | Mod: HCNC,CPTII,S$GLB, | Performed by: NURSE PRACTITIONER

## 2023-03-22 PROCEDURE — 3075F PR MOST RECENT SYSTOLIC BLOOD PRESS GE 130-139MM HG: ICD-10-PCS | Mod: HCNC,CPTII,S$GLB, | Performed by: NURSE PRACTITIONER

## 2023-03-22 PROCEDURE — 1101F PT FALLS ASSESS-DOCD LE1/YR: CPT | Mod: HCNC,CPTII,S$GLB, | Performed by: NURSE PRACTITIONER

## 2023-03-22 PROCEDURE — 3288F PR FALLS RISK ASSESSMENT DOCUMENTED: ICD-10-PCS | Mod: HCNC,CPTII,S$GLB, | Performed by: NURSE PRACTITIONER

## 2023-03-22 PROCEDURE — 3008F BODY MASS INDEX DOCD: CPT | Mod: HCNC,CPTII,S$GLB, | Performed by: NURSE PRACTITIONER

## 2023-03-22 RX ORDER — NEBIVOLOL 10 MG/1
10 TABLET ORAL 2 TIMES DAILY
Qty: 180 TABLET | Refills: 1
Start: 2023-03-22 | End: 2023-09-20 | Stop reason: SDUPTHER

## 2023-03-22 NOTE — PROGRESS NOTES
Subjective:       Patient ID: Bartolome Esquivel Jr. is a 68 y.o. male.    Chief Complaint: Medicare AWV    HPI    THIS IS A MEDICARE ANNUAL PHYSICAL EXAM SO HEALTH RISK ASSESSMENT, DEPRESSION SCREENING, AND ADL/FUNCTIONAL ABILITY CHECKLIST REVIEWED - SEE FLOW SHEETS.  ALL PAST MEDICAL, SURGICAL AND FAMILY HISTORY REVIEWED - SEE BELOW.  ALL CHRONIC PROBLEMS EVALUATED.       Medicare AWV:  1. HRA completed:  See Health Risk Assessment flowsheet  2. PMH and Family history reviewed and updated  3.  Updated list of current providers  Team Member Role and Specialty Contact Info Address Start End Comments   PCPs         Susie Carranza NP General (Family Medicine) Phone: [phone] Fax: [phone]  [address] [place] [address] 11/4/2014 - Merged   Additional Team Members         Susie Carranza NP (Internal Medicine) Phone: [phone] Fax: [phone] [address] [place] [address] 11/4/2014 - -   Sumaya Estrada MA Care Coordinator - - 5/30/2019 - -   CEE Perdomo MD Consulting Physician (Colon and Rectal Surgery) Phone: [phone] Pager: [phone]  [address] [place] [address] 8/19/2021 - -     4.  Updated measures/vital signs completed and stable  5. Cognitive function evaluated and intact. See clock test below.  6.  Health Maintenance schedule per USPSTF reviewed and updated - schedule reviewed with patient - seen under POC below.  7.  Risk factor review: no depression, no cognitive impairments  8.  All preventative counseling reviewed and personalized health advice given.  Patient refused repeat colonoscopy due to COST  9.  Advanced care planning discussed with handouts given for patient to review.  He will take forms home to complete.  10.  Patient is on NO opioids  11.  Patient has NO substance use.          Patient is a 68 year old white male with Type 2 Diabetes, Hypertension, Hyperlipidemia, Anxiety, Insomnia, chronic mildly elevated liver enzymes,  "chronic arthralgia, and personal history of colon polyps that is here today for MEDICARE ANNUAL wellness exam with fasting lab results.     Type 2 Diabetes   controlled on Metformin 1000 mg twice daily   with hemoglobin A1c 6.2%.   Advised to get EYE exam now   Declined pneumonia vaccine     Hypertension  Prescribed Valsartan 320 mg daily and Bystolic 10 mg TWO tablets daily (cheaper than 20 mg once daily)  STOPPED Amlodipinedue to Ankle Swelling  Unable to take HCTZ due to pain with urination  /82   Pulse 62   Temp 98.1 °F (36.7 °C) (Temporal)   Ht 5' 6.54" (1.69 m)   Wt 88.3 kg (194 lb 10.7 oz)   SpO2 97%   BMI 30.92 kg/m²        Hyperlipidemia associated with Type 2 Diabetes  Prescribed Rosuvastatin 40 mg daily with Fenofibrate 160 mg daily.    Advised patient to take both medications daily.   Triglyceride 173 - LDL 79.4 - repeat in 6 months       Mildly elevated liver enzymes   Monitoring since 2018  Within range at this time.     Anxiety and Insomnia   takes Clonazepam 0.5 mg at bedtime as needed.    Patient is well aware of safety risks as well as dependency risk associated with Benzodiazepine usage - patient FAILED on  Trazodone trial to replace Clonazepam use and refuses to try any other alternative at this time. Patient is well aware of fall risk associated with medication use.     Personal history of colon polyps.   Last colonoscopy on 8/19/2021 = DUE to repeat 1 year - plans to schedule EARLY 2023   He states colonoscopy delayed due to financial situation.    History of abnormal TSH  TSH 4.220 today from 5.77 in Feb 2022  T4 WNL today  Will monitor yearly     Wellness labs  CBC good  CMP good    HgbA1C 6.2   TSH 4.220  -  T4 0.89  Cholesterol okay  - triglyceride 173    Health maintenance  Refused pneumonia vaccination  Refused repeat colonoscopy for now due to cost  Supposed to schedule diabetic eye exam    Component      Latest Ref Rng & Units 3/15/2023 11/10/2022 8/10/2022 " 2/3/2022   WBC      3.90 - 12.70 K/uL 6.96   7.12   RBC      4.60 - 6.20 M/uL 5.07   5.15   Hemoglobin      14.0 - 18.0 g/dL 15.6   16.0   Hematocrit      40.0 - 54.0 % 45.9   46.3   MCV      82 - 98 fL 91   90   MCH      27.0 - 31.0 pg 30.8   31.1 (H)   MCHC      32.0 - 36.0 g/dL 34.0   34.6   RDW      11.5 - 14.5 % 12.2   11.6   Platelets      150 - 450 K/uL 223   282   MPV      9.2 - 12.9 fL 11.9   11.5   Immature Granulocytes      0.0 - 0.5 % 0.3   0.3   Gran # (ANC)      1.8 - 7.7 K/uL 3.6   3.9   Immature Grans (Abs)      0.00 - 0.04 K/uL 0.02   0.02   Lymph #      1.0 - 4.8 K/uL 2.4   2.3   Mono #      0.3 - 1.0 K/uL 0.7   0.7   Eos #      0.0 - 0.5 K/uL 0.3   0.2   Baso #      0.00 - 0.20 K/uL 0.04   0.04   nRBC      0 /100 WBC 0   0   Gran %      38.0 - 73.0 % 51.6   54.9   Lymph %      18.0 - 48.0 % 33.8   32.0   Mono %      4.0 - 15.0 % 10.1   9.7   Eosinophil %      0.0 - 8.0 % 3.6   2.5   Basophil %      0.0 - 1.9 % 0.6   0.6   Differential Method       Automated   Automated   Neutrophils, Abs      1,500 - 7,800 cells/uL       Neutrophils Relative      %       Sodium      136 - 145 mmol/L 137 134 (L) 136 140   Potassium      3.5 - 5.1 mmol/L 4.4 4.2 5.1 4.8   Chloride      95 - 110 mmol/L 99 97 94 (L) 99   CO2      23 - 29 mmol/L 30 (H) 26 28 28   Glucose      70 - 110 mg/dL 124 (H) 131 (H) 138 (H) 121 (H)   BUN      2 - 20 mg/dL 9 11 11 10   Creatinine      0.50 - 1.40 mg/dL 0.60 0.70 0.73 0.79   Calcium      8.7 - 10.5 mg/dL 9.4 9.2 9.5 9.8   PROTEIN TOTAL      6.0 - 8.4 g/dL 7.8 7.9 7.8 7.7   Albumin      3.5 - 5.2 g/dL 5.0 4.8 4.9 5.0   BILIRUBIN TOTAL      0.1 - 1.0 mg/dL 0.8 0.6 1.0 0.7   Alkaline Phosphatase      38 - 126 U/L 55 57 50 62   AST      15 - 46 U/L 29 23 25 65 (H)   ALT      10 - 44 U/L 32 22 23 30   Anion Gap      8 - 16 mmol/L 8 11 14 13   eGFR      >60 mL/min/1.73 m:2 >60.0 >60.0 >60.0    Cholesterol      120 - 199 mg/dL 162 121 230 (H) 168   Triglycerides      30 - 150 mg/dL 173  "(H) 86 132 142   HDL      40 - 75 mg/dL 48 44 50 41   LDL Cholesterol External      63.0 - 159.0 mg/dL 79.4 59.8 (L) 153.6 98.6   HDL/Cholesterol Ratio      20.0 - 50.0 % 29.6 36.4 21.7 24.4   Total Cholesterol/HDL Ratio      2.0 - 5.0 3.4 2.8 4.6 4.1   Non-HDL Cholesterol      mg/dL 114 77 180 127   Hemoglobin A1C External      4.0 - 5.6 % 6.2 (H) 5.9 (H) 5.9 (H) 6.0 (H)   Estimated Avg Glucose      68 - 131 mg/dL 131 123 123 126   TSH      0.400 - 4.000 uIU/mL 4.220 (H)  3.190 5.770 (H)   PSA, Screen      0.00 - 4.00 ng/mL 0.34   1.7   Free T4      0.71 - 1.51 ng/dL 0.89   0.91       Review of Systems   Constitutional:  Negative for activity change, appetite change, chills, diaphoresis, fatigue and fever.   HENT:  Negative for congestion, dental problem and drooling.    Eyes:  Negative for discharge, redness and itching.   Respiratory:  Negative for cough, choking, shortness of breath and stridor.    Cardiovascular:  Negative for chest pain, palpitations and leg swelling.   Gastrointestinal:  Negative for abdominal distention, abdominal pain, diarrhea and nausea.   Endocrine: Negative for polydipsia, polyphagia and polyuria.   Genitourinary:  Negative for difficulty urinating and flank pain.   Skin:  Negative for pallor, rash and wound.   Allergic/Immunologic: Negative for environmental allergies.   Neurological:  Negative for dizziness and headaches.   Hematological:  Negative for adenopathy.   Psychiatric/Behavioral:  Negative for agitation, behavioral problems and confusion.        Objective:     Vitals:    03/22/23 0831 03/22/23 0906   BP: (!) 136/92 136/82   BP Location: Left arm    Patient Position: Sitting    BP Method: Large (Manual)    Pulse: 62    Temp: 98.1 °F (36.7 °C)    TempSrc: Temporal    SpO2: 97%    Weight: 88.3 kg (194 lb 10.7 oz)    Height: 5' 6.54" (1.69 m)           Physical Exam  Constitutional:       Appearance: Normal appearance.      Comments: Body mass index is 30.92 kg/m².     HENT:     "  Right Ear: Tympanic membrane, ear canal and external ear normal. There is no impacted cerumen.      Left Ear: Tympanic membrane, ear canal and external ear normal. There is no impacted cerumen.      Nose: No congestion.      Mouth/Throat:      Mouth: Mucous membranes are moist.      Pharynx: No posterior oropharyngeal erythema.   Eyes:      General:         Right eye: No discharge.         Left eye: No discharge.   Cardiovascular:      Rate and Rhythm: Normal rate and regular rhythm.      Pulses: Normal pulses.      Heart sounds: Normal heart sounds.   Pulmonary:      Effort: No respiratory distress.      Breath sounds: Normal breath sounds. No stridor.   Abdominal:      General: Bowel sounds are normal. There is no distension.      Palpations: Abdomen is soft.   Musculoskeletal:         General: No swelling or tenderness.      Cervical back: Normal range of motion. No tenderness.   Skin:     General: Skin is warm and dry.      Capillary Refill: Capillary refill takes less than 2 seconds.   Neurological:      General: No focal deficit present.      Mental Status: He is alert and oriented to person, place, and time.   Psychiatric:         Mood and Affect: Mood normal.         Behavior: Behavior normal.         Assessment:         ICD-10-CM ICD-9-CM   1. Medicare annual wellness visit, subsequent  Z00.00 V70.0   2. Type 2 diabetes mellitus without complication, without long-term current use of insulin  E11.9 250.00   3. Hypertension associated with type 2 diabetes mellitus  E11.59 250.80    I15.2 401.9   4. Hyperlipidemia associated with type 2 diabetes mellitus  E11.69 250.80    E78.5 272.4   5. Class 1 obesity due to excess calories with serious comorbidity and body mass index (BMI) of 30.0 to 30.9 in adult  E66.09 278.00    Z68.30 V85.30   6. Abnormal TSH  R79.89 790.6   7. Multiple polyps of sigmoid colon  K63.5 211.3   8. Anxiety  F41.9 300.00   9. Insomnia, unspecified type  G47.00 780.52       Plan:        Medicare annual wellness visit, subsequent  Health Maintenance Summary     Full History      Expand All  Collapse All    Overdue - Colorectal Cancer Screening  (Colonoscopy - Yearly)  Overdue since 8/19/2022 08/19/2021  Colonoscopy    08/19/2021  Surgical Procedure: COLONOSCOPY    07/21/2021  Cologuard Screening (Multitarget Stool DNA)    05/13/2020  Fecal Immunochemical Test (iFOBT)    03/16/2019  Fecal Immunochemical Test (iFOBT)    View More History     Postponed - Influenza Vaccine  (1)  Postponed until 6/30/2023 02/26/2018  Declined    01/13/2017  Declined    11/06/2015  Declined      Postponed - Eye Exam  (Yearly)  Postponed until 7/10/2023  07/12/2021  [facility] DIABETES EYE EXAM    03/18/2019  Diabetic Eye Screening Photo    12/20/2016  Done - Dr. Humphries - no diabetic retinopathy      Postponed - Shingles Vaccine  (1 of 2)  Postponed until 8/16/2023  No completion history exists for this topic.     Postponed - COVID-19 Vaccine  (4 - Booster for Moderna series)  Postponed until 11/15/2023                      Postponed - Pneumococcal Vaccines (Age 65+)  (1 - PCV)  Postponed until 3/22/2024  No completion history exists for this topic.     Foot Exam  (Yearly)  Next due on 8/16/2023 08/16/2022  Done    07/14/2021  Done    07/14/2021  [employer]: WORKFLOW - DIABETES - DIABETIC FOOT EXAM PERFORMED    08/05/2020  [employer]: WORKFLOW - DIABETES - DIABETIC FOOT EXAM PERFORMED    05/27/2020  [employer]: WORKFLOW - DIABETES - DIABETIC FOOT EXAM PERFORMED    View More History     Scheduled - Hemoglobin A1c  (Every 6 Months)  Scheduled for 9/13/2023  03/15/2023  Hemoglobin A1C External component of Hemoglobin A1C    11/10/2022  Hemoglobin A1C External component of Hemoglobin A1C    08/10/2022  Hemoglobin A1C External component of Hemoglobin A1C    02/03/2022  Hemoglobin A1C External component of Hemoglobin A1C    07/09/2021  Hemoglobin A1C External component of Hemoglobin A1C    View More History     Diabetes Urine  Screening  (Yearly)  Next due on 11/10/2023  11/10/2022  MICROALB/CREAT RATIO component of Microalbumin/Creatinine Ratio, Urine    07/09/2021  MICROALB/CREAT RATIO component of Microalbumin/Creatinine Ratio, Urine    11/04/2015  MICROALB/CREAT RATIO component of Microalbumin/creatinine urine ratio      PROSTATE-SPECIFIC ANTIGEN  (Yearly)  Next due on 3/15/2024  03/15/2023  PSA, Screening    02/03/2022  PSA, Screening    01/05/2021  PSA, Screening    09/11/2019  PSA, Screening    02/21/2018  PSA, SCREENING    View More History     Scheduled - Lipid Panel  (Yearly)  Scheduled for 9/13/2023  03/15/2023  Lipid Panel    11/10/2022  Lipid Panel    08/10/2022  Lipid Panel    02/03/2022  Lipid Panel    07/09/2021  Lipid Panel    View More History     Low Dose Statin  (Yearly)  Next due on 3/22/2024  03/22/2023  Registry Metric: Last Current Statin Reviewed Date    02/13/2023  Registry Metric: Last Current Statin Order Date      TETANUS VACCINE  (Every 10 Years)  Next due on 6/23/2026 06/23/2016  Declined      Hepatitis C Screening  Completed  06/22/2016  Hepatitis C Ab component of Hepatitis C antibody      Abdominal Aortic Aneurysm Screening  Completed  03/23/2022  US Abdominal Aorta         Type 2 diabetes mellitus without complication, without long-term current use of insulin  Continue current medication(s).  Follow up in 6 months.  -     Comprehensive Metabolic Panel; Future; Expected date: 03/22/2023  -     Hemoglobin A1C; Future; Expected date: 03/22/2023    Hypertension associated with type 2 diabetes mellitus  Continue current medication(s).  Follow up in 6 months.  -     nebivoloL (BYSTOLIC) 10 MG Tab; Take 1 tablet (10 mg total) by mouth 2 (two) times a day.  Dispense: 180 tablet; Refill: 1    Hyperlipidemia associated with type 2 diabetes mellitus  Continue current medication(s).  Follow up in 6 months.  -     Lipid Panel; Future; Expected date: 03/22/2023    Class 1 obesity due to excess calories with serious  comorbidity and body mass index (BMI) of 30.0 to 30.9 in adult  Work on diet    Abnormal TSH  Monitored yearly    Multiple polyps of sigmoid colon  Declined colonoscopy due to cost - aware of colon cancer risks.    Anxiety  Continue current medication(s).  Follow up in 6 months.    Insomnia, unspecified type  Continue current medication(s).  Follow up in 6 months.      Follow up in about 6 months (around 9/22/2023) for fasting labs and follow up.     Patient's Medications   New Prescriptions    No medications on file   Previous Medications    ASPIRIN (ECOTRIN) 81 MG EC TABLET    Take 1 tablet (81 mg total) by mouth once daily.    CICLOPIROX (PENLAC) 8 % SOLN    Apply topically nightly. Clean nail with alcohol every 7 days.    CLONAZEPAM (KLONOPIN) 0.5 MG TABLET    TAKE ONE TABLET BY MOUTH AT BEDTIME AS NEEDED FOR ANXIETY    CYANOCOBALAMIN (VITAMIN B-12) 100 MCG TABLET    Take 100 mcg by mouth once daily.    FENOFIBRATE 160 MG TAB    TAKE ONE TABLET BY MOUTH EVERY EVENING    GARLIC 400 MG TAB    Take by mouth.    METFORMIN (GLUCOPHAGE) 1000 MG TABLET    TAKE ONE TABLET BY MOUTH TWICE A DAY WITH MEALS    MOMETASONE (NASONEX) 50 MCG/ACTUATION NASAL SPRAY    2 sprays by Nasal route once daily.    PSYLLIUM 0.52 GRAM CAPSULE    Take 3 capsules by mouth 2 (two) times daily.    ROSUVASTATIN (CRESTOR) 40 MG TAB    TAKE ONE TABLET BY MOUTH EVERY DAY    VALSARTAN (DIOVAN) 320 MG TABLET    Take 1 tablet (320 mg total) by mouth once daily.    VITAMIN D 1000 UNITS TAB    Take 4,000 Units by mouth once daily.   Modified Medications    Modified Medication Previous Medication    NEBIVOLOL (BYSTOLIC) 10 MG TAB nebivoloL (BYSTOLIC) 10 MG Tab       Take 1 tablet (10 mg total) by mouth 2 (two) times a day.    TAKE ONE TABLET BY MOUTH EVERY DAY   Discontinued Medications    No medications on file       Past Medical History:   Diagnosis Date    Anxiety     DM (diabetes mellitus) 11/4/2014    Hyperlipidemia     Hypertension     Impaired  fasting glucose     Metabolic syndrome        Past Surgical History:   Procedure Laterality Date    COLONOSCOPY      + polyp - tubular adenoma - Dr. Hernandez    COLONOSCOPY N/A 2021    Procedure: COLONOSCOPY;  Surgeon: CEE Perdomo MD;  Location: [facility];  Service: Endoscopy;  Laterality: N/A;       Family History   Problem Relation Age of Onset    Cancer Mother         Lung Cancer and Colon Cancer    Heart disease Father 49        MI    Diabetes Father     No Known Problems Daughter     No Known Problems Son     No Known Problems Son        Social History     Socioeconomic History    Marital status:    Occupational History    Occupation: retired     Employer: [employer]   Tobacco Use    Smoking status: Former     Packs/day: 1.00     Years: 15.00     Pack years: 15.00     Types: Cigarettes     Quit date: 1994     Years since quittin.2     Passive exposure: Past    Smokeless tobacco: Former     Types: Chew    Tobacco comments:     Quit in    Substance and Sexual Activity    Alcohol use: Yes     Comment: Reports 1 to 2 beers per day on weekdays and 10 beers per day on weekend days    Drug use: No    Sexual activity: Yes     Partners: Female

## 2023-05-04 DIAGNOSIS — G47.00 INSOMNIA, UNSPECIFIED TYPE: ICD-10-CM

## 2023-05-04 RX ORDER — CLONAZEPAM 0.5 MG/1
TABLET ORAL
Qty: 30 TABLET | Refills: 0 | Status: SHIPPED | OUTPATIENT
Start: 2023-05-04 | End: 2023-06-09 | Stop reason: SDUPTHER

## 2023-05-12 ENCOUNTER — TELEPHONE (OUTPATIENT)
Dept: FAMILY MEDICINE | Facility: CLINIC | Age: 68
End: 2023-05-12
Payer: MEDICARE

## 2023-05-12 DIAGNOSIS — I15.2 HYPERTENSION ASSOCIATED WITH TYPE 2 DIABETES MELLITUS: ICD-10-CM

## 2023-05-12 DIAGNOSIS — E11.69 HYPERLIPIDEMIA ASSOCIATED WITH TYPE 2 DIABETES MELLITUS: ICD-10-CM

## 2023-05-12 DIAGNOSIS — E78.5 HYPERLIPIDEMIA ASSOCIATED WITH TYPE 2 DIABETES MELLITUS: ICD-10-CM

## 2023-05-12 DIAGNOSIS — E11.59 HYPERTENSION ASSOCIATED WITH TYPE 2 DIABETES MELLITUS: ICD-10-CM

## 2023-05-12 RX ORDER — FENOFIBRATE 160 MG/1
160 TABLET ORAL NIGHTLY
Qty: 90 TABLET | Refills: 1 | Status: SHIPPED | OUTPATIENT
Start: 2023-05-12 | End: 2023-09-20 | Stop reason: SDUPTHER

## 2023-05-12 RX ORDER — VALSARTAN 320 MG/1
TABLET ORAL
Qty: 90 TABLET | Refills: 1 | Status: SHIPPED | OUTPATIENT
Start: 2023-05-12 | End: 2023-09-20 | Stop reason: SDUPTHER

## 2023-05-12 NOTE — TELEPHONE ENCOUNTER
----- Message from Sarah Magallanes sent at 5/12/2023  4:01 PM CDT -----  Regarding: refill  Contact: [phone]  Type:  RX Refill Request    Who Called: self   Refill or New Rx: refill   RX Name and Strength: fenofibrate 160 MG Tab  How is the patient currently taking it? (ex. 1XDay): TAKE ONE TABLET BY MOUTH EVERY EVENING  Is this a 30 day or 90 day RX: 90 tablets / 1 refill   Preferred Pharmacy with phone number: [id number] - [place] - [address] [facility]  Local or Mail Order: local   Ordering Provider:   Would the patient rather a call back or a response via [facility]?  Call   Best Call Back Number: [phone]  Additional Information:

## 2023-05-12 NOTE — TELEPHONE ENCOUNTER
Spoke with patient- advised him we receive refill from the pharmacy and refill will be sent to the pharmacy later today

## 2023-06-09 DIAGNOSIS — G47.00 INSOMNIA, UNSPECIFIED TYPE: ICD-10-CM

## 2023-06-09 RX ORDER — CLONAZEPAM 0.5 MG/1
0.5 TABLET ORAL NIGHTLY
Qty: 30 TABLET | Refills: 0 | Status: SHIPPED | OUTPATIENT
Start: 2023-06-09 | End: 2023-07-17 | Stop reason: SDUPTHER

## 2023-06-09 NOTE — TELEPHONE ENCOUNTER
----- Message from Memo Grimes sent at 6/9/2023 12:22 PM CDT -----  Contact: Pt  .Type:  RX Refill Request    Who Called: Pt  Refill or New Rx:refill  RX Name and Strength:clonazePAM (KLONOPIN) 0.5 MG tablet  Preferred Pharmacy with phone number:[id number] - [place], [place] - [address] [facility] [address]  Local or Mail Order:local  Ordering Provider:Dillon  Would the patient rather a call back or a response via [facility]? call  Best Call Back Number:[phone]  Additional Information:

## 2023-07-17 DIAGNOSIS — G47.00 INSOMNIA, UNSPECIFIED TYPE: ICD-10-CM

## 2023-07-17 RX ORDER — CLONAZEPAM 0.5 MG/1
0.5 TABLET ORAL NIGHTLY
Qty: 30 TABLET | Refills: 0 | Status: SHIPPED | OUTPATIENT
Start: 2023-07-17 | End: 2023-09-20 | Stop reason: SDUPTHER

## 2023-07-17 NOTE — TELEPHONE ENCOUNTER
----- Message from Virgie Boyd sent at 7/17/2023  2:03 PM CDT -----  Type:  RX Refill Request    Who Called: pt   Refill or New Rx:refill  RX Name and Strength:clonazePAM (KLONOPIN) 0.5 MG tablet  How is the patient currently taking it? (ex. 1XDay):[id number] - [place] - [address]   Phone:  [phone]  Fax:  [phone]        Is this a 30 day or 90 day RX:30  Preferred Pharmacy with phone number:[id number] - [place] - [address]   Phone:  [phone]  Fax:  [phone]        Local or Mail Order:local  Ordering Provider:Dr Carranza  Would the patient rather a call back or a response via [facility]? call  Best Call Back Number:[phone]   Additional Information:

## 2023-09-20 ENCOUNTER — OFFICE VISIT (OUTPATIENT)
Dept: FAMILY MEDICINE | Facility: CLINIC | Age: 68
End: 2023-09-20
Payer: MEDICARE

## 2023-09-20 VITALS
OXYGEN SATURATION: 97 % | TEMPERATURE: 98 F | DIASTOLIC BLOOD PRESSURE: 80 MMHG | WEIGHT: 184.94 LBS | BODY MASS INDEX: 29.03 KG/M2 | HEART RATE: 66 BPM | SYSTOLIC BLOOD PRESSURE: 124 MMHG | HEIGHT: 67 IN

## 2023-09-20 DIAGNOSIS — E11.9 TYPE 2 DIABETES MELLITUS WITHOUT COMPLICATION, WITHOUT LONG-TERM CURRENT USE OF INSULIN: Primary | ICD-10-CM

## 2023-09-20 DIAGNOSIS — E11.59 HYPERTENSION ASSOCIATED WITH TYPE 2 DIABETES MELLITUS: ICD-10-CM

## 2023-09-20 DIAGNOSIS — E66.3 OVERWEIGHT WITH BODY MASS INDEX (BMI) OF 29 TO 29.9 IN ADULT: ICD-10-CM

## 2023-09-20 DIAGNOSIS — E11.69 HYPERLIPIDEMIA ASSOCIATED WITH TYPE 2 DIABETES MELLITUS: ICD-10-CM

## 2023-09-20 DIAGNOSIS — E78.5 HYPERLIPIDEMIA ASSOCIATED WITH TYPE 2 DIABETES MELLITUS: ICD-10-CM

## 2023-09-20 DIAGNOSIS — R79.89 ABNORMAL TSH: ICD-10-CM

## 2023-09-20 DIAGNOSIS — K63.5 MULTIPLE POLYPS OF SIGMOID COLON: ICD-10-CM

## 2023-09-20 DIAGNOSIS — F41.9 ANXIETY: ICD-10-CM

## 2023-09-20 DIAGNOSIS — Z12.5 SCREENING PSA (PROSTATE SPECIFIC ANTIGEN): ICD-10-CM

## 2023-09-20 DIAGNOSIS — I15.2 HYPERTENSION ASSOCIATED WITH TYPE 2 DIABETES MELLITUS: ICD-10-CM

## 2023-09-20 DIAGNOSIS — G47.00 INSOMNIA, UNSPECIFIED TYPE: ICD-10-CM

## 2023-09-20 PROCEDURE — 99214 OFFICE O/P EST MOD 30 MIN: CPT | Mod: HCNC,S$GLB,, | Performed by: NURSE PRACTITIONER

## 2023-09-20 PROCEDURE — 3008F PR BODY MASS INDEX (BMI) DOCUMENTED: ICD-10-PCS | Mod: HCNC,CPTII,S$GLB, | Performed by: NURSE PRACTITIONER

## 2023-09-20 PROCEDURE — 1101F PR PT FALLS ASSESS DOC 0-1 FALLS W/OUT INJ PAST YR: ICD-10-PCS | Mod: HCNC,CPTII,S$GLB, | Performed by: NURSE PRACTITIONER

## 2023-09-20 PROCEDURE — 3008F BODY MASS INDEX DOCD: CPT | Mod: HCNC,CPTII,S$GLB, | Performed by: NURSE PRACTITIONER

## 2023-09-20 PROCEDURE — 1101F PT FALLS ASSESS-DOCD LE1/YR: CPT | Mod: HCNC,CPTII,S$GLB, | Performed by: NURSE PRACTITIONER

## 2023-09-20 PROCEDURE — 3079F PR MOST RECENT DIASTOLIC BLOOD PRESSURE 80-89 MM HG: ICD-10-PCS | Mod: HCNC,CPTII,S$GLB, | Performed by: NURSE PRACTITIONER

## 2023-09-20 PROCEDURE — 99214 PR OFFICE/OUTPT VISIT, EST, LEVL IV, 30-39 MIN: ICD-10-PCS | Mod: HCNC,S$GLB,, | Performed by: NURSE PRACTITIONER

## 2023-09-20 PROCEDURE — 1126F AMNT PAIN NOTED NONE PRSNT: CPT | Mod: HCNC,CPTII,S$GLB, | Performed by: NURSE PRACTITIONER

## 2023-09-20 PROCEDURE — 1126F PR PAIN SEVERITY QUANTIFIED, NO PAIN PRESENT: ICD-10-PCS | Mod: HCNC,CPTII,S$GLB, | Performed by: NURSE PRACTITIONER

## 2023-09-20 PROCEDURE — 3288F PR FALLS RISK ASSESSMENT DOCUMENTED: ICD-10-PCS | Mod: HCNC,CPTII,S$GLB, | Performed by: NURSE PRACTITIONER

## 2023-09-20 PROCEDURE — 1160F PR REVIEW ALL MEDS BY PRESCRIBER/CLIN PHARMACIST DOCUMENTED: ICD-10-PCS | Mod: HCNC,CPTII,S$GLB, | Performed by: NURSE PRACTITIONER

## 2023-09-20 PROCEDURE — 1160F RVW MEDS BY RX/DR IN RCRD: CPT | Mod: HCNC,CPTII,S$GLB, | Performed by: NURSE PRACTITIONER

## 2023-09-20 PROCEDURE — 99999 PR PBB SHADOW E&M-EST. PATIENT-LVL III: CPT | Mod: PBBFAC,HCNC,, | Performed by: NURSE PRACTITIONER

## 2023-09-20 PROCEDURE — 1159F PR MEDICATION LIST DOCUMENTED IN MEDICAL RECORD: ICD-10-PCS | Mod: HCNC,CPTII,S$GLB, | Performed by: NURSE PRACTITIONER

## 2023-09-20 PROCEDURE — 3074F PR MOST RECENT SYSTOLIC BLOOD PRESSURE < 130 MM HG: ICD-10-PCS | Mod: HCNC,CPTII,S$GLB, | Performed by: NURSE PRACTITIONER

## 2023-09-20 PROCEDURE — 3288F FALL RISK ASSESSMENT DOCD: CPT | Mod: HCNC,CPTII,S$GLB, | Performed by: NURSE PRACTITIONER

## 2023-09-20 PROCEDURE — 4010F PR ACE/ARB THEARPY RXD/TAKEN: ICD-10-PCS | Mod: HCNC,CPTII,S$GLB, | Performed by: NURSE PRACTITIONER

## 2023-09-20 PROCEDURE — 4010F ACE/ARB THERAPY RXD/TAKEN: CPT | Mod: HCNC,CPTII,S$GLB, | Performed by: NURSE PRACTITIONER

## 2023-09-20 PROCEDURE — 99999 PR PBB SHADOW E&M-EST. PATIENT-LVL III: ICD-10-PCS | Mod: PBBFAC,HCNC,, | Performed by: NURSE PRACTITIONER

## 2023-09-20 PROCEDURE — 3079F DIAST BP 80-89 MM HG: CPT | Mod: HCNC,CPTII,S$GLB, | Performed by: NURSE PRACTITIONER

## 2023-09-20 PROCEDURE — 1159F MED LIST DOCD IN RCRD: CPT | Mod: HCNC,CPTII,S$GLB, | Performed by: NURSE PRACTITIONER

## 2023-09-20 PROCEDURE — 3044F PR MOST RECENT HEMOGLOBIN A1C LEVEL <7.0%: ICD-10-PCS | Mod: HCNC,CPTII,S$GLB, | Performed by: NURSE PRACTITIONER

## 2023-09-20 PROCEDURE — 3044F HG A1C LEVEL LT 7.0%: CPT | Mod: HCNC,CPTII,S$GLB, | Performed by: NURSE PRACTITIONER

## 2023-09-20 PROCEDURE — 3074F SYST BP LT 130 MM HG: CPT | Mod: HCNC,CPTII,S$GLB, | Performed by: NURSE PRACTITIONER

## 2023-09-20 RX ORDER — FENOFIBRATE 160 MG/1
160 TABLET ORAL NIGHTLY
Qty: 90 TABLET | Refills: 1 | Status: SHIPPED | OUTPATIENT
Start: 2023-09-20 | End: 2024-01-08 | Stop reason: SDUPTHER

## 2023-09-20 RX ORDER — CLONAZEPAM 0.5 MG/1
0.5 TABLET ORAL NIGHTLY
Qty: 30 TABLET | Refills: 5 | Status: SHIPPED | OUTPATIENT
Start: 2023-09-20 | End: 2024-04-01

## 2023-09-20 RX ORDER — NEBIVOLOL 10 MG/1
10 TABLET ORAL 2 TIMES DAILY
Qty: 180 TABLET | Refills: 1
Start: 2023-09-20 | End: 2023-09-22 | Stop reason: SDUPTHER

## 2023-09-20 RX ORDER — VALSARTAN 320 MG/1
320 TABLET ORAL DAILY
Qty: 90 TABLET | Refills: 1 | Status: SHIPPED | OUTPATIENT
Start: 2023-09-20 | End: 2024-01-08 | Stop reason: SDUPTHER

## 2023-09-20 NOTE — PROGRESS NOTES
"Subjective:       Patient ID: Bartolome Esquivel Jr. is a 68 y.o. male.    Chief Complaint: Follow-up (6 months F/U)    HPI    Patient is a 68 year old white male with Type 2 Diabetes, Hypertension, Hyperlipidemia, Anxiety, Insomnia, chronic mildly elevated liver enzymes, chronic arthralgia, and personal history of colon polyps that is here today for 6 month follow up with fasting lab results.     Type 2 Diabetes   controlled on Metformin 1000 mg twice daily   with hemoglobin A1c 6.1%.   Advised to get EYE exam now   Declined pneumonia vaccine        Hypertension  Prescribed Valsartan 320 mg daily and Bystolic 10 mg TWO tablets daily (cheaper than 20 mg once daily)  STOPPED Amlodipinedue to Ankle Swelling  Unable to take HCTZ due to pain with urination  /80 (BP Location: Left arm, Patient Position: Sitting, BP Method: Large (Manual))   Pulse 66   Temp 98.2 °F (36.8 °C) (Temporal)   Ht 5' 6.54" (1.69 m)   Wt 83.9 kg (184 lb 15.5 oz)   SpO2 97%   BMI 29.37 kg/m²           Hyperlipidemia associated with Type 2 Diabetes  Prescribed Rosuvastatin 40 mg daily with Fenofibrate 160 mg daily.    Advised patient to take both medications daily.   LDL 74.2      Mildly elevated liver enzymes   Monitoring since 2018  Within range at this time.        Anxiety and Insomnia   takes Clonazepam 0.5 mg at bedtime as needed.    Patient is well aware of safety risks as well as dependency risk associated with Benzodiazepine usage - patient FAILED on  Trazodone trial to replace Clonazepam use and refuses to try any other alternative at this time. Patient is well aware of fall risk associated with medication use.     Personal history of colon polyps.   Last colonoscopy on 8/19/2021 = DUE to repeat 1 year - plans to schedule EARLY 2024  He states colonoscopy delayed due to financial situation.     History of abnormal TSH  Will monitor yearly         Review of Systems   HENT: Negative.     Respiratory: Negative.   " "  Cardiovascular: Negative.    Gastrointestinal: Negative.          Objective:     Vitals:    09/20/23 0817   BP: 124/80   BP Location: Left arm   Patient Position: Sitting   BP Method: Large (Manual)   Pulse: 66   Temp: 98.2 °F (36.8 °C)   TempSrc: Temporal   SpO2: 97%   Weight: 83.9 kg (184 lb 15.5 oz)   Height: 5' 6.54" (1.69 m)          Physical Exam  Constitutional:       Appearance: Normal appearance.      Comments: Body mass index is 29.37 kg/m².       HENT:      Right Ear: Tympanic membrane, ear canal and external ear normal. There is no impacted cerumen.      Left Ear: Tympanic membrane, ear canal and external ear normal. There is no impacted cerumen.      Nose: No congestion.      Mouth/Throat:      Mouth: Mucous membranes are moist.      Pharynx: No posterior oropharyngeal erythema.   Eyes:      General:         Right eye: No discharge.         Left eye: No discharge.   Cardiovascular:      Rate and Rhythm: Normal rate and regular rhythm.      Pulses: Normal pulses.           Dorsalis pedis pulses are 2+ on the right side and 2+ on the left side.      Heart sounds: Normal heart sounds.   Pulmonary:      Effort: No respiratory distress.      Breath sounds: Normal breath sounds. No stridor.   Abdominal:      General: Bowel sounds are normal. There is no distension.      Palpations: Abdomen is soft. There is no mass.      Tenderness: There is no abdominal tenderness. There is no guarding.      Hernia: No hernia is present.   Musculoskeletal:         General: No swelling or tenderness.      Cervical back: Normal range of motion. No tenderness.   Feet:      Right foot:      Protective Sensation: 7 sites tested.  7 sites sensed.      Skin integrity: No ulcer or skin breakdown.      Left foot:      Protective Sensation: 7 sites tested.  7 sites sensed.      Skin integrity: No ulcer or skin breakdown.   Skin:     General: Skin is warm and dry.      Capillary Refill: Capillary refill takes less than 2 seconds. "   Neurological:      General: No focal deficit present.      Mental Status: He is alert and oriented to person, place, and time.   Psychiatric:         Mood and Affect: Mood normal.         Behavior: Behavior normal.           Assessment:         ICD-10-CM ICD-9-CM   1. Type 2 diabetes mellitus without complication, without long-term current use of insulin  E11.9 250.00   2. Hypertension associated with type 2 diabetes mellitus  E11.59 250.80    I15.2 401.9   3. Hyperlipidemia associated with type 2 diabetes mellitus  E11.69 250.80    E78.5 272.4   4. Insomnia, unspecified type  G47.00 780.52   5. Anxiety  F41.9 300.00   6. Overweight with body mass index (BMI) of 29 to 29.9 in adult  E66.3 278.02    Z68.29 V85.25   7. Multiple polyps of sigmoid colon  K63.5 211.3   8. Abnormal TSH  R79.89 790.6   9. Screening PSA (prostate specific antigen)  Z12.5 V76.44       Plan:       Type 2 diabetes mellitus without complication, without long-term current use of insulin  Controlled on present med  Recheck in 6 months.  -     Comprehensive Metabolic Panel; Future; Expected date: 03/15/2024  -     Hemoglobin A1C; Future; Expected date: 03/15/2024    Hypertension associated with type 2 diabetes mellitus  Controlled on present med  Recheck in 6 months.  -     valsartan (DIOVAN) 320 MG tablet; Take 1 tablet (320 mg total) by mouth once daily.  Dispense: 90 tablet; Refill: 1  -     nebivoloL (BYSTOLIC) 10 MG Tab; Take 1 tablet (10 mg total) by mouth 2 (two) times a day.  Dispense: 180 tablet; Refill: 1  -     TSH; Future; Expected date: 03/15/2024    Hyperlipidemia associated with type 2 diabetes mellitus  Controlled on present med  Recheck in 6 months.  -     fenofibrate 160 MG Tab; Take 1 tablet (160 mg total) by mouth every evening.  Dispense: 90 tablet; Refill: 1  -     Lipid Panel; Future; Expected date: 03/15/2024    Insomnia, unspecified type  Controlled on present med  Recheck in 6 months.  -     clonazePAM (KLONOPIN) 0.5 MG  tablet; Take 1 tablet (0.5 mg total) by mouth every evening.  Dispense: 30 tablet; Refill: 5    Anxiety  Controlled on present med  Recheck in 6 months.    Overweight with body mass index (BMI) of 29 to 29.9 in adult  Work on diet  -     CBC Auto Differential; Future; Expected date: 03/15/2024    Multiple polyps of sigmoid colon  RESCHEDULE COLONOSCOPY    Abnormal TSH  Monitoring - recheck in 6 months.  -     TSH; Future; Expected date: 03/15/2024    Screening PSA (prostate specific antigen)  -     PSA, Screening; Future; Expected date: 03/15/2024      Follow up in about 6 months (around 3/20/2024) for fasting labs and MEDICARE WELLNESS EXAM.     Patient's Medications   New Prescriptions    No medications on file   Previous Medications    ASPIRIN (ECOTRIN) 81 MG EC TABLET    Take 1 tablet (81 mg total) by mouth once daily.    CICLOPIROX (PENLAC) 8 % SOLN    Apply topically nightly. Clean nail with alcohol every 7 days.    CYANOCOBALAMIN (VITAMIN B-12) 100 MCG TABLET    Take 100 mcg by mouth once daily.    GARLIC 400 MG TAB    Take by mouth.    METFORMIN (GLUCOPHAGE) 1000 MG TABLET    TAKE ONE TABLET BY MOUTH TWICE A DAY WITH MEALS    MOMETASONE (NASONEX) 50 MCG/ACTUATION NASAL SPRAY    2 sprays by Nasal route once daily.    PSYLLIUM 0.52 GRAM CAPSULE    Take 3 capsules by mouth 2 (two) times daily.    ROSUVASTATIN (CRESTOR) 40 MG TAB    TAKE ONE TABLET BY MOUTH EVERY DAY    VITAMIN D 1000 UNITS TAB    Take 4,000 Units by mouth once daily.   Modified Medications    Modified Medication Previous Medication    CLONAZEPAM (KLONOPIN) 0.5 MG TABLET clonazePAM (KLONOPIN) 0.5 MG tablet       Take 1 tablet (0.5 mg total) by mouth every evening.    Take 1 tablet (0.5 mg total) by mouth every evening.    FENOFIBRATE 160 MG TAB fenofibrate 160 MG Tab       Take 1 tablet (160 mg total) by mouth every evening.    Take 1 tablet (160 mg total) by mouth every evening.    NEBIVOLOL (BYSTOLIC) 10 MG TAB nebivoloL (BYSTOLIC) 10 MG Tab        Take 1 tablet (10 mg total) by mouth 2 (two) times a day.    Take 1 tablet (10 mg total) by mouth 2 (two) times a day.    VALSARTAN (DIOVAN) 320 MG TABLET valsartan (DIOVAN) 320 MG tablet       Take 1 tablet (320 mg total) by mouth once daily.    TAKE ONE TABLET BY MOUTH EVERY DAY   Discontinued Medications    No medications on file       Past Medical History:   Diagnosis Date    Anxiety     DM (diabetes mellitus) 2014    Hyperlipidemia     Hypertension     Impaired fasting glucose     Metabolic syndrome        Past Surgical History:   Procedure Laterality Date    COLONOSCOPY      + polyp - tubular adenoma - Dr. Hernandez    COLONOSCOPY N/A 2021    Procedure: COLONOSCOPY;  Surgeon: CEE Perdomo MD;  Location: [facility];  Service: Endoscopy;  Laterality: N/A;       Family History   Problem Relation Age of Onset    Cancer Mother         Lung Cancer and Colon Cancer    Heart disease Father 49        MI    Diabetes Father     No Known Problems Daughter     No Known Problems Son     No Known Problems Son        Social History     Socioeconomic History    Marital status:    Occupational History    Occupation: retired     Employer: [employer]   Tobacco Use    Smoking status: Former     Current packs/day: 0.00     Average packs/day: 1 pack/day for 15.0 years (15.0 ttl pk-yrs)     Types: Cigarettes     Start date: 1979     Quit date: 1994     Years since quittin.7     Passive exposure: Past    Smokeless tobacco: Former     Types: Chew    Tobacco comments:     Quit in    Substance and Sexual Activity    Alcohol use: Yes     Comment: Reports 1 to 2 beers per day on weekdays and 10 beers per day on weekend days    Drug use: No    Sexual activity: Yes     Partners: Female

## 2023-09-22 DIAGNOSIS — E11.59 HYPERTENSION ASSOCIATED WITH TYPE 2 DIABETES MELLITUS: ICD-10-CM

## 2023-09-22 DIAGNOSIS — I15.2 HYPERTENSION ASSOCIATED WITH TYPE 2 DIABETES MELLITUS: ICD-10-CM

## 2023-09-22 RX ORDER — NEBIVOLOL 10 MG/1
10 TABLET ORAL 2 TIMES DAILY
Qty: 180 TABLET | Refills: 1
Start: 2023-09-22 | End: 2023-09-25 | Stop reason: SDUPTHER

## 2023-09-22 NOTE — TELEPHONE ENCOUNTER
----- Message from Sona Mejia sent at 9/22/2023 11:48 AM CDT -----  Type:  Needs Medical Advice    Who Called: pt  Pharmacy name and phone #:  [id number] - [place], [place] - [address] [facility], [address]  Would the patient rather a call back or a response via [facility]? call  Best Call Back Number: [phone]  Additional Information: stated all his medications were sent over except one and he is completely out of medication for BP nebivoloL (BYSTOLIC) 10 MG Tab  Pt has other 3 just need BP medication resent

## 2023-09-22 NOTE — TELEPHONE ENCOUNTER
Called and spoke with patient in regards of his message. Pt states that all his medication were sent to the pharmacy expect for one. Pt has no more of this medication at home. Please advise message and fill med for patient.

## 2023-09-23 DIAGNOSIS — E11.59 HYPERTENSION ASSOCIATED WITH TYPE 2 DIABETES MELLITUS: ICD-10-CM

## 2023-09-23 DIAGNOSIS — I15.2 HYPERTENSION ASSOCIATED WITH TYPE 2 DIABETES MELLITUS: ICD-10-CM

## 2023-09-23 RX ORDER — NEBIVOLOL 10 MG/1
10 TABLET ORAL 2 TIMES DAILY
Qty: 180 TABLET | Refills: 1 | Status: CANCELLED
Start: 2023-09-23

## 2023-09-25 ENCOUNTER — TELEPHONE (OUTPATIENT)
Dept: FAMILY MEDICINE | Facility: CLINIC | Age: 68
End: 2023-09-25
Payer: MEDICARE

## 2023-09-25 DIAGNOSIS — E11.59 HYPERTENSION ASSOCIATED WITH TYPE 2 DIABETES MELLITUS: ICD-10-CM

## 2023-09-25 DIAGNOSIS — I15.2 HYPERTENSION ASSOCIATED WITH TYPE 2 DIABETES MELLITUS: ICD-10-CM

## 2023-09-25 RX ORDER — NEBIVOLOL 10 MG/1
10 TABLET ORAL 2 TIMES DAILY
Qty: 180 TABLET | Refills: 1 | Status: SHIPPED | OUTPATIENT
Start: 2023-09-25

## 2023-09-25 NOTE — TELEPHONE ENCOUNTER
Patient was seen on 09/20- medication did not get sent to the pharmacy due to RX was set to printed- RX was not given to patient.

## 2023-09-25 NOTE — TELEPHONE ENCOUNTER
----- Message from Virgie Boyd sent at 9/23/2023 10:27 AM CDT -----  Type:  RX Refill Request    Who Called: pt  Refill or New Rx:refill  RX Name and Strength:nebivoloL (BYSTOLIC) 10 MG Tab  How is the patient currently taking it? (ex. 1XDay): Take 1 tablet (10 mg total) by mouth 2 (two) times a day.  Is this a 30 day or 90 day RX:180  Preferred Pharmacy with phone number:[id number] - [facility] [place] [facility] [address] [address]   Phone:  [phone]  Fax:  [phone]        Local or Mail Order:local  Ordering Provider:Dr Carranza  Would the patient rather a call back or a response via [facility]? call  Best Call Back Number:[phone]   Additional Information: pt has been out need to get sent today pt has been waiting

## 2023-09-25 NOTE — TELEPHONE ENCOUNTER
----- Message from Thomas Matos sent at 9/25/2023  9:37 AM CDT -----  Contact: pt  Type: Requesting refill         Who Called: PT  Regarding: nebivoloL (BYSTOLIC) 10 MG Tab 180 tablet Sig - Route: Take 1 tablet (10 mg total) by mouth 2 (two) times a day. - Oral    Would the patient rather a call back or a response via [facility]? Call back  Best Call Back Number: [phone]  pharmacy Information:  [id number] - [place] - [address] [facility], [address]   Phone: [phone]  Fax:  [phone]    Additional: Been out since Friday

## 2023-12-05 ENCOUNTER — PATIENT OUTREACH (OUTPATIENT)
Dept: ADMINISTRATIVE | Facility: HOSPITAL | Age: 68
End: 2023-12-05
Payer: MEDICARE

## 2023-12-20 DIAGNOSIS — E11.9 TYPE 2 DIABETES MELLITUS WITHOUT COMPLICATION, WITHOUT LONG-TERM CURRENT USE OF INSULIN: ICD-10-CM

## 2023-12-20 NOTE — TELEPHONE ENCOUNTER
----- Message from Thomasraza Matos sent at 12/20/2023 11:47 AM CST -----  Contact: pt  Type: Requesting refill       Who Called: PT  Regarding:  Disp Refills Start End SHABBIR  metFORMIN (GLUCOPHAGE) 1000 MG tablet 180 tablet 1 2/13/2023 - No  Sig: TAKE ONE TABLET BY MOUTH TWICE A DAY WITH MEALS      Would the patient rather a call back or a response via [facility]? Call back  Best Call Back Number:  [phone]  Additional Information: [facility]/pharmacy #[id number] - [place] - [address] [facility]  Phone: [phone]  Fax: [phone]

## 2023-12-21 RX ORDER — METFORMIN HYDROCHLORIDE 1000 MG/1
TABLET ORAL
Qty: 180 TABLET | Refills: 1 | Status: SHIPPED | OUTPATIENT
Start: 2023-12-21

## 2024-01-08 DIAGNOSIS — I15.2 HYPERTENSION ASSOCIATED WITH TYPE 2 DIABETES MELLITUS: ICD-10-CM

## 2024-01-08 DIAGNOSIS — E78.5 HYPERLIPIDEMIA ASSOCIATED WITH TYPE 2 DIABETES MELLITUS: ICD-10-CM

## 2024-01-08 DIAGNOSIS — E11.59 HYPERTENSION ASSOCIATED WITH TYPE 2 DIABETES MELLITUS: ICD-10-CM

## 2024-01-08 DIAGNOSIS — E11.69 HYPERLIPIDEMIA ASSOCIATED WITH TYPE 2 DIABETES MELLITUS: ICD-10-CM

## 2024-01-08 RX ORDER — FENOFIBRATE 160 MG/1
160 TABLET ORAL NIGHTLY
Qty: 90 TABLET | Refills: 1 | Status: SHIPPED | OUTPATIENT
Start: 2024-01-08

## 2024-01-08 RX ORDER — VALSARTAN 320 MG/1
320 TABLET ORAL DAILY
Qty: 90 TABLET | Refills: 1 | Status: SHIPPED | OUTPATIENT
Start: 2024-01-08

## 2024-01-08 RX ORDER — ROSUVASTATIN CALCIUM 40 MG/1
40 TABLET, COATED ORAL DAILY
Qty: 90 TABLET | Refills: 1 | Status: SHIPPED | OUTPATIENT
Start: 2024-01-08

## 2024-03-12 ENCOUNTER — PATIENT OUTREACH (OUTPATIENT)
Dept: ADMINISTRATIVE | Facility: HOSPITAL | Age: 69
End: 2024-03-12
Payer: MEDICARE

## 2024-03-12 DIAGNOSIS — E11.9 TYPE 2 DIABETES MELLITUS WITHOUT COMPLICATION, WITHOUT LONG-TERM CURRENT USE OF INSULIN: Primary | ICD-10-CM

## 2024-03-12 NOTE — PROGRESS NOTES
Population Health Chart Review & Patient Outreach Details      Additional [facility] Health Notes:      Micro urine scheduled         Updates Requested / Reviewed:      Updated Care Coordination Note, Care Everywhere, Care Team Updated, and Immunizations Reconciliation Completed or Queried: [facility] Topics Overdue:      [facility] Score: 2     Colon Cancer Screening  Eye Exam    RSV Vaccine                  Health Maintenance Topic(s) Outreach Outcomes & Actions Taken:    Lab(s) - Outreach Outcomes & Actions Taken  : Overdue Lab(s) Ordered and Overdue Lab(s) Scheduled

## 2024-03-27 DIAGNOSIS — G47.00 INSOMNIA, UNSPECIFIED TYPE: ICD-10-CM

## 2024-04-01 RX ORDER — CLONAZEPAM 0.5 MG/1
0.5 TABLET ORAL NIGHTLY
Qty: 30 TABLET | Refills: 0 | Status: SHIPPED | OUTPATIENT
Start: 2024-04-01 | End: 2024-04-30 | Stop reason: SDUPTHER

## 2024-04-03 ENCOUNTER — TELEPHONE (OUTPATIENT)
Dept: OPTOMETRY | Facility: CLINIC | Age: 69
End: 2024-04-03
Payer: MEDICARE

## 2024-04-03 ENCOUNTER — OFFICE VISIT (OUTPATIENT)
Dept: FAMILY MEDICINE | Facility: CLINIC | Age: 69
End: 2024-04-03
Payer: MEDICARE

## 2024-04-03 VITALS
TEMPERATURE: 98 F | OXYGEN SATURATION: 98 % | WEIGHT: 185.06 LBS | DIASTOLIC BLOOD PRESSURE: 80 MMHG | SYSTOLIC BLOOD PRESSURE: 124 MMHG | HEART RATE: 60 BPM | HEIGHT: 66 IN | BODY MASS INDEX: 29.74 KG/M2

## 2024-04-03 DIAGNOSIS — E78.5 HYPERLIPIDEMIA ASSOCIATED WITH TYPE 2 DIABETES MELLITUS: ICD-10-CM

## 2024-04-03 DIAGNOSIS — E11.59 HYPERTENSION ASSOCIATED WITH TYPE 2 DIABETES MELLITUS: ICD-10-CM

## 2024-04-03 DIAGNOSIS — Z00.00 MEDICARE ANNUAL WELLNESS VISIT, SUBSEQUENT: Primary | ICD-10-CM

## 2024-04-03 DIAGNOSIS — E11.69 HYPERLIPIDEMIA ASSOCIATED WITH TYPE 2 DIABETES MELLITUS: ICD-10-CM

## 2024-04-03 DIAGNOSIS — K63.5 MULTIPLE POLYPS OF SIGMOID COLON: ICD-10-CM

## 2024-04-03 DIAGNOSIS — F41.9 ANXIETY: ICD-10-CM

## 2024-04-03 DIAGNOSIS — E03.8 SUBCLINICAL HYPOTHYROIDISM: ICD-10-CM

## 2024-04-03 DIAGNOSIS — E11.9 TYPE 2 DIABETES MELLITUS WITHOUT COMPLICATION, WITHOUT LONG-TERM CURRENT USE OF INSULIN: ICD-10-CM

## 2024-04-03 DIAGNOSIS — I15.2 HYPERTENSION ASSOCIATED WITH TYPE 2 DIABETES MELLITUS: ICD-10-CM

## 2024-04-03 PROBLEM — R19.5 POSITIVE COLORECTAL CANCER SCREENING USING COLOGUARD TEST: Status: RESOLVED | Noted: 2021-08-19 | Resolved: 2024-04-03

## 2024-04-03 PROBLEM — R79.89 ABNORMAL TSH: Status: RESOLVED | Noted: 2022-02-08 | Resolved: 2024-04-03

## 2024-04-03 PROCEDURE — 3061F NEG MICROALBUMINURIA REV: CPT | Mod: HCNC,CPTII,S$GLB, | Performed by: NURSE PRACTITIONER

## 2024-04-03 PROCEDURE — 3074F SYST BP LT 130 MM HG: CPT | Mod: HCNC,CPTII,S$GLB, | Performed by: NURSE PRACTITIONER

## 2024-04-03 PROCEDURE — 1101F PT FALLS ASSESS-DOCD LE1/YR: CPT | Mod: HCNC,CPTII,S$GLB, | Performed by: NURSE PRACTITIONER

## 2024-04-03 PROCEDURE — 1160F RVW MEDS BY RX/DR IN RCRD: CPT | Mod: HCNC,CPTII,S$GLB, | Performed by: NURSE PRACTITIONER

## 2024-04-03 PROCEDURE — 3079F DIAST BP 80-89 MM HG: CPT | Mod: HCNC,CPTII,S$GLB, | Performed by: NURSE PRACTITIONER

## 2024-04-03 PROCEDURE — 3044F HG A1C LEVEL LT 7.0%: CPT | Mod: HCNC,CPTII,S$GLB, | Performed by: NURSE PRACTITIONER

## 2024-04-03 PROCEDURE — 4010F ACE/ARB THERAPY RXD/TAKEN: CPT | Mod: HCNC,CPTII,S$GLB, | Performed by: NURSE PRACTITIONER

## 2024-04-03 PROCEDURE — 1159F MED LIST DOCD IN RCRD: CPT | Mod: HCNC,CPTII,S$GLB, | Performed by: NURSE PRACTITIONER

## 2024-04-03 PROCEDURE — 3066F NEPHROPATHY DOC TX: CPT | Mod: HCNC,CPTII,S$GLB, | Performed by: NURSE PRACTITIONER

## 2024-04-03 PROCEDURE — 1126F AMNT PAIN NOTED NONE PRSNT: CPT | Mod: HCNC,CPTII,S$GLB, | Performed by: NURSE PRACTITIONER

## 2024-04-03 PROCEDURE — 3288F FALL RISK ASSESSMENT DOCD: CPT | Mod: HCNC,CPTII,S$GLB, | Performed by: NURSE PRACTITIONER

## 2024-04-03 PROCEDURE — G0439 PPPS, SUBSEQ VISIT: HCPCS | Mod: HCNC,S$GLB,, | Performed by: NURSE PRACTITIONER

## 2024-04-03 PROCEDURE — 99999 PR PBB SHADOW E&M-EST. PATIENT-LVL V: CPT | Mod: PBBFAC,HCNC,, | Performed by: NURSE PRACTITIONER

## 2024-04-03 NOTE — TELEPHONE ENCOUNTER
----- Message from Jaylan Wiley sent at 4/3/2024 11:55 AM CDT -----  Type:  Patient Returning Call    Who Called:Pt  Who Left Message for Patient:Giovanna  Does the patient know what this is regarding?:Can make the 1:40pm apt  Would the patient rather a call back or a response via [facility]? Call  Best Call Back Number:[phone]  Additional Information:

## 2024-04-03 NOTE — PROGRESS NOTES
"Subjective:       Patient ID: Bartolome Esquivel Jr. is a 69 y.o. male.    Chief Complaint: Medicare AWV    HPI    THIS IS A MEDICARE ANNUAL PHYSICAL EXAM SO HEALTH RISK ASSESSMENT, DEPRESSION SCREENING, AND ADL/FUNCTIONAL ABILITY CHECKLIST REVIEWED - SEE FLOW SHEETS.  ALL PAST MEDICAL, SURGICAL AND FAMILY HISTORY REVIEWED - SEE BELOW.  ALL CHRONIC PROBLEMS EVALUATED.       Medicare AWV:  1. HRA completed:  See Health Risk Assessment flowsheet  2. PMH and Family history reviewed and updated  3.  Updated list of current providers  Team Member Role and Specialty Contact Info Address Start End Comments   PCPs         Susie Carranza NP General (Family Medicine) Phone: [phone]  Fax: [phone] [address]  [address]  [place] [address] 11/4/2014 - Merged   Additional Team Members         Susie Carranza, NP (Internal Medicine) Phone: [phone]  Fax: [phone] [address]  [address]  [place] [address] 11/4/2014 - -   Sumaya Estrada MA Care Coordinator - - 5/30/2019 - -   CEE Perdomo MD Consulting Physician (Colon and Rectal Surgery) Phone: [phone]  Pager: [phone] [address]  [address]  [place] [address] 8/19/2021 - -     4.  Updated measures/vital signs completed and stable  /80 (BP Location: Left arm, Patient Position: Sitting, BP Method: Large (Manual))   Pulse 60   Temp 97.9 °F (36.6 °C) (Temporal)   Ht 5' 6" (1.676 m)   Wt 84 kg (185 lb 1.2 oz)   SpO2 98%   BMI 29.87 kg/m²   5. Cognitive function evaluated and intact. See clock test below.  6.  Health Maintenance schedule per [facility] reviewed and updated - schedule reviewed with patient - seen under POC below.  7.  Risk factor review: no depression, no cognitive impairments  8.  All preventative counseling reviewed and personalized health advice given.    9.  Advanced care planning discussed with handouts given for patient to review.  He will take forms home to complete.  10.  Patient is on NO opioids  11.  " "Patient has NO substance use.              Patient is a 69 year old white male with Type 2 Diabetes, Hypertension, Hyperlipidemia, Anxiety, Insomnia, chronic mildly elevated liver enzymes, chronic arthralgia, and personal history of colon polyps that is here today for MEDICARE AWV with fasting lab results.       Type 2 Diabetes   controlled on Metformin 1000 mg twice daily   with hemoglobin A1c 6.3%.   Advised to get EYE exam now - referred to [facility].  Declined pneumonia vaccine  Stable - stay on same medications  Recheck in 6 months.            Hypertension  Prescribed Valsartan 320 mg daily and Bystolic 10 mg TWO tablets daily (cheaper than 20 mg once daily)  STOPPED Amlodipinedue to Ankle Swelling  Unable to take HCTZ due to pain with urination  /80 (BP Location: Left arm, Patient Position: Sitting, BP Method: Large (Manual))   Pulse 60   Temp 97.9 °F (36.6 °C) (Temporal)   Ht 5' 6" (1.676 m)   Wt 84 kg (185 lb 1.2 oz)   SpO2 98%   BMI 29.87 kg/m²   Stable  Recheck in 6 months.           Hyperlipidemia associated with Type 2 Diabetes  Prescribed Rosuvastatin 40 mg daily with Fenofibrate 160 mg daily.    Advised patient to take both medications daily.   LDL 66.6         History of Mildly elevated liver enzymes   Monitoring since 2018  Within range since Feb. 2022          Anxiety and Insomnia   takes Clonazepam 0.5 mg at bedtime as needed.    Patient is well aware of safety risks as well as dependency risk associated with Benzodiazepine usage - patient FAILED on  Trazodone trial to replace Clonazepam use and refuses to try any other alternative at this time. Patient is well aware of fall risk associated with medication use.     Personal history of colon polyps.   Last colonoscopy on 8/19/2021 = DUE to repeat 1 year - plans to schedule EARLY 2024  He states colonoscopy delayed due to financial situation but he will schedule this year.     Subclinical Hypothyroidism  Mildly elevated since " Feb. 2022  Asymptomatic  Will continue to monitor - will check full thyroid panel in 6 months.      Wellness labs:   CBC within normal limits  CMP within acceptable range other than impaired fasting glucose 134 and hemoglobin A1c 6.3%   Cholesterol controlled on present medications   TSH mildly elevated at 5.13-see note above   PSA screening is normal at 0.38     Health maintenance:   Diabetic eye exam is scheduled for 08/08/2024   Patient to call me when ready to set up for coloscopy-states he will do it before next 6 month follow-up  Declined all vaccinations    Lab Visit on 03/27/2024   Component Date Value Ref Range Status    Microalbumin, Urine 03/27/2024 10.0  ug/mL Final    Creatinine, Urine 03/27/2024 78.0  23.0 - 375.0 mg/dL Final    Microalb/Creat Ratio 03/27/2024 12.8  0.0 - 30.0 ug/mg Final   Lab Visit on 03/27/2024   Component Date Value Ref Range Status    WBC 03/27/2024 8.54  3.90 - 12.70 K/uL Final    RBC 03/27/2024 4.90  4.60 - 6.20 M/uL Final    Hemoglobin 03/27/2024 15.3  14.0 - 18.0 g/dL Final    Hematocrit 03/27/2024 44.2  40.0 - 54.0 % Final    MCV 03/27/2024 90  82 - 98 fL Final    MCH 03/27/2024 31.2 (H)  27.0 - 31.0 pg Final    MCHC 03/27/2024 34.6  32.0 - 36.0 g/dL Final    RDW 03/27/2024 11.6  11.5 - 14.5 % Final    Platelets 03/27/2024 289  150 - 450 K/uL Final    MPV 03/27/2024 11.0  9.2 - 12.9 fL Final    Immature Granulocytes 03/27/2024 0.5  0.0 - 0.5 % Final    Gran # (ANC) 03/27/2024 5.0  1.8 - 7.7 K/uL Final    Immature Grans (Abs) 03/27/2024 0.04  0.00 - 0.04 K/uL Final    Comment: Mild elevation in immature granulocytes is non specific and   can be seen in a variety of conditions including stress response,   acute inflammation, trauma and pregnancy. Correlation with other   laboratory and clinical findings is essential.      Lymph # 03/27/2024 2.4  1.0 - 4.8 K/uL Final    Mono # 03/27/2024 0.8  0.3 - 1.0 K/uL Final    Eos # 03/27/2024 0.3  0.0 - 0.5 K/uL Final    Baso # 03/27/2024  0.05  0.00 - 0.20 K/uL Final    nRBC 03/27/2024 0  0 /100 WBC Final    Gran % 03/27/2024 58.4  38.0 - 73.0 % Final    Lymph % 03/27/2024 28.2  18.0 - 48.0 % Final    Mono % 03/27/2024 9.1  4.0 - 15.0 % Final    Eosinophil % 03/27/2024 3.2  0.0 - 8.0 % Final    Basophil % 03/27/2024 0.6  0.0 - 1.9 % Final    Differential Method 03/27/2024 Automated   Final    Sodium 03/27/2024 136  136 - 145 mmol/L Final    Potassium 03/27/2024 4.7  3.5 - 5.1 mmol/L Final    Chloride 03/27/2024 94 (L)  95 - 110 mmol/L Final    CO2 03/27/2024 24  23 - 29 mmol/L Final    Glucose 03/27/2024 134 (H)  70 - 110 mg/dL Final    BUN 03/27/2024 13  2 - 20 mg/dL Final    Creatinine 03/27/2024 0.80  0.50 - 1.40 mg/dL Final    Calcium 03/27/2024 9.6  8.7 - 10.5 mg/dL Final    Total Protein 03/27/2024 7.7  6.0 - 8.4 g/dL Final    Albumin 03/27/2024 4.9  3.5 - 5.2 g/dL Final    Total Bilirubin 03/27/2024 0.8  0.1 - 1.0 mg/dL Final    Comment: For infants and newborns, interpretation of results should be based  on gestational age, weight and in agreement with clinical  observations.    Premature Infant recommended reference ranges:  Up to 24 hours.............<8.0 mg/dL  Up to 48 hours............<12.0 mg/dL  3-5 days..................<15.0 mg/dL  6-29 days.................<15.0 mg/dL      Alkaline Phosphatase 03/27/2024 55  38 - 126 U/L Final    AST 03/27/2024 24  15 - 46 U/L Final    ALT 03/27/2024 22  10 - 44 U/L Final    Anion Gap 03/27/2024 18 (H)  8 - 16 mmol/L Final    eGFR 03/27/2024 >60.0  >60 mL/min/1.73 m^2 Final    Hemoglobin A1C 03/27/2024 6.3 (H)  4.0 - 5.6 % Final    Comment: ADA Screening Guidelines:  5.7-6.4%  Consistent with prediabetes  >or=6.5%  Consistent with diabetes    High levels of fetal hemoglobin interfere with the HbA1C  assay. Heterozygous hemoglobin variants (HbS, HgC, etc)do  not significantly interfere with this assay.   However, presence of multiple variants may affect accuracy.      Estimated Avg Glucose 03/27/2024  134 (H)  68 - 131 mg/dL Final    Cholesterol 03/27/2024 131  120 - 199 mg/dL Final    Comment: The National Cholesterol Education Program (NCEP) has set the  following guidelines (reference ranges) for Cholesterol:  Optimal.....................<200 mg/dL  Borderline High.............200-239 mg/dL  High........................> or = 240 mg/dL      Triglycerides 03/27/2024 112  30 - 150 mg/dL Final    Comment: The National Cholesterol Education Program (NCEP) has set the  following guidelines (reference values) for triglycerides:  Normal......................<150 mg/dL  Borderline High.............150-199 mg/dL  High........................200-499 mg/dL      HDL 03/27/2024 42  40 - 75 mg/dL Final    Comment: The National Cholesterol Education Program (NCEP) has set the  following guidelines (reference values) for HDL Cholesterol:  Low...............<40 mg/dL  Optimal...........>60 mg/dL      LDL Cholesterol 03/27/2024 66.6  63.0 - 159.0 mg/dL Final    Comment: The National Cholesterol Education Program (NCEP) has set the  following guidelines (reference values) for LDL Cholesterol:  Optimal.......................<130 mg/dL  Borderline High...............130-159 mg/dL  High..........................160-189 mg/dL  Very High.....................>190 mg/dL      HDL/Cholesterol Ratio 03/27/2024 32.1  20.0 - 50.0 % Final    Total Cholesterol/HDL Ratio 03/27/2024 3.1  2.0 - 5.0 Final    Non-HDL Cholesterol 03/27/2024 89  mg/dL Final    Comment: Risk category and Non-HDL cholesterol goals:  Coronary heart disease (CHD)or equivalent (10-year risk of CHD >20%):  Non-HDL cholesterol goal     <130 mg/dL  Two or more CHD risk factors and 10-year risk of CHD <= 20%:  Non-HDL cholesterol goal     <160 mg/dL  0 to 1 CHD risk factor:  Non-HDL cholesterol goal     <190 mg/dL      TSH 03/27/2024 5.130 (H)  0.400 - 4.000 uIU/mL Final    Comment: Warning:  Heterophilic antibodies in serum or plasma of   certain individuals are known to  "cause interference with   immunoassays. These antibodies may be present in blood samples   from individuals regularly exposed to animal or who have been   treated with animal products.     Patients taking high doses of supplemental biotin may have  negatively biased results.       PSA, Screen 03/27/2024 0.38  0.00 - 4.00 ng/mL Final    Comment: The testing method is a chemiluminescent microparticle immunoassay   manufactured by [employer] and performed on the    or   [employer] system. Values obtained with different assay manufacturers   for   methods may be different and cannot be used interchangeably.  PSA Expected levels:  Hormonal Therapy: <0.05 ng/ml  Prostatectomy: <0.01 ng/ml  Radiation Therapy: <1.00 ng/ml      Free T4 03/27/2024 0.99  0.71 - 1.51 ng/dL Final           Review of Systems   HENT: Negative.     Respiratory: Negative.     Cardiovascular: Negative.    Gastrointestinal: Negative.          Objective:     Vitals:    04/03/24 1008   BP: 124/80   BP Location: Left arm   Patient Position: Sitting   BP Method: Large (Manual)   Pulse: 60   Temp: 97.9 °F (36.6 °C)   TempSrc: Temporal   SpO2: 98%   Weight: 84 kg (185 lb 1.2 oz)   Height: 5' 6" (1.676 m)          Physical Exam  Constitutional:       Appearance: Normal appearance.      Comments: Body mass index is 29.87 kg/m².       HENT:      Right Ear: Tympanic membrane, ear canal and external ear normal. There is no impacted cerumen.      Left Ear: Tympanic membrane, ear canal and external ear normal. There is no impacted cerumen.      Nose: No congestion.      Mouth/Throat:      Mouth: Mucous membranes are moist.      Pharynx: No posterior oropharyngeal erythema.   Eyes:      General:         Right eye: No discharge.         Left eye: No discharge.   Cardiovascular:      Rate and Rhythm: Normal rate and regular rhythm.      Pulses: Normal pulses.      Heart sounds: Normal heart sounds.   Pulmonary:      Effort: No respiratory distress. "      Breath sounds: Normal breath sounds. No stridor.   Abdominal:      General: Bowel sounds are normal. There is no distension.      Palpations: Abdomen is soft.   Musculoskeletal:         General: No swelling or tenderness.      Cervical back: Normal range of motion. No tenderness.   Skin:     General: Skin is warm and dry.      Capillary Refill: Capillary refill takes less than 2 seconds.   Neurological:      General: No focal deficit present.      Mental Status: He is alert and oriented to person, place, and time.   Psychiatric:         Mood and Affect: Mood normal.         Behavior: Behavior normal.           Assessment:         ICD-10-CM ICD-9-CM   1. Medicare annual wellness visit, subsequent  Z00.00 V70.0   2. Type 2 diabetes mellitus without complication, without long-term current use of insulin  E11.9 250.00   3. Hypertension associated with type 2 diabetes mellitus  E11.59 250.80    I15.2 401.9   4. Hyperlipidemia associated with type 2 diabetes mellitus  E11.69 250.80    E78.5 272.4   5. Subclinical hypothyroidism  E03.8 244.8   6. Anxiety  F41.9 300.00   7. Multiple polyps of sigmoid colon  K63.5 211.3       Plan:       1. Medicare annual wellness visit, subsequent   Health Maintenance Summary   Full History      Expand All  Collapse All  Scheduled - Eye Exam   (Yearly)Scheduled for 8/8/2024 07/12/2021   DIABETES EYE EXAM   03/18/2019  Diabetic Eye Screening Photo   12/20/2016  Done - Dr. Humphries - no diabetic retinopathy   Overdue - Colorectal Cancer Screening   (Colonoscopy - Yearly)Overdue since 8/19/2022 08/19/2021  Colonoscopy   08/19/2021  Surgical Procedure: COLONOSCOPY   07/21/2021  Cologuard Result component of Cologuard Screening (Multitarget Stool DNA)   05/13/2020  Fecal Immunochemical Test (iFOBT) component of Fecal Immunochemical Test (iFOBT)   03/16/2019  Fecal Immunochemical Test (iFOBT) component of Fecal Immunochemical Test (iFOBT)   View More History   Postponed - RSV Vaccine  (Age 60+ and Pregnant patients)   (1 - 1-dose 60+ series)Postponed until 4/3/2024  No completion history exists for this topic.   Postponed - Influenza Vaccine   (1)Postponed until 6/30/2024 02/26/2018  Declined   01/13/2017  Declined   11/06/2015  Declined   Postponed - Shingles Vaccine   (1 of 2)Postponed until 9/20/2024  No completion history exists for this topic.   Postponed - COVID-19 Vaccine   (4 - 2023-24 season)Postponed until 4/3/2025  12/26/2021  Imm Admin: COVID-19, MRNA, LN-S, PF (Pfizer) (Purple Cap)   04/14/2021  Imm Admin: COVID-19, MRNA, LN-S, PF (MODERNA FULL 0.5 ML DOSE)   03/17/2021  Imm Admin: COVID-19, MRNA, LN-S, PF (MODERNA FULL 0.5 ML DOSE)   Postponed - Pneumococcal Vaccines (Age 65+)   (1 of 2 - PCV)Postponed until 4/3/2025  No completion history exists for this topic.   Foot Exam   (Yearly)Next due on 9/20/2024 09/20/2023  Done   09/20/2023  SmartData: WORKFLOW - DIABETES - DIABETIC FOOT EXAM PERFORMED   08/16/2022  Done   07/14/2021  Done   07/14/2021  SmartData: WORKFLOW - DIABETES - DIABETIC FOOT EXAM PERFORMED   View More History   Scheduled - Hemoglobin A1c   (Every 6 Months)Scheduled for 9/25/2024 03/27/2024  Hemoglobin A1C External component of Hemoglobin A1C   09/13/2023  Hemoglobin A1C External component of Hemoglobin A1C   03/15/2023  Hemoglobin A1C External component of Hemoglobin A1C   11/10/2022  Hemoglobin A1C External component of Hemoglobin A1C   08/10/2022  Hemoglobin A1C External component of Hemoglobin A1C   View More History   PROSTATE-SPECIFIC ANTIGEN   (Yearly)Next due on 3/27/2025  03/27/2024  PSA, Screening   03/15/2023  PSA, Screening   02/03/2022  PSA, Screening   01/05/2021  PSA, Screening   09/11/2019  PSA, Screening   View More History   Diabetes Urine Screening   (Yearly)Next due on 3/27/2025  03/27/2024  MICROALB/CREAT RATIO component of Microalbumin/Creatinine Ratio, Urine   11/10/2022  MICROALB/CREAT RATIO component of Microalbumin/Creatinine Ratio,  "Urine   07/09/2021  MICROALB/CREAT RATIO component of Microalbumin/Creatinine Ratio, Urine   11/04/2015  MICROALB/CREAT RATIO component of Microalbumin/creatinine urine ratio   Lipid Panel   (Yearly)Next due on 3/27/2025  03/27/2024  Cholesterol Total component of Lipid Panel   09/13/2023  Cholesterol Total component of Lipid Panel   03/15/2023  Cholesterol Total component of Lipid Panel   11/10/2022  Cholesterol Total component of Lipid Panel   08/10/2022  Cholesterol Total component of Lipid Panel   View More History   Low Dose Statin   (Yearly)Next due on 4/3/2025  04/03/2024  Registry Metric: Last Current Statin Reviewed Date   01/08/2024  Registry Metric: Last Current Statin Order Date   TETANUS VACCINE   (Every 10 Years)Next due on 6/23/2026 06/23/2016  Declined   Hepatitis C Screening  Completed  06/22/2016  Hepatitis C Ab component of Hepatitis C antibody   Abdominal Aortic Aneurysm Screening  Completed  03/23/2022  US Abdominal Aorta       2. Type 2 diabetes mellitus without complication, without long-term current use of insulin  Overview:  controlled on Metformin 1000 mg twice daily   with hemoglobin A1c 6.3%.   Advised to get EYE exam now - referred to [facility].  Declined pneumonia vaccine  Stable - stay on same medications  Recheck in 6 months.      Orders:  -     Ambulatory referral/consult to Ophthalmology; Future; Expected date: 04/10/2024  -     Hemoglobin A1C; Future; Expected date: 04/03/2024  -     Comprehensive Metabolic Panel; Future; Expected date: 04/03/2024    3. Hypertension associated with type 2 diabetes mellitus  Overview:  Prescribed Valsartan 320 mg daily and Bystolic 10 mg TWO tablets daily (cheaper than 20 mg once daily)  STOPPED Amlodipinedue to Ankle Swelling  Unable to take HCTZ due to pain with urination  /80 (BP Location: Left arm, Patient Position: Sitting, BP Method: Large (Manual))   Pulse 60   Temp 97.9 °F (36.6 °C) (Temporal)   Ht 5' 6" (1.676 m)   Wt " 84 kg (185 lb 1.2 oz)   SpO2 98%   BMI 29.87 kg/m²   Stable  Recheck in 6 months.      4. Hyperlipidemia associated with type 2 diabetes mellitus  Overview:  Prescribed Rosuvastatin 40 mg daily with Fenofibrate 160 mg daily.    Advised patient to take both medications daily.   LDL 66.6 - monitored yearly        5. Subclinical hypothyroidism  Overview:  Mildly elevated since Feb. 2022  Asymptomatic  Will continue to monitor - will check full thyroid panel in 6 months.      Orders:  -     TSH; Future; Expected date: 04/03/2024  -     T4, Free; Future; Expected date: 04/03/2024  -     T4; Future; Expected date: 04/03/2024  -     T3; Future; Expected date: 04/03/2024  -     Thyroid Peroxidase Antibody; Future; Expected date: 04/03/2024    6. Anxiety  Overview:  takes Clonazepam 0.5 mg at bedtime as needed.    Patient is well aware of safety risks as well as dependency risk associated with Benzodiazepine usage - patient FAILED on  Trazodone trial to replace Clonazepam use and refuses to try any other alternative at this time. Patient is well aware of fall risk associated with medication use.      7. Multiple polyps of sigmoid colon  Overview:  Last colonoscopy on 8/19/2021 = DUE to repeat 1 year - plans to schedule EARLY 2024  He states colonoscopy delayed due to financial situation but he will schedule this year.         Follow up in about 6 months (around 10/3/2024) for fasting labs and follow up.     Patient's Medications   New Prescriptions    No medications on file   Previous Medications    ASPIRIN (ECOTRIN) 81 MG EC TABLET    Take 1 tablet (81 mg total) by mouth once daily.    CICLOPIROX (PENLAC) 8 % SOLN    Apply topically nightly. Clean nail with alcohol every 7 days.    CLONAZEPAM (KLONOPIN) 0.5 MG TABLET    TAKE ONE TABLET BY MOUTH EVERY EVENING    CYANOCOBALAMIN (VITAMIN B-12) 100 MCG TABLET    Take 100 mcg by mouth once daily.    FENOFIBRATE 160 MG TAB    Take 1 tablet (160 mg total) by mouth every evening.     GARLIC 400 MG TAB    Take by mouth.    METFORMIN (GLUCOPHAGE) 1000 MG TABLET    TAKE ONE TABLET BY MOUTH TWICE A DAY WITH A MEAL    MOMETASONE (NASONEX) 50 MCG/ACTUATION NASAL SPRAY    2 sprays by Nasal route once daily.    NEBIVOLOL (BYSTOLIC) 10 MG TAB    Take 1 tablet (10 mg total) by mouth 2 (two) times a day.    PSYLLIUM 0.52 GRAM CAPSULE    Take 3 capsules by mouth 2 (two) times daily.    ROSUVASTATIN (CRESTOR) 40 MG TAB    Take 1 tablet (40 mg total) by mouth once daily.    VALSARTAN (DIOVAN) 320 MG TABLET    Take 1 tablet (320 mg total) by mouth once daily.    VITAMIN D 1000 UNITS TAB    Take 4,000 Units by mouth once daily.   Modified Medications    No medications on file   Discontinued Medications    No medications on file       Past Medical History:   Diagnosis Date    Anxiety     DM (diabetes mellitus) 2014    Hyperlipidemia     Hypertension     Impaired fasting glucose     Metabolic syndrome        Past Surgical History:   Procedure Laterality Date    COLONOSCOPY      + polyp - tubular adenoma - Dr. Hernandez    COLONOSCOPY N/A 2021    Procedure: COLONOSCOPY;  Surgeon: CEE Perdomo MD;  Location: [facility];  Service: Endoscopy;  Laterality: N/A;       Family History   Problem Relation Age of Onset    Cancer Mother         Lung Cancer and Colon Cancer    Heart disease Father 49        MI    Diabetes Father     No Known Problems Daughter     No Known Problems Son     No Known Problems Son        Social History     Socioeconomic History    Marital status:    Occupational History    Occupation: retired     Employer: [employer]   Tobacco Use    Smoking status: Former     Current packs/day: 0.00     Average packs/day: 1 pack/day for 15.0 years (15.0 ttl pk-yrs)     Types: Cigarettes     Start date: 1979     Quit date: 1994     Years since quittin.2     Passive exposure: Past    Smokeless tobacco: Former     Types: Chew    Tobacco comments:     Quit in    Substance  and Sexual Activity    Alcohol use: Yes     Comment: Reports 1 to 2 beers per day on weekdays and 10 beers per day on weekend days    Drug use: No    Sexual activity: Yes     Partners: Female

## 2024-04-20 DIAGNOSIS — E11.59 HYPERTENSION ASSOCIATED WITH TYPE 2 DIABETES MELLITUS: ICD-10-CM

## 2024-04-20 DIAGNOSIS — I15.2 HYPERTENSION ASSOCIATED WITH TYPE 2 DIABETES MELLITUS: ICD-10-CM

## 2024-04-29 RX ORDER — NEBIVOLOL 10 MG/1
20 TABLET ORAL DAILY
Qty: 180 TABLET | Refills: 3 | Status: SHIPPED | OUTPATIENT
Start: 2024-04-29

## 2024-04-30 DIAGNOSIS — G47.00 INSOMNIA, UNSPECIFIED TYPE: ICD-10-CM

## 2024-04-30 RX ORDER — CLONAZEPAM 0.5 MG/1
0.5 TABLET ORAL NIGHTLY
Qty: 30 TABLET | Refills: 5 | Status: SHIPPED | OUTPATIENT
Start: 2024-04-30

## 2024-04-30 NOTE — TELEPHONE ENCOUNTER
----- Message from Mickie Ferrer sent at 4/30/2024  8:12 AM CDT -----  Type:  RX Refill Request    Who Called:  Bartolome Esquivel  Refill or New Rx: Refill  RX Name and Strength:   clonazePAM (KLONOPIN) 0.5 MG tablet [[id number]   How is the patient currently taking it? (ex. 1XDay): TAKE ONE TABLET BY MOUTH EVERY EVENING - Oral  Is this a 30 day or 90 day RX: 30  Preferred Pharmacy with phone number: [facility]/pharmacy #[id number] - [place], [place] - [address] [address]   Phone: [phone]  Fax: [phone]  Local or Mail Order: local  Ordering Provider: [place]  Would the patient rather a call back or a response via [facility]?  call  Best Call Back Number:  [phone]  Additional Information:

## 2024-05-09 ENCOUNTER — OFFICE VISIT (OUTPATIENT)
Dept: OPTOMETRY | Facility: CLINIC | Age: 69
End: 2024-05-09
Payer: MEDICARE

## 2024-05-09 DIAGNOSIS — H52.203 HYPEROPIA WITH ASTIGMATISM AND PRESBYOPIA, BILATERAL: ICD-10-CM

## 2024-05-09 DIAGNOSIS — E11.9 TYPE 2 DIABETES MELLITUS WITHOUT COMPLICATION, WITHOUT LONG-TERM CURRENT USE OF INSULIN: ICD-10-CM

## 2024-05-09 DIAGNOSIS — H52.4 HYPEROPIA WITH ASTIGMATISM AND PRESBYOPIA, BILATERAL: ICD-10-CM

## 2024-05-09 DIAGNOSIS — H52.03 HYPEROPIA WITH ASTIGMATISM AND PRESBYOPIA, BILATERAL: ICD-10-CM

## 2024-05-09 DIAGNOSIS — E11.9 TYPE 2 DIABETES MELLITUS WITHOUT RETINOPATHY: Primary | ICD-10-CM

## 2024-05-09 DIAGNOSIS — H25.13 NUCLEAR SCLEROSIS OF BOTH EYES: ICD-10-CM

## 2024-05-09 DIAGNOSIS — H04.123 DRY EYE SYNDROME OF BOTH EYES: ICD-10-CM

## 2024-05-09 PROCEDURE — 3066F NEPHROPATHY DOC TX: CPT | Mod: HCNC,CPTII,S$GLB, | Performed by: OPTOMETRIST

## 2024-05-09 PROCEDURE — 1101F PT FALLS ASSESS-DOCD LE1/YR: CPT | Mod: HCNC,CPTII,S$GLB, | Performed by: OPTOMETRIST

## 2024-05-09 PROCEDURE — 3288F FALL RISK ASSESSMENT DOCD: CPT | Mod: HCNC,CPTII,S$GLB, | Performed by: OPTOMETRIST

## 2024-05-09 PROCEDURE — 4010F ACE/ARB THERAPY RXD/TAKEN: CPT | Mod: HCNC,CPTII,S$GLB, | Performed by: OPTOMETRIST

## 2024-05-09 PROCEDURE — 3044F HG A1C LEVEL LT 7.0%: CPT | Mod: HCNC,CPTII,S$GLB, | Performed by: OPTOMETRIST

## 2024-05-09 PROCEDURE — 1159F MED LIST DOCD IN RCRD: CPT | Mod: HCNC,CPTII,S$GLB, | Performed by: OPTOMETRIST

## 2024-05-09 PROCEDURE — 92015 DETERMINE REFRACTIVE STATE: CPT | Mod: HCNC,S$GLB,, | Performed by: OPTOMETRIST

## 2024-05-09 PROCEDURE — 92004 COMPRE OPH EXAM NEW PT 1/>: CPT | Mod: HCNC,S$GLB,, | Performed by: OPTOMETRIST

## 2024-05-09 PROCEDURE — 2023F DILAT RTA XM W/O RTNOPTHY: CPT | Mod: HCNC,CPTII,S$GLB, | Performed by: OPTOMETRIST

## 2024-05-09 PROCEDURE — 99999 PR PBB SHADOW E&M-EST. PATIENT-LVL III: CPT | Mod: PBBFAC,HCNC,, | Performed by: OPTOMETRIST

## 2024-05-09 PROCEDURE — 1126F AMNT PAIN NOTED NONE PRSNT: CPT | Mod: HCNC,CPTII,S$GLB, | Performed by: OPTOMETRIST

## 2024-05-09 PROCEDURE — 3061F NEG MICROALBUMINURIA REV: CPT | Mod: HCNC,CPTII,S$GLB, | Performed by: OPTOMETRIST

## 2024-05-09 NOTE — PROGRESS NOTES
HPI    CC: Pt presents today to establish diabetic eye care. Pt presents blurry   vision, mainly after working on the computer for a long time. Pt reports   occasional burning and tearing OU. Pt states he mainly wear OTC readers   +1.50 around the house for clearer vision.    SAMMY: a few years ago    (+) Changes in vision   (-) Pain  (+) Irritation, occasional burning and tearing  (-) Itching   (-) Flashes  (-) Floaters  (+) Glasses wearer  (-) CL wearer  (+) Uses eye gtts, Pataday gtts prn    Does patient want a refraction today? yes    (-) Eye injury  (-) Eye surgery   (-)POHx  (-)FOHx    (+)DM  Hemoglobin A1C       Date                     Value               Ref Range             Status                03/27/2024               6.3 (H)             4.0 - 5.6 %           Final                  09/13/2023               6.1 (H)             4.0 - 5.6 %           Final                  03/15/2023               6.2 (H)             4.0 - 5.6 %           Final                   Last edited by Shelia Hernandez MA on 5/9/2024  8:45 AM.            Assessment /Plan     For exam results, see Encounter Report.    Type 2 diabetes mellitus without retinopathy    Type 2 diabetes mellitus without complication, without long-term current use of insulin  -     Ambulatory referral/consult to Ophthalmology    Nuclear sclerosis of both eyes    Dry eye syndrome of both eyes    Hyperopia with astigmatism and presbyopia, bilateral      1-2. No retinopathy noted, OU. Continue proper BS control and annual diabetic eye exams. Monitor yearly.      3. Educated pt on findings. Not visually significant. No need for removal at this time. Monitor yearly.      4. Educated pt on findings. Recommended ATs TID-QID + gurpreet/gel QHS for added lubrication and comfort. Discussed dry eyes are cause for ocular irritation. Pataday prn for allergy symptoms. If symptoms worsen or dont improve, RTC. Monitor.      5. Updated SRx. Mild change from habitual. Monitor  yearly.        RTC in 1 year for annual eye exam or sooner if needed.

## 2024-06-11 DIAGNOSIS — E11.9 TYPE 2 DIABETES MELLITUS WITHOUT COMPLICATION, WITHOUT LONG-TERM CURRENT USE OF INSULIN: ICD-10-CM

## 2024-06-11 RX ORDER — METFORMIN HYDROCHLORIDE 1000 MG/1
TABLET ORAL
Qty: 180 TABLET | Refills: 3 | Status: SHIPPED | OUTPATIENT
Start: 2024-06-11

## 2024-08-01 ENCOUNTER — TELEPHONE (OUTPATIENT)
Dept: FAMILY MEDICINE | Facility: CLINIC | Age: 69
End: 2024-08-01
Payer: MEDICARE

## 2024-08-01 DIAGNOSIS — I15.2 HYPERTENSION ASSOCIATED WITH TYPE 2 DIABETES MELLITUS: ICD-10-CM

## 2024-08-01 DIAGNOSIS — E11.59 HYPERTENSION ASSOCIATED WITH TYPE 2 DIABETES MELLITUS: ICD-10-CM

## 2024-08-01 RX ORDER — NEBIVOLOL 20 MG/1
20 TABLET ORAL DAILY
Qty: 90 TABLET | Refills: 1 | Status: SHIPPED | OUTPATIENT
Start: 2024-08-01

## 2024-08-01 NOTE — TELEPHONE ENCOUNTER
----- Message from Virgie Boyd sent at 8/1/2024  9:05 AM CDT -----  Type:  Needs Medical Advice    Who Called: pt   Symptoms (please be specific): pt states he was given the 10 mg tablets because they were cheaper butg is changing pharmacies and would like start taking the 20mg of  nebivoloL (BYSTOLIC) 10 MG Tab    Pharmacy name and phone #:  [facility] in [place]   Would the patient rather a call back or a response via [facility]? call  Best Call Back Number: [phone]   Additional Information:

## 2024-08-01 NOTE — TELEPHONE ENCOUNTER
Patient said he will like to be switch to Nebivolol 20mg, said he currently take Nebivolol 10mg could not afford the 20mg, patient said he switch pharmacy to [facility] and the prive is cheaper. Rhiannon advised.

## 2024-08-24 DIAGNOSIS — E11.59 HYPERTENSION ASSOCIATED WITH TYPE 2 DIABETES MELLITUS: ICD-10-CM

## 2024-08-24 DIAGNOSIS — E78.5 HYPERLIPIDEMIA ASSOCIATED WITH TYPE 2 DIABETES MELLITUS: ICD-10-CM

## 2024-08-24 DIAGNOSIS — E11.69 HYPERLIPIDEMIA ASSOCIATED WITH TYPE 2 DIABETES MELLITUS: ICD-10-CM

## 2024-08-24 DIAGNOSIS — I15.2 HYPERTENSION ASSOCIATED WITH TYPE 2 DIABETES MELLITUS: ICD-10-CM

## 2024-08-26 RX ORDER — VALSARTAN 320 MG/1
320 TABLET ORAL
Qty: 90 TABLET | Refills: 3 | Status: SHIPPED | OUTPATIENT
Start: 2024-08-26

## 2024-08-26 RX ORDER — ROSUVASTATIN CALCIUM 40 MG/1
40 TABLET, COATED ORAL
Qty: 90 TABLET | Refills: 3 | Status: SHIPPED | OUTPATIENT
Start: 2024-08-26

## 2024-10-02 ENCOUNTER — OFFICE VISIT (OUTPATIENT)
Dept: FAMILY MEDICINE | Facility: CLINIC | Age: 69
End: 2024-10-02
Payer: MEDICARE

## 2024-10-02 VITALS
WEIGHT: 184.88 LBS | OXYGEN SATURATION: 98 % | BODY MASS INDEX: 29.71 KG/M2 | HEIGHT: 66 IN | SYSTOLIC BLOOD PRESSURE: 134 MMHG | HEART RATE: 66 BPM | TEMPERATURE: 98 F | DIASTOLIC BLOOD PRESSURE: 82 MMHG

## 2024-10-02 DIAGNOSIS — K63.5 MULTIPLE POLYPS OF SIGMOID COLON: ICD-10-CM

## 2024-10-02 DIAGNOSIS — E11.59 HYPERTENSION ASSOCIATED WITH TYPE 2 DIABETES MELLITUS: ICD-10-CM

## 2024-10-02 DIAGNOSIS — E11.69 HYPERLIPIDEMIA ASSOCIATED WITH TYPE 2 DIABETES MELLITUS: ICD-10-CM

## 2024-10-02 DIAGNOSIS — E11.9 TYPE 2 DIABETES MELLITUS WITHOUT COMPLICATION, WITHOUT LONG-TERM CURRENT USE OF INSULIN: Primary | ICD-10-CM

## 2024-10-02 DIAGNOSIS — Z12.5 SCREENING PSA (PROSTATE SPECIFIC ANTIGEN): ICD-10-CM

## 2024-10-02 DIAGNOSIS — E03.8 SUBCLINICAL HYPOTHYROIDISM: ICD-10-CM

## 2024-10-02 DIAGNOSIS — F41.9 ANXIETY: ICD-10-CM

## 2024-10-02 DIAGNOSIS — N52.9 ERECTILE DYSFUNCTION, UNSPECIFIED ERECTILE DYSFUNCTION TYPE: ICD-10-CM

## 2024-10-02 DIAGNOSIS — I15.2 HYPERTENSION ASSOCIATED WITH TYPE 2 DIABETES MELLITUS: ICD-10-CM

## 2024-10-02 DIAGNOSIS — E78.5 HYPERLIPIDEMIA ASSOCIATED WITH TYPE 2 DIABETES MELLITUS: ICD-10-CM

## 2024-10-02 DIAGNOSIS — E66.3 OVERWEIGHT WITH BODY MASS INDEX (BMI) OF 29 TO 29.9 IN ADULT: ICD-10-CM

## 2024-10-02 DIAGNOSIS — G47.00 INSOMNIA, UNSPECIFIED TYPE: ICD-10-CM

## 2024-10-02 PROCEDURE — 3061F NEG MICROALBUMINURIA REV: CPT | Mod: HCNC,CPTII,S$GLB, | Performed by: NURSE PRACTITIONER

## 2024-10-02 PROCEDURE — 3044F HG A1C LEVEL LT 7.0%: CPT | Mod: HCNC,CPTII,S$GLB, | Performed by: NURSE PRACTITIONER

## 2024-10-02 PROCEDURE — 4010F ACE/ARB THERAPY RXD/TAKEN: CPT | Mod: HCNC,CPTII,S$GLB, | Performed by: NURSE PRACTITIONER

## 2024-10-02 PROCEDURE — 1160F RVW MEDS BY RX/DR IN RCRD: CPT | Mod: HCNC,CPTII,S$GLB, | Performed by: NURSE PRACTITIONER

## 2024-10-02 PROCEDURE — 3008F BODY MASS INDEX DOCD: CPT | Mod: HCNC,CPTII,S$GLB, | Performed by: NURSE PRACTITIONER

## 2024-10-02 PROCEDURE — 3079F DIAST BP 80-89 MM HG: CPT | Mod: HCNC,CPTII,S$GLB, | Performed by: NURSE PRACTITIONER

## 2024-10-02 PROCEDURE — 3288F FALL RISK ASSESSMENT DOCD: CPT | Mod: HCNC,CPTII,S$GLB, | Performed by: NURSE PRACTITIONER

## 2024-10-02 PROCEDURE — 99214 OFFICE O/P EST MOD 30 MIN: CPT | Mod: HCNC,S$GLB,, | Performed by: NURSE PRACTITIONER

## 2024-10-02 PROCEDURE — 1126F AMNT PAIN NOTED NONE PRSNT: CPT | Mod: HCNC,CPTII,S$GLB, | Performed by: NURSE PRACTITIONER

## 2024-10-02 PROCEDURE — 99999 PR PBB SHADOW E&M-EST. PATIENT-LVL III: CPT | Mod: PBBFAC,HCNC,, | Performed by: NURSE PRACTITIONER

## 2024-10-02 PROCEDURE — 3075F SYST BP GE 130 - 139MM HG: CPT | Mod: HCNC,CPTII,S$GLB, | Performed by: NURSE PRACTITIONER

## 2024-10-02 PROCEDURE — 1159F MED LIST DOCD IN RCRD: CPT | Mod: HCNC,CPTII,S$GLB, | Performed by: NURSE PRACTITIONER

## 2024-10-02 PROCEDURE — 3066F NEPHROPATHY DOC TX: CPT | Mod: HCNC,CPTII,S$GLB, | Performed by: NURSE PRACTITIONER

## 2024-10-02 PROCEDURE — 1101F PT FALLS ASSESS-DOCD LE1/YR: CPT | Mod: HCNC,CPTII,S$GLB, | Performed by: NURSE PRACTITIONER

## 2024-10-02 RX ORDER — NEBIVOLOL 20 MG/1
20 TABLET ORAL DAILY
Qty: 90 TABLET | Refills: 1 | Status: SHIPPED | OUTPATIENT
Start: 2024-10-02

## 2024-10-02 RX ORDER — FENOFIBRATE 160 MG/1
160 TABLET ORAL NIGHTLY
Qty: 90 TABLET | Refills: 1 | Status: SHIPPED | OUTPATIENT
Start: 2024-10-02

## 2024-10-02 RX ORDER — METFORMIN HYDROCHLORIDE 1000 MG/1
TABLET ORAL
Qty: 180 TABLET | Refills: 1 | Status: SHIPPED | OUTPATIENT
Start: 2024-10-02

## 2024-10-02 RX ORDER — VALSARTAN 320 MG/1
320 TABLET ORAL DAILY
Qty: 90 TABLET | Refills: 1 | Status: SHIPPED | OUTPATIENT
Start: 2024-10-02

## 2024-10-02 RX ORDER — CLONAZEPAM 0.5 MG/1
0.5 TABLET ORAL NIGHTLY
Qty: 30 TABLET | Refills: 5 | Status: SHIPPED | OUTPATIENT
Start: 2024-10-02

## 2024-10-02 RX ORDER — SILDENAFIL 50 MG/1
50 TABLET, FILM COATED ORAL DAILY PRN
Qty: 10 TABLET | Refills: 3 | Status: SHIPPED | OUTPATIENT
Start: 2024-10-02 | End: 2025-10-02

## 2024-10-02 RX ORDER — ROSUVASTATIN CALCIUM 40 MG/1
40 TABLET, COATED ORAL DAILY
Qty: 90 TABLET | Refills: 1 | Status: SHIPPED | OUTPATIENT
Start: 2024-10-02

## 2024-10-02 RX ORDER — MOMETASONE FUROATE MONOHYDRATE 50 UG/1
2 SPRAY, METERED NASAL DAILY
Qty: 17 G | Refills: 5 | Status: SHIPPED | OUTPATIENT
Start: 2024-10-02
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
## 2025-02-24 DIAGNOSIS — Z00.00 ENCOUNTER FOR MEDICARE ANNUAL WELLNESS EXAM: ICD-10-CM

## 2025-04-02 ENCOUNTER — OFFICE VISIT (OUTPATIENT)
Dept: FAMILY MEDICINE | Facility: CLINIC | Age: 70
End: 2025-04-02
Payer: MEDICARE

## 2025-04-02 ENCOUNTER — TELEPHONE (OUTPATIENT)
Dept: FAMILY MEDICINE | Facility: CLINIC | Age: 70
End: 2025-04-02

## 2025-04-02 VITALS
HEART RATE: 61 BPM | TEMPERATURE: 99 F | SYSTOLIC BLOOD PRESSURE: 134 MMHG | WEIGHT: 185.31 LBS | SYSTOLIC BLOOD PRESSURE: 134 MMHG | HEIGHT: 66 IN | DIASTOLIC BLOOD PRESSURE: 82 MMHG | WEIGHT: 185.31 LBS | HEIGHT: 66 IN | HEART RATE: 61 BPM | BODY MASS INDEX: 29.78 KG/M2 | OXYGEN SATURATION: 96 % | TEMPERATURE: 99 F | BODY MASS INDEX: 29.78 KG/M2 | DIASTOLIC BLOOD PRESSURE: 82 MMHG | OXYGEN SATURATION: 96 %

## 2025-04-02 DIAGNOSIS — E03.8 SUBCLINICAL HYPOTHYROIDISM: ICD-10-CM

## 2025-04-02 DIAGNOSIS — K63.5 MULTIPLE POLYPS OF SIGMOID COLON: ICD-10-CM

## 2025-04-02 DIAGNOSIS — E66.3 OVERWEIGHT WITH BODY MASS INDEX (BMI) OF 29 TO 29.9 IN ADULT: ICD-10-CM

## 2025-04-02 DIAGNOSIS — N52.9 ERECTILE DYSFUNCTION, UNSPECIFIED ERECTILE DYSFUNCTION TYPE: ICD-10-CM

## 2025-04-02 DIAGNOSIS — F51.01 PRIMARY INSOMNIA: ICD-10-CM

## 2025-04-02 DIAGNOSIS — E11.59 HYPERTENSION ASSOCIATED WITH TYPE 2 DIABETES MELLITUS: ICD-10-CM

## 2025-04-02 DIAGNOSIS — I15.2 HYPERTENSION ASSOCIATED WITH TYPE 2 DIABETES MELLITUS: ICD-10-CM

## 2025-04-02 DIAGNOSIS — E78.5 HYPERLIPIDEMIA ASSOCIATED WITH TYPE 2 DIABETES MELLITUS: ICD-10-CM

## 2025-04-02 DIAGNOSIS — E11.9 TYPE 2 DIABETES MELLITUS WITHOUT COMPLICATION, WITHOUT LONG-TERM CURRENT USE OF INSULIN: ICD-10-CM

## 2025-04-02 DIAGNOSIS — F41.9 ANXIETY: ICD-10-CM

## 2025-04-02 DIAGNOSIS — E11.69 HYPERLIPIDEMIA ASSOCIATED WITH TYPE 2 DIABETES MELLITUS: Primary | ICD-10-CM

## 2025-04-02 DIAGNOSIS — E11.69 HYPERLIPIDEMIA ASSOCIATED WITH TYPE 2 DIABETES MELLITUS: ICD-10-CM

## 2025-04-02 DIAGNOSIS — G47.00 INSOMNIA, UNSPECIFIED TYPE: ICD-10-CM

## 2025-04-02 DIAGNOSIS — Z00.00 ENCOUNTER FOR MEDICARE ANNUAL WELLNESS EXAM: Primary | ICD-10-CM

## 2025-04-02 DIAGNOSIS — E78.5 HYPERLIPIDEMIA ASSOCIATED WITH TYPE 2 DIABETES MELLITUS: Primary | ICD-10-CM

## 2025-04-02 PROCEDURE — 99999 PR PBB SHADOW E&M-EST. PATIENT-LVL V: CPT | Mod: PBBFAC,HCNC,, | Performed by: NURSE PRACTITIONER

## 2025-04-02 PROCEDURE — 99999 PR PBB SHADOW E&M-EST. PATIENT-LVL IV: CPT | Mod: PBBFAC,HCNC,, | Performed by: NURSE PRACTITIONER

## 2025-04-02 RX ORDER — CLONAZEPAM 0.5 MG/1
0.5 TABLET ORAL NIGHTLY
Qty: 30 TABLET | Refills: 5 | Status: SHIPPED | OUTPATIENT
Start: 2025-04-02
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
## 2025-04-02 NOTE — TELEPHONE ENCOUNTER
----- Message from Thomas sent at 4/2/2025 12:53 PM CDT -----  Contact: pt  Type:  Patient Returning CallWho Called:ptWho Left Message for Patient:office Does the patient know what this is regarding?: not sure Would the patient rather a call back or a response via [facility]? Call Best Call Back Number:[phone] Additional Information:
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
## 2025-04-02 NOTE — ASSESSMENT & PLAN NOTE
Prescribed Rosuvastatin 40 mg daily with Fenofibrate 160 mg daily.    Controlled on present medications  LDL 66.6 - monitored yearly

## 2025-04-02 NOTE — ASSESSMENT & PLAN NOTE
Reports no problem getting erection, problems with maintaining erection  Just started in early 2024  Started Viagra 50 mg prn sexual activity in October 2024 and patient reports it is working well for him  Stable.

## 2025-04-02 NOTE — ASSESSMENT & PLAN NOTE
controlled on Metformin 1000 mg twice daily   with hemoglobin A1c 6.3%.   Diabetic eye exam up to date 5/2024 - will schedule appt for this year  Diabetic foot exam up to date  Diabetic urine screening normal.  Declined pneumonia vaccine  Stable - stay on same medications  Recheck in 6 months.
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
## 2025-04-02 NOTE — ASSESSMENT & PLAN NOTE
"Prescribed Valsartan 320 mg daily and Bystolic 10 mg TWO tablets daily (cheaper than 20 mg once daily)  STOPPED Amlodipinedue to Ankle Swelling  Unable to take HCTZ due to pain with urination  /82 (BP Location: Left arm, Patient Position: Sitting)   Pulse 61   Temp 99 °F (37.2 °C) (Temporal)   Ht 5' 6" (1.676 m)   Wt 84.1 kg (185 lb 4.8 oz)   SpO2 96%   BMI 29.91 kg/m²   Recheck in 6 months.  "

## 2025-04-02 NOTE — ASSESSMENT & PLAN NOTE
Mildly elevated since Feb. 2022  Asymptomatic  Full thyroid panel:  Normal levels with NEGATIVE antibodies 9/25/24

## 2025-04-02 NOTE — ASSESSMENT & PLAN NOTE
Anxiety and Insomnia   takes Clonazepam 0.5 mg at bedtime as needed.    Patient is well aware of safety risks as well as dependency risk associated with Benzodiazepine usage - patient FAILED on  Trazodone trial to replace Clonazepam use and refuses to try any other alternative at this time. Patient is well aware of fall risk associated with medication use.

## 2025-04-02 NOTE — PATIENT INSTRUCTIONS
Counseling and Referral of Other Preventative  (Italic type indicates deductible and co-insurance are waived)    Patient Name: Bartolome Esquivel  Today's Date: 4/2/2025    Health Maintenance       Date Due Completion Date    Diabetic Eye Exam 05/09/2025 5/9/2024    Override on 12/20/2016: Done (Dr. Humphries - no diabetic retinopathy)    RSV Vaccine (Age 60+ and Pregnant patients) (1 - Risk 60-74 years 1-dose series) 04/02/2025 (Originally 1/8/2017) ---    Pneumococcal Vaccines (Age 50+) (1 of 2 - PCV) 04/03/2025 (Originally 1/8/1976) ---    Colorectal Cancer Screening 07/02/2025 (Originally 1955) 8/19/2021    Override on 6/11/2007: Done (Dr. Hernandez - tubular adenoma)    Shingles Vaccine (1 of 2) 10/02/2025 (Originally 1/8/2007) ---    COVID-19 Vaccine (4 - 2024-25 season) 10/02/2025 (Originally 9/1/2024) 12/26/2021    Hemoglobin A1c 09/28/2025 3/28/2025    Foot Exam 10/02/2025 10/2/2024    Override on 10/2/2024: Done    Override on 9/20/2023: Done    Override on 8/16/2022: Done    Override on 7/14/2021: Done    Override on 5/27/2020: Done    Override on 2/26/2018: Done    Override on 6/23/2016: Done    Override on 11/6/2015: Done    PROSTATE-SPECIFIC ANTIGEN 03/28/2026 3/28/2025    Diabetes Urine Screening 03/28/2026 3/28/2025    Lipid Panel 03/28/2026 3/28/2025    Low Dose Statin 04/02/2026 4/2/2025    TETANUS VACCINE 06/23/2026 6/23/2016 (Declined)    Override on 6/23/2016: Declined        No orders of the defined types were placed in this encounter.      The following information is provided to all patients.  This information is to help you find resources for any of the problems found today that may be affecting your health:                  Living healthy guide: www.[facility].[place].[facility]      Understanding Diabetes: www.diabetes.org      Eating healthy: www.cdc.gov/healthyweight      CDC home safety checklist: www.cdc.gov/steadi/patient.html      Agency on Aging: www.goea.[place].[facility]      Alcoholics anonymous (AA):  www.aa.org      Physical Activity: www.ashlyn.nih.gov/xk2hhmn      Tobacco use: www.quitwithusla.org

## 2025-06-18 ENCOUNTER — TELEPHONE (OUTPATIENT)
Dept: PHARMACY | Facility: CLINIC | Age: 70
End: 2025-06-18
Payer: MEDICARE

## 2025-06-18 NOTE — TELEPHONE ENCOUNTER
[facility]/Population Health Chart Review & Patient Outreach Details For Medication Adherence Project    Reason for Outreach Encounter: 3rd Party payor non-compliance report ([employer], [employer], [employer], etc)  2.  Patient Outreach Method: Reviewed patient chart  and [employer] message  3.   Medication in question:    Hyperlipidemia Medications              fenofibrate 160 MG Tab Take 1 tablet (160 mg total) by mouth every evening.    rosuvastatin (CRESTOR) 40 MG Tab Take 1 tablet (40 mg total) by mouth once daily.                  rosuvastatin  last filled  4/30 for 90 day supply      4.  Reviewed and or Updates Made To: Patient Chart  5. Outreach Outcomes and/or actions taken: Patient filled medication and is on track to be adherent  Additional Notes:

## 2025-07-21 DIAGNOSIS — N52.9 ERECTILE DYSFUNCTION, UNSPECIFIED ERECTILE DYSFUNCTION TYPE: ICD-10-CM

## 2025-07-22 RX ORDER — SILDENAFIL 50 MG/1
50 TABLET, FILM COATED ORAL DAILY PRN
Qty: 10 TABLET | Refills: 3 | Status: SHIPPED | OUTPATIENT
Start: 2025-07-22 | End: 2026-07-22

## 2025-07-23 ENCOUNTER — OFFICE VISIT (OUTPATIENT)
Dept: OPTOMETRY | Facility: CLINIC | Age: 70
End: 2025-07-23
Payer: MEDICARE

## 2025-07-23 DIAGNOSIS — H25.13 NUCLEAR SCLEROSIS OF BOTH EYES: ICD-10-CM

## 2025-07-23 DIAGNOSIS — H52.4 HYPEROPIA WITH PRESBYOPIA OF LEFT EYE: ICD-10-CM

## 2025-07-23 DIAGNOSIS — H52.201 HYPEROPIA OF RIGHT EYE WITH ASTIGMATISM AND PRESBYOPIA: ICD-10-CM

## 2025-07-23 DIAGNOSIS — H52.02 HYPEROPIA WITH PRESBYOPIA OF LEFT EYE: ICD-10-CM

## 2025-07-23 DIAGNOSIS — Z01.00 ROUTINE EYE EXAM: Primary | ICD-10-CM

## 2025-07-23 DIAGNOSIS — H52.4 HYPEROPIA OF RIGHT EYE WITH ASTIGMATISM AND PRESBYOPIA: ICD-10-CM

## 2025-07-23 DIAGNOSIS — E11.9 TYPE 2 DIABETES MELLITUS WITHOUT RETINOPATHY: ICD-10-CM

## 2025-07-23 DIAGNOSIS — H52.01 HYPEROPIA OF RIGHT EYE WITH ASTIGMATISM AND PRESBYOPIA: ICD-10-CM

## 2025-07-23 PROCEDURE — 92015 DETERMINE REFRACTIVE STATE: CPT | Mod: HCNC,S$GLB,, | Performed by: OPTOMETRIST

## 2025-07-23 PROCEDURE — 2023F DILAT RTA XM W/O RTNOPTHY: CPT | Mod: CPTII,HCNC,S$GLB, | Performed by: OPTOMETRIST

## 2025-07-23 PROCEDURE — 1126F AMNT PAIN NOTED NONE PRSNT: CPT | Mod: CPTII,HCNC,S$GLB, | Performed by: OPTOMETRIST

## 2025-07-23 PROCEDURE — 3066F NEPHROPATHY DOC TX: CPT | Mod: CPTII,HCNC,S$GLB, | Performed by: OPTOMETRIST

## 2025-07-23 PROCEDURE — 92014 COMPRE OPH EXAM EST PT 1/>: CPT | Mod: HCNC,S$GLB,, | Performed by: OPTOMETRIST

## 2025-07-23 PROCEDURE — 4010F ACE/ARB THERAPY RXD/TAKEN: CPT | Mod: CPTII,HCNC,S$GLB, | Performed by: OPTOMETRIST

## 2025-07-23 PROCEDURE — 3288F FALL RISK ASSESSMENT DOCD: CPT | Mod: CPTII,HCNC,S$GLB, | Performed by: OPTOMETRIST

## 2025-07-23 PROCEDURE — 1159F MED LIST DOCD IN RCRD: CPT | Mod: CPTII,HCNC,S$GLB, | Performed by: OPTOMETRIST

## 2025-07-23 PROCEDURE — 3061F NEG MICROALBUMINURIA REV: CPT | Mod: CPTII,HCNC,S$GLB, | Performed by: OPTOMETRIST

## 2025-07-23 PROCEDURE — 1101F PT FALLS ASSESS-DOCD LE1/YR: CPT | Mod: CPTII,HCNC,S$GLB, | Performed by: OPTOMETRIST

## 2025-07-23 PROCEDURE — 3044F HG A1C LEVEL LT 7.0%: CPT | Mod: CPTII,HCNC,S$GLB, | Performed by: OPTOMETRIST

## 2025-07-23 PROCEDURE — 99999 PR PBB SHADOW E&M-EST. PATIENT-LVL III: CPT | Mod: PBBFAC,HCNC,, | Performed by: OPTOMETRIST

## 2025-07-23 NOTE — PROGRESS NOTES
HPI    CC: 69 yo M presents today for eye health exam. Pt reports no new vision   or ocular complaints. Pt has prescription glasses that he wears   occasionally. He's mostly wearing OTC readers throughout the day for both   distance and near.     SAMMY: 5/9/2024    (-) Changes in vision   (-) Pain  (-) Irritation   (-) Itching   (-) Flashes  (-) Floaters  (+) Glasses wearer  (-) CL wearer  (+) Uses eye gtts, [employer] gtts    Does patient want a refraction today? yes    (-) Eye injury  (-) Eye surgery   (+)POHx   Type 2 diabetes mellitus without retinopathy   Type 2 diabetes mellitus without complication, without long-term current   use of insulin   Nuclear sclerosis of both eyes   Dry eye syndrome of both eyes   Hyperopia with astigmatism and presbyopia, bilateral  (-)FOHx    (+)DM  Hemoglobin A1C       Date                     Value               Ref Range             Status                09/25/2024               6.0 (H)             4.0 - 5.6 %           Final                  03/27/2024               6.3 (H)             4.0 - 5.6 %           Final                  09/13/2023               6.1 (H)             4.0 - 5.6 %           Final                    03/28/2025               6.3 (H)             4.0 - 5.6 %           Final                   Last edited by Sona Steinberg, OD on 7/23/2025 10:00 AM.            Assessment /Plan     For exam results, see Encounter Report.    Routine eye exam    Hyperopia of right eye with astigmatism and presbyopia    Hyperopia with presbyopia of left eye    Type 2 diabetes mellitus without retinopathy    Nuclear sclerosis of both eyes      Eyemed Exam.     2-3. Updated SRx. Mild change from habitual. Monitor yearly.      4. No retinopathy noted, OU. Continue proper BS control and annual diabetic eye exams. Monitor yearly.      5. Educated pt on findings. Not visually significant. No need for removal at this time. Monitor yearly.        RTC in 1 year for annual eye exam or sooner if  needed.

## 2025-08-19 DIAGNOSIS — I15.2 HYPERTENSION ASSOCIATED WITH TYPE 2 DIABETES MELLITUS: ICD-10-CM

## 2025-08-19 DIAGNOSIS — E11.59 HYPERTENSION ASSOCIATED WITH TYPE 2 DIABETES MELLITUS: ICD-10-CM

## 2025-08-19 RX ORDER — VALSARTAN 320 MG/1
320 TABLET ORAL DAILY
Qty: 90 TABLET | Refills: 1 | Status: SHIPPED | OUTPATIENT
Start: 2025-08-19